# Patient Record
Sex: MALE | Race: WHITE | NOT HISPANIC OR LATINO | Employment: FULL TIME | URBAN - METROPOLITAN AREA
[De-identification: names, ages, dates, MRNs, and addresses within clinical notes are randomized per-mention and may not be internally consistent; named-entity substitution may affect disease eponyms.]

---

## 2020-03-13 ENCOUNTER — HOSPITAL ENCOUNTER (OUTPATIENT)
Dept: CARDIOLOGY | Facility: CLINIC | Age: 55
End: 2020-03-13
Attending: EMERGENCY MEDICINE
Payer: COMMERCIAL

## 2020-03-13 DIAGNOSIS — R07.9 CHEST PAIN: ICD-10-CM

## 2020-03-16 ENCOUNTER — HOSPITAL ENCOUNTER (OUTPATIENT)
Dept: CARDIOLOGY | Facility: CLINIC | Age: 55
End: 2020-03-16
Attending: EMERGENCY MEDICINE
Payer: COMMERCIAL

## 2020-03-16 ENCOUNTER — HOSPITAL ENCOUNTER (OUTPATIENT)
Dept: NUCLEAR MEDICINE | Facility: CLINIC | Age: 55
Setting detail: NUCLEAR MEDICINE
End: 2020-03-16
Attending: EMERGENCY MEDICINE
Payer: COMMERCIAL

## 2020-03-16 DIAGNOSIS — R07.9 CHEST PAIN: ICD-10-CM

## 2020-03-16 LAB
CV STRESS MAX HR HE: 96
RATE PRESSURE PRODUCT: NORMAL
STRESS ECHO BASELINE DIASTOLIC HE: 89
STRESS ECHO BASELINE HR: 67
STRESS ECHO BASELINE SYSTOLIC BP: 139
STRESS ECHO CALCULATED PERCENT HR: 57 %
STRESS ECHO LAST STRESS DIASTOLIC BP: 71
STRESS ECHO LAST STRESS SYSTOLIC BP: 138
STRESS ECHO TARGET HR: 167

## 2020-03-16 PROCEDURE — 93017 CV STRESS TEST TRACING ONLY: CPT

## 2020-03-16 PROCEDURE — 93018 CV STRESS TEST I&R ONLY: CPT | Performed by: INTERNAL MEDICINE

## 2020-03-16 PROCEDURE — 78452 HT MUSCLE IMAGE SPECT MULT: CPT | Mod: 26 | Performed by: INTERNAL MEDICINE

## 2020-03-16 PROCEDURE — 25000128 H RX IP 250 OP 636: Performed by: INTERNAL MEDICINE

## 2020-03-16 PROCEDURE — A9502 TC99M TETROFOSMIN: HCPCS

## 2020-03-16 PROCEDURE — 78452 HT MUSCLE IMAGE SPECT MULT: CPT

## 2020-03-16 PROCEDURE — 93016 CV STRESS TEST SUPVJ ONLY: CPT | Performed by: INTERNAL MEDICINE

## 2020-03-16 PROCEDURE — 34300033 ZZH RX 343

## 2020-03-16 RX ORDER — AMINOPHYLLINE 25 MG/ML
50-100 INJECTION, SOLUTION INTRAVENOUS
Status: DISCONTINUED | OUTPATIENT
Start: 2020-03-16 | End: 2020-03-17 | Stop reason: HOSPADM

## 2020-03-16 RX ORDER — REGADENOSON 0.08 MG/ML
0.4 INJECTION, SOLUTION INTRAVENOUS ONCE
Status: COMPLETED | OUTPATIENT
Start: 2020-03-16 | End: 2020-03-16

## 2020-03-16 RX ORDER — ALBUTEROL SULFATE 90 UG/1
2 AEROSOL, METERED RESPIRATORY (INHALATION) EVERY 5 MIN PRN
Status: DISCONTINUED | OUTPATIENT
Start: 2020-03-16 | End: 2020-03-17 | Stop reason: HOSPADM

## 2020-03-16 RX ORDER — ACYCLOVIR 200 MG/1
0-1 CAPSULE ORAL
Status: DISCONTINUED | OUTPATIENT
Start: 2020-03-16 | End: 2020-03-17 | Stop reason: HOSPADM

## 2020-03-16 RX ADMIN — TETROFOSMIN 10.9 MCI.: 1.38 INJECTION, POWDER, LYOPHILIZED, FOR SOLUTION INTRAVENOUS at 08:23

## 2020-03-16 RX ADMIN — TETROFOSMIN 36 MCI.: 1.38 INJECTION, POWDER, LYOPHILIZED, FOR SOLUTION INTRAVENOUS at 09:25

## 2020-03-16 RX ADMIN — REGADENOSON 0.4 MG: 0.08 INJECTION, SOLUTION INTRAVENOUS at 09:25

## 2020-03-16 NOTE — PROGRESS NOTES
Pt here for Lexiscan nuclear stress test.  Medication and side effects reviewed with patient. Lung sounds clear to auscultation bilaterally. Denied caffeine use. Patient tolerated Lexiscan dose with complaints of SOB and dizziness; both symptoms slowly resolved. VSS. Monitored post injection and then taken to the gold waiting room and instructed to wait there for nuclear medicine tech for follow up imaging.

## 2020-03-22 ENCOUNTER — HEALTH MAINTENANCE LETTER (OUTPATIENT)
Age: 55
End: 2020-03-22

## 2021-01-15 ENCOUNTER — HEALTH MAINTENANCE LETTER (OUTPATIENT)
Age: 56
End: 2021-01-15

## 2021-03-12 ENCOUNTER — IMMUNIZATION (OUTPATIENT)
Dept: NURSING | Facility: CLINIC | Age: 56
End: 2021-03-12
Payer: COMMERCIAL

## 2021-03-12 PROCEDURE — 0001A PR COVID VAC PFIZER DIL RECON 30 MCG/0.3 ML IM: CPT

## 2021-03-12 PROCEDURE — 91300 PR COVID VAC PFIZER DIL RECON 30 MCG/0.3 ML IM: CPT

## 2021-04-02 ENCOUNTER — IMMUNIZATION (OUTPATIENT)
Dept: NURSING | Facility: CLINIC | Age: 56
End: 2021-04-02
Attending: INTERNAL MEDICINE
Payer: COMMERCIAL

## 2021-04-02 PROCEDURE — 91300 PR COVID VAC PFIZER DIL RECON 30 MCG/0.3 ML IM: CPT

## 2021-04-02 PROCEDURE — 0002A PR COVID VAC PFIZER DIL RECON 30 MCG/0.3 ML IM: CPT

## 2021-05-15 ENCOUNTER — HEALTH MAINTENANCE LETTER (OUTPATIENT)
Age: 56
End: 2021-05-15

## 2021-09-04 ENCOUNTER — HEALTH MAINTENANCE LETTER (OUTPATIENT)
Age: 56
End: 2021-09-04

## 2022-06-11 ENCOUNTER — HEALTH MAINTENANCE LETTER (OUTPATIENT)
Age: 57
End: 2022-06-11

## 2022-10-16 ENCOUNTER — HEALTH MAINTENANCE LETTER (OUTPATIENT)
Age: 57
End: 2022-10-16

## 2023-06-05 ENCOUNTER — APPOINTMENT (OUTPATIENT)
Dept: LAB | Facility: CLINIC | Age: 58
End: 2023-06-05
Payer: COMMERCIAL

## 2023-06-05 ENCOUNTER — VIRTUAL VISIT (OUTPATIENT)
Dept: FAMILY MEDICINE | Facility: CLINIC | Age: 58
End: 2023-06-05
Payer: COMMERCIAL

## 2023-06-05 DIAGNOSIS — R09.81 NASAL CONGESTION: ICD-10-CM

## 2023-06-05 DIAGNOSIS — J02.9 ACUTE PHARYNGITIS, UNSPECIFIED ETIOLOGY: Primary | ICD-10-CM

## 2023-06-05 LAB
DEPRECATED S PYO AG THROAT QL EIA: NEGATIVE
GROUP A STREP BY PCR: NOT DETECTED

## 2023-06-05 PROCEDURE — 87651 STREP A DNA AMP PROBE: CPT | Mod: VID

## 2023-06-05 PROCEDURE — 99203 OFFICE O/P NEW LOW 30 MIN: CPT | Mod: VID

## 2023-06-05 RX ORDER — BUSPIRONE HYDROCHLORIDE 5 MG/1
TABLET ORAL
COMMUNITY
Start: 2023-06-05

## 2023-06-05 RX ORDER — GABAPENTIN 300 MG/1
CAPSULE ORAL
COMMUNITY
Start: 2023-03-11

## 2023-06-05 RX ORDER — NORTRIPTYLINE HYDROCHLORIDE 75 MG/1
CAPSULE ORAL
COMMUNITY
Start: 2023-05-09

## 2023-06-05 RX ORDER — NAPROXEN 500 MG/1
1 TABLET ORAL
COMMUNITY
Start: 2023-03-11

## 2023-06-05 ASSESSMENT — ENCOUNTER SYMPTOMS: SORE THROAT: 1

## 2023-06-05 NOTE — ASSESSMENT & PLAN NOTE
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  Will plan to follow-up on results.  In the context of a negative rapid strep and strep culture, symptoms do seem most consistent with an upper respiratory viral infection, likely with some postnasal drip contributing to his sore throat.  Would recommend supportive cares, including the use of over-the-counter's as they are helpful, humidification, increase fluids and rest.  Regarding his congestion, his nasal discharge is currently clear and symptoms are relatively mild.  Additionally, they have been present for less than a week.  We discussed monitoring these at for worsening or persistence over 7 to 10 days.  We will consider treatment for secondary bacterial sinusitis at that time.  Patient expressed understanding of and is comfortable with this plan.  All questions were answered.

## 2023-06-05 NOTE — PROGRESS NOTES
Harshil is a 57 year old who is being evaluated via a billable video visit.      How would you like to obtain your AVS? MyChart  If the video visit is dropped, the invitation should be resent by: Text to cell phone: [phone]  Will anyone else be joining your video visit? No    Assessment & Plan   Problem List Items Addressed This Visit        Respiratory    Acute pharyngitis, unspecified etiology - Primary     We will rule out strep with wife's occupation as a teacher.  Will plan to follow-up on results.  In the context of a negative rapid strep and strep culture, symptoms do seem most consistent with an upper respiratory viral infection, likely with some postnasal drip contributing to his sore throat.  Would recommend supportive cares, including the use of over-the-counter's as they are helpful, humidification, increase fluids and rest.  Regarding his congestion, his nasal discharge is currently clear and symptoms are relatively mild.  Additionally, they have been present for less than a week.  We discussed monitoring these at for worsening or persistence over 7 to 10 days.  We will consider treatment for secondary bacterial sinusitis at that time.  Patient expressed understanding of and is comfortable with this plan.  All questions were answered.         Relevant Orders    Streptococcus A Rapid Screen w/Reflex to PCR - Clinic Collect   Other Visit Diagnoses     Nasal congestion             Janey Skinner, FRANCISCO CNP  [facility]   Harshil is a 57 year old, presenting for the following health issues:  Pharyngitis (4 days) and Sinus Problem (congestion)        6/5/2023    10:37 AM   Additional Questions   Roomed by ac   Accompanied by self     -Patient reports that he does usually get a sinus infection a couple of times a year.   -Started with a scratchy throat about 4 days ago. Symptoms have slightly worsened  -Sinus pressure  -Mild rhinorrhea. Color is clear  -Throat is sore to  swallow over the last 3 days. Is red and inflamed but does not see white spots  -No fevers, eye pain, ear pain, wheezing, chest pain. Mild cough, more irritated. No productive. Has been fatigued.  -Advil does help. He has also used chloroseptic spray.   -Wife had similar symptoms that are improving. No known exposures to strep throat, but wife does work at a school.      Pharyngitis     Sinus Problem   Associated symptoms include sore throat.   History of Present Illness       Reason for visit:  Sinus infection/strep throat  Symptom onset:  3-7 days ago  Symptoms include:  Very sore throat.  Hurts to swallow.  stuffed up.  feeling tired  Symptom intensity:  Severe  Symptom progression:  Worsening  Had these symptoms before:  Yes  What makes it better:  Advil    He eats 2-3 servings of fruits and vegetables daily.He consumes 1 sweetened beverage(s) daily.He exercises with enough effort to increase his heart rate 20 to 29 minutes per day.  He exercises with enough effort to increase his heart rate 6 days per week.   He is taking medications regularly.     Review of Systems   HENT: Positive for sore throat.           Objective    Vitals - Patient Reported  Weight (Patient Reported): 89.8 kg (198 lb)  Temperature (Patient Reported): 98  F (36.7  C)        Physical Exam   GENERAL: Healthy, alert and no distress  EYES: Eyes grossly normal to inspection.  No discharge or erythema, or obvious scleral/conjunctival abnormalities.  RESP: No audible wheeze, cough, or visible cyanosis.  No visible retractions or increased work of breathing.    SKIN: Visible skin clear. No significant rash, abnormal pigmentation or lesions.  NEURO: Cranial nerves grossly intact.  Mentation and speech appropriate for age.  PSYCH: Mentation appears normal, affect normal/bright, judgement and insight intact, normal speech and appearance well-groomed.          Video-Visit Details    Type of service:  Video Visit     Originating Location (pt.  Location): Home    Distant Location (provider location):  On-site  Platform used for Video Visit: Lana

## 2023-06-05 NOTE — PATIENT INSTRUCTIONS
I will follow up with you on the strep test results  For congestion, I would recommend increasing fluids, using a humidifier, trying steam showers. Over the counter medications that can be helpful include Flonase and/or decongestants.   If symptoms worsen in any way or persist over 7-10 days, I would recommend reassessment for potential antibiotics   
fall

## 2023-06-17 ENCOUNTER — HEALTH MAINTENANCE LETTER (OUTPATIENT)
Age: 58
End: 2023-06-17

## 2024-08-10 ENCOUNTER — HEALTH MAINTENANCE LETTER (OUTPATIENT)
Age: 59
End: 2024-08-10

## 2024-11-18 ENCOUNTER — APPOINTMENT (OUTPATIENT)
Dept: GENERAL RADIOLOGY | Facility: CLINIC | Age: 59
DRG: 287 | End: 2024-11-18
Attending: EMERGENCY MEDICINE
Payer: COMMERCIAL

## 2024-11-18 ENCOUNTER — APPOINTMENT (OUTPATIENT)
Dept: ULTRASOUND IMAGING | Facility: CLINIC | Age: 59
DRG: 287 | End: 2024-11-18
Attending: EMERGENCY MEDICINE
Payer: COMMERCIAL

## 2024-11-18 ENCOUNTER — HOSPITAL ENCOUNTER (INPATIENT)
Facility: CLINIC | Age: 59
LOS: 3 days | Discharge: HOME | DRG: 287 | End: 2024-11-21
Attending: EMERGENCY MEDICINE | Admitting: STUDENT IN AN ORGANIZED HEALTH CARE EDUCATION/TRAINING PROGRAM
Payer: COMMERCIAL

## 2024-11-18 DIAGNOSIS — I50.9 ACUTE CONGESTIVE HEART FAILURE, UNSPECIFIED HEART FAILURE TYPE (H): ICD-10-CM

## 2024-11-18 DIAGNOSIS — I44.7 LBBB (LEFT BUNDLE BRANCH BLOCK): ICD-10-CM

## 2024-11-18 DIAGNOSIS — I42.8 NON-ISCHEMIC CARDIOMYOPATHY (H): Primary | ICD-10-CM

## 2024-11-18 LAB
ANION GAP SERPL CALCULATED.3IONS-SCNC: 13 MMOL/L (ref 7–15)
ATRIAL RATE - MUSE: 96 BPM
BASE EXCESS BLDV CALC-SCNC: 1.2 MMOL/L (ref -3–3)
BASOPHILS # BLD AUTO: 0.1 10E3/UL (ref 0–0.2)
BASOPHILS NFR BLD AUTO: 1 %
BUN SERPL-MCNC: 17 MG/DL (ref 6–20)
CALCIUM SERPL-MCNC: 9.3 MG/DL (ref 8.8–10.4)
CHLORIDE SERPL-SCNC: 101 MMOL/L (ref 98–107)
CREAT SERPL-MCNC: 1.05 MG/DL (ref 0.67–1.17)
D DIMER PPP FEU-MCNC: 0.45 UG/ML FEU (ref 0–0.5)
DIASTOLIC BLOOD PRESSURE - MUSE: NORMAL MMHG
EGFRCR SERPLBLD CKD-EPI 2021: 82 ML/MIN/1.73M2
EOSINOPHIL # BLD AUTO: 0 10E3/UL (ref 0–0.7)
EOSINOPHIL NFR BLD AUTO: 1 %
ERYTHROCYTE [DISTWIDTH] IN BLOOD BY AUTOMATED COUNT: 12.1 % (ref 10–15)
FLUAV RNA SPEC QL NAA+PROBE: NEGATIVE
FLUBV RNA RESP QL NAA+PROBE: NEGATIVE
GLUCOSE SERPL-MCNC: 91 MG/DL (ref 70–99)
HCO3 BLDV-SCNC: 25 MMOL/L (ref 21–28)
HCO3 SERPL-SCNC: 24 MMOL/L (ref 22–29)
HCT VFR BLD AUTO: 44.9 % (ref 40–53)
HGB BLD-MCNC: 15.4 G/DL (ref 13.3–17.7)
HOLD SPECIMEN: NORMAL
HOLD SPECIMEN: NORMAL
IMM GRANULOCYTES # BLD: 0 10E3/UL
IMM GRANULOCYTES NFR BLD: 0 %
INR PPP: 0.99 (ref 0.85–1.15)
INTERPRETATION ECG - MUSE: NORMAL
LYMPHOCYTES # BLD AUTO: 1.6 10E3/UL (ref 0.8–5.3)
LYMPHOCYTES NFR BLD AUTO: 18 %
MCH RBC QN AUTO: 30.2 PG (ref 26.5–33)
MCHC RBC AUTO-ENTMCNC: 34.3 G/DL (ref 31.5–36.5)
MCV RBC AUTO: 88 FL (ref 78–100)
MONOCYTES # BLD AUTO: 0.6 10E3/UL (ref 0–1.3)
MONOCYTES NFR BLD AUTO: 7 %
NEUTROPHILS # BLD AUTO: 6.3 10E3/UL (ref 1.6–8.3)
NEUTROPHILS NFR BLD AUTO: 73 %
NRBC # BLD AUTO: 0 10E3/UL
NRBC BLD AUTO-RTO: 0 /100
NT-PROBNP SERPL-MCNC: 4808 PG/ML (ref 0–900)
O2/TOTAL GAS SETTING VFR VENT: 0 %
OXYHGB MFR BLDV: 92 % (ref 70–75)
P AXIS - MUSE: 54 DEGREES
PCO2 BLDV: 37 MM HG (ref 40–50)
PH BLDV: 7.44 [PH] (ref 7.32–7.43)
PLATELET # BLD AUTO: 236 10E3/UL (ref 150–450)
PO2 BLDV: 63 MM HG (ref 25–47)
POTASSIUM SERPL-SCNC: 4.6 MMOL/L (ref 3.4–5.3)
PR INTERVAL - MUSE: 162 MS
QRS DURATION - MUSE: 180 MS
QT - MUSE: 426 MS
QTC - MUSE: 538 MS
R AXIS - MUSE: -20 DEGREES
RBC # BLD AUTO: 5.1 10E6/UL (ref 4.4–5.9)
RSV RNA SPEC NAA+PROBE: NEGATIVE
SAO2 % BLDV: 93.6 % (ref 70–75)
SARS-COV-2 RNA RESP QL NAA+PROBE: NEGATIVE
SODIUM SERPL-SCNC: 138 MMOL/L (ref 135–145)
SYSTOLIC BLOOD PRESSURE - MUSE: NORMAL MMHG
T AXIS - MUSE: 135 DEGREES
TROPONIN T SERPL HS-MCNC: 21 NG/L
TROPONIN T SERPL HS-MCNC: 21 NG/L
TSH SERPL DL<=0.005 MIU/L-ACNC: 4.12 UIU/ML (ref 0.3–4.2)
VENTRICULAR RATE- MUSE: 96 BPM
WBC # BLD AUTO: 8.6 10E3/UL (ref 4–11)

## 2024-11-18 PROCEDURE — 93970 EXTREMITY STUDY: CPT

## 2024-11-18 PROCEDURE — 99222 1ST HOSP IP/OBS MODERATE 55: CPT | Performed by: PHYSICIAN ASSISTANT

## 2024-11-18 PROCEDURE — 85610 PROTHROMBIN TIME: CPT | Performed by: EMERGENCY MEDICINE

## 2024-11-18 PROCEDURE — 71046 X-RAY EXAM CHEST 2 VIEWS: CPT

## 2024-11-18 PROCEDURE — 250N000011 HC RX IP 250 OP 636: Performed by: EMERGENCY MEDICINE

## 2024-11-18 PROCEDURE — 82805 BLOOD GASES W/O2 SATURATION: CPT | Performed by: EMERGENCY MEDICINE

## 2024-11-18 PROCEDURE — 84484 ASSAY OF TROPONIN QUANT: CPT | Performed by: EMERGENCY MEDICINE

## 2024-11-18 PROCEDURE — 99285 EMERGENCY DEPT VISIT HI MDM: CPT | Mod: 25

## 2024-11-18 PROCEDURE — 250N000013 HC RX MED GY IP 250 OP 250 PS 637: Performed by: PHYSICIAN ASSISTANT

## 2024-11-18 PROCEDURE — 87040 BLOOD CULTURE FOR BACTERIA: CPT | Performed by: EMERGENCY MEDICINE

## 2024-11-18 PROCEDURE — 36415 COLL VENOUS BLD VENIPUNCTURE: CPT | Performed by: EMERGENCY MEDICINE

## 2024-11-18 PROCEDURE — 96375 TX/PRO/DX INJ NEW DRUG ADDON: CPT

## 2024-11-18 PROCEDURE — 85004 AUTOMATED DIFF WBC COUNT: CPT | Performed by: EMERGENCY MEDICINE

## 2024-11-18 PROCEDURE — 85041 AUTOMATED RBC COUNT: CPT | Performed by: EMERGENCY MEDICINE

## 2024-11-18 PROCEDURE — 120N000001 HC R&B MED SURG/OB

## 2024-11-18 PROCEDURE — 85379 FIBRIN DEGRADATION QUANT: CPT | Performed by: EMERGENCY MEDICINE

## 2024-11-18 PROCEDURE — 87637 SARSCOV2&INF A&B&RSV AMP PRB: CPT | Performed by: EMERGENCY MEDICINE

## 2024-11-18 PROCEDURE — 94640 AIRWAY INHALATION TREATMENT: CPT

## 2024-11-18 PROCEDURE — 84295 ASSAY OF SERUM SODIUM: CPT | Performed by: EMERGENCY MEDICINE

## 2024-11-18 PROCEDURE — 84443 ASSAY THYROID STIM HORMONE: CPT | Performed by: EMERGENCY MEDICINE

## 2024-11-18 PROCEDURE — 80048 BASIC METABOLIC PNL TOTAL CA: CPT | Performed by: EMERGENCY MEDICINE

## 2024-11-18 PROCEDURE — 93005 ELECTROCARDIOGRAM TRACING: CPT

## 2024-11-18 PROCEDURE — 96374 THER/PROPH/DIAG INJ IV PUSH: CPT

## 2024-11-18 PROCEDURE — 250N000009 HC RX 250: Performed by: EMERGENCY MEDICINE

## 2024-11-18 PROCEDURE — 83036 HEMOGLOBIN GLYCOSYLATED A1C: CPT | Performed by: INTERNAL MEDICINE

## 2024-11-18 PROCEDURE — 85018 HEMOGLOBIN: CPT | Performed by: EMERGENCY MEDICINE

## 2024-11-18 PROCEDURE — 83880 ASSAY OF NATRIURETIC PEPTIDE: CPT | Performed by: EMERGENCY MEDICINE

## 2024-11-18 RX ORDER — ONDANSETRON 4 MG/1
4 TABLET, ORALLY DISINTEGRATING ORAL EVERY 6 HOURS PRN
Status: DISCONTINUED | OUTPATIENT
Start: 2024-11-18 | End: 2024-11-21 | Stop reason: HOSPADM

## 2024-11-18 RX ORDER — LEVETIRACETAM 500 MG/1
500 TABLET ORAL EVERY MORNING
Status: DISCONTINUED | OUTPATIENT
Start: 2024-11-19 | End: 2024-11-21 | Stop reason: HOSPADM

## 2024-11-18 RX ORDER — AMOXICILLIN 250 MG
2 CAPSULE ORAL 2 TIMES DAILY PRN
Status: DISCONTINUED | OUTPATIENT
Start: 2024-11-18 | End: 2024-11-21 | Stop reason: HOSPADM

## 2024-11-18 RX ORDER — BUSPIRONE HYDROCHLORIDE 10 MG/1
10 TABLET ORAL AT BEDTIME
Status: DISCONTINUED | OUTPATIENT
Start: 2024-11-18 | End: 2024-11-21 | Stop reason: HOSPADM

## 2024-11-18 RX ORDER — BUSPIRONE HYDROCHLORIDE 5 MG/1
10 TABLET ORAL AT BEDTIME
COMMUNITY

## 2024-11-18 RX ORDER — FUROSEMIDE 10 MG/ML
60 INJECTION INTRAMUSCULAR; INTRAVENOUS ONCE
Status: COMPLETED | OUTPATIENT
Start: 2024-11-18 | End: 2024-11-18

## 2024-11-18 RX ORDER — LIDOCAINE 40 MG/G
CREAM TOPICAL
Status: DISCONTINUED | OUTPATIENT
Start: 2024-11-18 | End: 2024-11-21 | Stop reason: HOSPADM

## 2024-11-18 RX ORDER — CALCIUM CARBONATE 500 MG/1
1000 TABLET, CHEWABLE ORAL 4 TIMES DAILY PRN
Status: DISCONTINUED | OUTPATIENT
Start: 2024-11-18 | End: 2024-11-21 | Stop reason: HOSPADM

## 2024-11-18 RX ORDER — AMOXICILLIN 250 MG
1 CAPSULE ORAL 2 TIMES DAILY PRN
Status: DISCONTINUED | OUTPATIENT
Start: 2024-11-18 | End: 2024-11-21 | Stop reason: HOSPADM

## 2024-11-18 RX ORDER — BUSPIRONE HYDROCHLORIDE 5 MG/1
5 TABLET ORAL EVERY MORNING
Status: DISCONTINUED | OUTPATIENT
Start: 2024-11-19 | End: 2024-11-21 | Stop reason: HOSPADM

## 2024-11-18 RX ORDER — METHYLPREDNISOLONE SODIUM SUCCINATE 125 MG/2ML
125 INJECTION INTRAMUSCULAR; INTRAVENOUS ONCE
Status: COMPLETED | OUTPATIENT
Start: 2024-11-18 | End: 2024-11-18

## 2024-11-18 RX ORDER — ONDANSETRON 2 MG/ML
4 INJECTION INTRAMUSCULAR; INTRAVENOUS EVERY 6 HOURS PRN
Status: DISCONTINUED | OUTPATIENT
Start: 2024-11-18 | End: 2024-11-21 | Stop reason: HOSPADM

## 2024-11-18 RX ORDER — LEVETIRACETAM 500 MG/1
1000 TABLET ORAL AT BEDTIME
COMMUNITY

## 2024-11-18 RX ORDER — IPRATROPIUM BROMIDE AND ALBUTEROL SULFATE 2.5; .5 MG/3ML; MG/3ML
3 SOLUTION RESPIRATORY (INHALATION) ONCE
Status: COMPLETED | OUTPATIENT
Start: 2024-11-18 | End: 2024-11-18

## 2024-11-18 RX ORDER — LEVETIRACETAM 500 MG/1
1000 TABLET ORAL AT BEDTIME
Status: DISCONTINUED | OUTPATIENT
Start: 2024-11-18 | End: 2024-11-21 | Stop reason: HOSPADM

## 2024-11-18 RX ADMIN — LEVETIRACETAM 1000 MG: 500 TABLET, FILM COATED ORAL at 21:52

## 2024-11-18 RX ADMIN — METHYLPREDNISOLONE SODIUM SUCCINATE 125 MG: 125 INJECTION, POWDER, FOR SOLUTION INTRAMUSCULAR; INTRAVENOUS at 16:12

## 2024-11-18 RX ADMIN — BUSPIRONE HYDROCHLORIDE 10 MG: 10 TABLET ORAL at 21:55

## 2024-11-18 RX ADMIN — IPRATROPIUM BROMIDE AND ALBUTEROL SULFATE 3 ML: .5; 3 SOLUTION RESPIRATORY (INHALATION) at 16:13

## 2024-11-18 RX ADMIN — NORTRIPTYLINE HYDROCHLORIDE 75 MG: 25 CAPSULE ORAL at 22:56

## 2024-11-18 RX ADMIN — FUROSEMIDE 60 MG: 10 INJECTION, SOLUTION INTRAMUSCULAR; INTRAVENOUS at 17:32

## 2024-11-18 ASSESSMENT — ACTIVITIES OF DAILY LIVING (ADL)
ADLS_ACUITY_SCORE: 0

## 2024-11-18 NOTE — ED TRIAGE NOTES
Pt arrives with complaint of shortness of breath, chills, dyspnea on exertion. Symptoms began about a week and a half ago, progressively worsening. Cough that is worse at night. Sent from [facility]. New LBBB on EKG.

## 2024-11-18 NOTE — ED PROVIDER NOTES
"  Emergency Department Note      History of Present Illness     Chief Complaint   Shortness of Breath      HPI   Clement Robert is a 59 year old male with history of epilepsy and anxiety who presents to the ED for evaluation of shortness of breath. Patient presents with 2 weeks of worsening shortness of breath, dyspnea on exertion, and palpitations. Last night, Harshil states he was only able to get 1.5 hours of sleep as his heart rate was higher than normal and he could not calm down/relax. He notes heartburn that has been ongoing for months that acutely worsened in the past 2 days with a globus sensation in his throat and \"burning\" in his upper chest. This usually resolves in an hour with OTC medications and TUMS. The feeling is triggered with eating. Endorses minor dry cough and cold sweats last night. History of left bundle branch block during COVID-19. He had one episode of A-Fib with subsequent cardioversion following head trauma. Denies nausea, diaphoresis, shortness of breath with the GERD flare up, abdominal pain, or recent long travel. Patient reports variable heart rate readings on his watch and discomfort when lying flat. He is concerned as he \"cannot catch his breath.\"        Independent Historian   None    Review of External Notes   I reviewed [facility] from earlier today. Dyspnea and tachycardia starting one week ago.    Past Medical History     Medical History and Problem List   Anxiety  Epilepsy  MDD  Colon polyp  Migraine  Atrial fibrillation     Medications   Buspirone  Gabapentin  Levetriacetam  Naproxen   Nortriptyline    Surgical History   Elbow surgery  [place] teeth extraction     Physical Exam     Patient Vitals for the past 24 hrs:   BP Temp Temp src Pulse Resp SpO2 Height Weight   11/18/24 1934 123/85 98  F (36.7  C) Oral 96 22 98 % 1.778 m (5' 10\") 89.3 kg (196 lb 14.4 oz)   11/18/24 1900 (!) 132/114 -- -- 101 -- 94 % -- --   11/18/24 1745 (!) 125/92 -- -- 96 20 97 % " "-- --   11/18/24 1623 121/86 -- -- 89 -- 98 % -- --   11/18/24 1429 (!) 118/93 98.3  F (36.8  C) -- 95 24 99 % -- --   11/18/24 1426 -- -- -- -- -- -- 1.778 m (5' 10\") 91.9 kg (202 lb 9.6 oz)     Physical Exam  General: The patient is alert, in no respiratory distress.    HENT: Mucous membranes moist.    Cardiovascular: Regular rate and rhythm. Good pulses in all four extremities. Normal capillary refill and skin turgor.     Respiratory: Lungs are clear. No nasal flaring. No retractions. No wheezing, no crackles.    Gastrointestinal: Abdomen soft. No guarding, no rebound. No palpable hernias.     Musculoskeletal: No gross deformity.     Skin: No rashes or petechiae.     Neurologic: The patient is alert and oriented x3. GCS 15. No testable cranial nerve deficit. Follows commands with clear and appropriate speech. Gives appropriate answers. Good strength in all extremities. No gross neurologic deficit. Gross sensation intact. Pupils are round and reactive. No meningismus.     Lymphatic: No cervical adenopathy. No lower extremity swelling.    Psychiatric: The patient is non-tearful.     Diagnostics     Lab Results   Labs Ordered and Resulted from Time of ED Arrival to Time of ED Departure   BLOOD GAS VENOUS - Abnormal       Result Value    pH Venous 7.44 (*)     pCO2 Venous 37 (*)     pO2 Venous 63 (*)     Bicarbonate Venous 25      Base Excess/Deficit Venous 1.2      FIO2 0      Oxyhemoglobin Venous 92 (*)     O2 Sat, Venous 93.6 (*)    NT PROBNP INPATIENT - Abnormal    N terminal Pro BNP Inpatient 4,808 (*)    BASIC METABOLIC PANEL - Normal    Sodium 138      Potassium 4.6      Chloride 101      Carbon Dioxide (CO2) 24      Anion Gap 13      Urea Nitrogen 17.0      Creatinine 1.05      GFR Estimate 82      Calcium 9.3      Glucose 91     TROPONIN T, HIGH SENSITIVITY - Normal    Troponin T, High Sensitivity 21     INFLUENZA A/B, RSV AND SARS-COV2 PCR - Normal    Influenza A PCR Negative      Influenza B PCR Negative   "    RSV PCR Negative      SARS CoV2 PCR Negative     D DIMER QUANTITATIVE - Normal    D-Dimer Quantitative 0.45     INR - Normal    INR 0.99     TSH WITH FREE T4 REFLEX - Normal    TSH 4.12     TROPONIN T, HIGH SENSITIVITY - Normal    Troponin T, High Sensitivity 21     CBC WITH PLATELETS AND DIFFERENTIAL    WBC Count 8.6      RBC Count 5.10      Hemoglobin 15.4      Hematocrit 44.9      MCV 88      MCH 30.2      MCHC 34.3      RDW 12.1      Platelet Count 236      % Neutrophils 73      % Lymphocytes 18      % Monocytes 7      % Eosinophils 1      % Basophils 1      % Immature Granulocytes 0      NRBCs per 100 WBC 0      Absolute Neutrophils 6.3      Absolute Lymphocytes 1.6      Absolute Monocytes 0.6      Absolute Eosinophils 0.0      Absolute Basophils 0.1      Absolute Immature Granulocytes 0.0      Absolute NRBCs 0.0     BLOOD CULTURE       Imaging   US Lower Extremity Venous Duplex Bilateral   Final Result   IMPRESSION:    1.  RIGHT LEG: Negative for deep vein thrombosis.   2.  LEFT LEG: Negative for deep vein thrombosis.      EDELMIRA CASON MD            SYSTEM ID:  [id number]      XR Chest 2 Views   Final Result   IMPRESSION: Cardiac enlargement and bilateral pulmonary venous   congestion are new since the previous exam. Lungs clear. Small right   pleural effusion. No pneumothorax. Aortic calcification.      DALI FOY MD            SYSTEM ID:  [id number]      Echocardiogram Complete    (Results Pending)       EKG [facility]   ECG taken at 1341, ECG read at 1341  Sinus rhythm with occasional premature ventricular complexes and fusion complexes   Possible Left atrial enlargement  Left bundle branch block   Abnormal ECG  Rate 106 bpm. CT interval 154 ms. QRS duration 190 ms. QT/QTc 434/539 ms. P-R-T axes 47 -13 139.     EKG   ECG results from 11/18/24   EKG 12-lead, tracing only     Value    Systolic Blood Pressure     Diastolic Blood Pressure     Ventricular Rate 96    Atrial Rate 96    CT Interval 162    QRS  Duration 180        QTc 538    P Axis 54    R AXIS -20    T Axis 135    Interpretation ECG      Sinus rhythm  Possible Left atrial enlargement  Left bundle branch block  Abnormal ECG  Read by Dr. Sanchez at 1421.           Independent Interpretation   I viewed the patient's chest x-ray and do see signs of cardiomegaly    ED Course      Medications Administered   Medications   lidocaine 1 % 0.1-1 mL (has no administration in time range)   lidocaine (LMX4) cream (has no administration in time range)   sodium chloride (PF) 0.9% PF flush 3 mL (has no administration in time range)   sodium chloride (PF) 0.9% PF flush 3 mL (has no administration in time range)   senna-docusate (SENOKOT-S/PERICOLACE) 8.6-50 MG per tablet 1 tablet (has no administration in time range)     Or   senna-docusate (SENOKOT-S/PERICOLACE) 8.6-50 MG per tablet 2 tablet (has no administration in time range)   calcium carbonate (TUMS) chewable tablet 1,000 mg (has no administration in time range)   ondansetron (ZOFRAN ODT) ODT tab 4 mg (has no administration in time range)     Or   ondansetron (ZOFRAN) injection 4 mg (has no administration in time range)   busPIRone (BUSPAR) tablet 5 mg (has no administration in time range)   busPIRone (BUSPAR) tablet 10 mg (has no administration in time range)   levETIRAcetam (KEPPRA) tablet 1,000 mg (has no administration in time range)   levETIRAcetam (KEPPRA) tablet 500 mg (has no administration in time range)   nortriptyline (PAMELOR) capsule 75 mg (has no administration in time range)   ipratropium - albuterol 0.5 mg/2.5 mg/3 mL (DUONEB) neb solution 3 mL (3 mLs Nebulization $Given 11/18/24 1613)   methylPREDNISolone Na Suc (solu-MEDROL) injection 125 mg (125 mg Intravenous $Given 11/18/24 1612)   furosemide (LASIX) injection 60 mg (60 mg Intravenous $Given 11/18/24 1732)       Procedures   Procedures     Discussion of Management   1810 discussed with Dr Mcfarlane of cardiology. Agrees with Echo.   Discussed  the case with the admitting hospitalist    ED Course   ED Course as of 11/18/24 2051   Mon Nov 18, 2024   1500 I obtained history and examined the patient as noted above.        Additional Documentation  None    Medical Decision Making / Diagnosis         MDM   Clement Robert is a 59 year old male was sent in from urgent care due to an abnormal EKG which showed left bundle branch block however by my review as well as review of the chart and taking the patient's history he has had a left bundle branch block before.  He was tachypneic here had dyspnea exertion he does not appear to be having lower significant lower extremity edema.  I therefore did take a broad differential considered PE DVT infection sepsis fevers hypoxia ACS bronchospasm CHF anemia amongst many other conditions.  Pneumonia was considered as well.  The patient's BNP was elevated his chest x-ray showed signs of cardiomegaly I therefore ordered an echo discussed the case with cardiology he has old left bundle branch block we do not feel he requires an emergent cath diuresis was started and the patient was admitted to the hospital.    Critical care time is 40 minutes and excluding procedures.    Disposition   The patient was admitted to the hospital.     Diagnosis     ICD-10-CM    1. Acute congestive heart failure, unspecified heart failure type (H)  I50.9       2. LBBB (left bundle branch block)  I44.7            Discharge Medications   Current Discharge Medication List            Scribe Disclosure:  I, Jazmine Rasheed, am serving as a scribe at 2:59 PM on 11/18/2024 to document services personally performed by Faheem Sanchez MD based on my observations and the provider's statements to me.        Faheem Sanchez MD  11/18/24 2054

## 2024-11-19 ENCOUNTER — APPOINTMENT (OUTPATIENT)
Dept: CARDIOLOGY | Facility: CLINIC | Age: 59
DRG: 287 | End: 2024-11-19
Attending: PHYSICIAN ASSISTANT
Payer: COMMERCIAL

## 2024-11-19 LAB
ANION GAP SERPL CALCULATED.3IONS-SCNC: 15 MMOL/L (ref 7–15)
BI-PLANE LVEF ECHO: NORMAL
BUN SERPL-MCNC: 18.8 MG/DL (ref 6–20)
CALCIUM SERPL-MCNC: 9.1 MG/DL (ref 8.8–10.4)
CHLORIDE SERPL-SCNC: 103 MMOL/L (ref 98–107)
CHOLEST SERPL-MCNC: 219 MG/DL
CREAT SERPL-MCNC: 0.83 MG/DL (ref 0.67–1.17)
CRP SERPL-MCNC: 11.6 MG/L
EGFRCR SERPLBLD CKD-EPI 2021: >90 ML/MIN/1.73M2
ERYTHROCYTE [SEDIMENTATION RATE] IN BLOOD BY WESTERGREN METHOD: 4 MM/HR (ref 0–20)
EST. AVERAGE GLUCOSE BLD GHB EST-MCNC: 100 MG/DL
GLUCOSE SERPL-MCNC: 127 MG/DL (ref 70–99)
HBA1C MFR BLD: 5.1 %
HCO3 SERPL-SCNC: 21 MMOL/L (ref 22–29)
HDLC SERPL-MCNC: 65 MG/DL
LDLC SERPL CALC-MCNC: 145 MG/DL
NONHDLC SERPL-MCNC: 154 MG/DL
POTASSIUM SERPL-SCNC: 4.2 MMOL/L (ref 3.4–5.3)
SODIUM SERPL-SCNC: 139 MMOL/L (ref 135–145)
TRIGL SERPL-MCNC: 47 MG/DL

## 2024-11-19 PROCEDURE — 99232 SBSQ HOSP IP/OBS MODERATE 35: CPT | Performed by: INTERNAL MEDICINE

## 2024-11-19 PROCEDURE — 82465 ASSAY BLD/SERUM CHOLESTEROL: CPT | Performed by: PHYSICIAN ASSISTANT

## 2024-11-19 PROCEDURE — 93306 TTE W/DOPPLER COMPLETE: CPT | Mod: 26 | Performed by: INTERNAL MEDICINE

## 2024-11-19 PROCEDURE — 85652 RBC SED RATE AUTOMATED: CPT | Performed by: INTERNAL MEDICINE

## 2024-11-19 PROCEDURE — 82374 ASSAY BLOOD CARBON DIOXIDE: CPT | Performed by: PHYSICIAN ASSISTANT

## 2024-11-19 PROCEDURE — 999N000208 ECHOCARDIOGRAM COMPLETE

## 2024-11-19 PROCEDURE — 36415 COLL VENOUS BLD VENIPUNCTURE: CPT | Performed by: PHYSICIAN ASSISTANT

## 2024-11-19 PROCEDURE — 120N000001 HC R&B MED SURG/OB

## 2024-11-19 PROCEDURE — 83718 ASSAY OF LIPOPROTEIN: CPT | Performed by: PHYSICIAN ASSISTANT

## 2024-11-19 PROCEDURE — 80061 LIPID PANEL: CPT | Performed by: PHYSICIAN ASSISTANT

## 2024-11-19 PROCEDURE — 250N000011 HC RX IP 250 OP 636: Performed by: INTERNAL MEDICINE

## 2024-11-19 PROCEDURE — 99255 IP/OBS CONSLTJ NEW/EST HI 80: CPT | Mod: 25 | Performed by: INTERNAL MEDICINE

## 2024-11-19 PROCEDURE — 36415 COLL VENOUS BLD VENIPUNCTURE: CPT | Performed by: INTERNAL MEDICINE

## 2024-11-19 PROCEDURE — 80048 BASIC METABOLIC PNL TOTAL CA: CPT | Performed by: PHYSICIAN ASSISTANT

## 2024-11-19 PROCEDURE — 250N000013 HC RX MED GY IP 250 OP 250 PS 637: Performed by: INTERNAL MEDICINE

## 2024-11-19 PROCEDURE — 255N000002 HC RX 255 OP 636: Performed by: PHYSICIAN ASSISTANT

## 2024-11-19 PROCEDURE — 250N000013 HC RX MED GY IP 250 OP 250 PS 637: Performed by: PHYSICIAN ASSISTANT

## 2024-11-19 PROCEDURE — C8929 TTE W OR WO FOL WCON,DOPPLER: HCPCS

## 2024-11-19 PROCEDURE — 86140 C-REACTIVE PROTEIN: CPT | Performed by: INTERNAL MEDICINE

## 2024-11-19 RX ORDER — LORAZEPAM 0.5 MG/1
.5-1 TABLET ORAL EVERY 4 HOURS PRN
Status: DISCONTINUED | OUTPATIENT
Start: 2024-11-19 | End: 2024-11-19

## 2024-11-19 RX ORDER — CARVEDILOL 3.12 MG/1
3.12 TABLET ORAL 2 TIMES DAILY WITH MEALS
Status: DISCONTINUED | OUTPATIENT
Start: 2024-11-19 | End: 2024-11-19

## 2024-11-19 RX ORDER — ROSUVASTATIN CALCIUM 20 MG/1
20 TABLET, COATED ORAL AT BEDTIME
Status: DISCONTINUED | OUTPATIENT
Start: 2024-11-19 | End: 2024-11-21 | Stop reason: HOSPADM

## 2024-11-19 RX ORDER — LORAZEPAM 0.5 MG/1
.5-1 TABLET ORAL EVERY 4 HOURS PRN
Status: DISCONTINUED | OUTPATIENT
Start: 2024-11-19 | End: 2024-11-21 | Stop reason: HOSPADM

## 2024-11-19 RX ORDER — ASPIRIN 81 MG/1
81 TABLET ORAL DAILY
Status: DISCONTINUED | OUTPATIENT
Start: 2024-11-19 | End: 2024-11-20

## 2024-11-19 RX ORDER — METOPROLOL SUCCINATE 25 MG/1
25 TABLET, EXTENDED RELEASE ORAL DAILY
Status: DISCONTINUED | OUTPATIENT
Start: 2024-11-19 | End: 2024-11-21 | Stop reason: HOSPADM

## 2024-11-19 RX ORDER — FUROSEMIDE 10 MG/ML
40 INJECTION INTRAMUSCULAR; INTRAVENOUS EVERY 12 HOURS
Status: DISCONTINUED | OUTPATIENT
Start: 2024-11-19 | End: 2024-11-20

## 2024-11-19 RX ADMIN — METOPROLOL SUCCINATE 25 MG: 25 TABLET, EXTENDED RELEASE ORAL at 14:32

## 2024-11-19 RX ADMIN — LEVETIRACETAM 1000 MG: 500 TABLET, FILM COATED ORAL at 21:56

## 2024-11-19 RX ADMIN — FUROSEMIDE 40 MG: 10 INJECTION, SOLUTION INTRAMUSCULAR; INTRAVENOUS at 10:32

## 2024-11-19 RX ADMIN — BUSPIRONE HYDROCHLORIDE 5 MG: 5 TABLET ORAL at 10:31

## 2024-11-19 RX ADMIN — ROSUVASTATIN CALCIUM 20 MG: 20 TABLET, FILM COATED ORAL at 21:56

## 2024-11-19 RX ADMIN — BUSPIRONE HYDROCHLORIDE 10 MG: 10 TABLET ORAL at 21:56

## 2024-11-19 RX ADMIN — SACUBITRIL AND VALSARTAN 1 TABLET: 24; 26 TABLET, FILM COATED ORAL at 21:56

## 2024-11-19 RX ADMIN — FUROSEMIDE 40 MG: 10 INJECTION, SOLUTION INTRAMUSCULAR; INTRAVENOUS at 21:56

## 2024-11-19 RX ADMIN — HUMAN ALBUMIN MICROSPHERES AND PERFLUTREN 3 ML: 10; .22 INJECTION, SOLUTION INTRAVENOUS at 10:03

## 2024-11-19 RX ADMIN — ASPIRIN 81 MG: 81 TABLET, COATED ORAL at 13:42

## 2024-11-19 RX ADMIN — LEVETIRACETAM 500 MG: 500 TABLET, FILM COATED ORAL at 10:31

## 2024-11-19 RX ADMIN — NORTRIPTYLINE HYDROCHLORIDE 75 MG: 25 CAPSULE ORAL at 21:56

## 2024-11-19 NOTE — PHARMACY-ADMISSION MEDICATION HISTORY
Pharmacy Intern Admission Medication History    Admission medication history is complete. The information provided in this note is only as accurate as the sources available at the time of the update.    Information Source(s): Patient and CareEverywhere/SureScripts via in-person    Pertinent Information: none    Changes made to PTA medication list:  Added: None  Deleted: Gabapentin, Sertraline, Tamiflu   Changed:   Directions added for both buspar and keppra   Naproxen BID-> BID PRN     Allergies reviewed with patient and updates made in EHR: yes    Medication History Completed By: Jody Blank RPH 11/18/2024 6:49 PM    PTA Med List   Medication Sig Last Dose/Taking    busPIRone (BUSPAR) 5 MG tablet Take 10 mg by mouth at bedtime. 11/17/2024    busPIRone (BUSPAR) 5 MG tablet Take 5 mg by mouth every morning. 11/18/2024 Morning    levETIRAcetam (KEPPRA) 500 MG tablet Take 1,000 mg by mouth at bedtime. 11/17/2024    levETIRAcetam (KEPPRA) 500 MG tablet Take 500 mg by mouth every morning. 11/18/2024 Morning    naproxen (NAPROSYN) 500 MG tablet Take 1 tablet by mouth 2 times daily as needed for headaches. Past Week    nortriptyline (PAMELOR) 75 MG capsule TAKE 1 CAPSULE BY MOUTH AT BEDTIME* 11/17/2024 Bedtime

## 2024-11-19 NOTE — H&P
[facility]    Hospitalist History and Physical    Name: Clement Robert    MRN: [id number]  YOB: 1965    Age: 59 year old  Date of Admission:  11/18/2024  Date of Service (when I saw the patient): 11/18/24    Assessment & Plan   Clement Robert is a 59 year old male with PMH significant for A-fib s/p cardioversion not on anticoagulation, anxiety, migraine headaches, and seizure disorder who presents to the ED per recommendation from  on 11/18/2024 for evaluation of shortness of breath.    ED workup reveals: BP mildly elevated otherwise VSS, BMP unremarkable, CBC WNL, troponin x2 of  21, D-dimer WNL, INR WNL, proBNP of 4808, TSH WNL, COVID/flu/RSV negative, VBG shows pH of 7.44, pCO2 of 37, pO2 of 63, and HCO3 of 25, blood culture obtained and pending, EKG shows rate of 96 bpm in sinus rhythm with possible left atrial enlargement, LBBB, chest x-ray shows cardiac enlargement and bilateral pulmonary venous congestion are new since previous exam, lungs clear, small right pleural effusion, no pneumothorax, and bilateral lower extremity ultrasound negative for DVT.     #Exertional dyspnea  #New onset CHF  A couple weeks worth of difficulty breathing that has become progressively worse over the last 3-4 days with CORREA while walking up a flight of stairs over the weekend PTA and increased fatigue with mulching leaves on 11/17, both of which are unusual for the patient. Symptoms seem to improve with rest. Notes orthopnea the last few nights with insomnia the evening prior to coming into the ED. He notes recent mild lightheadedness. No associated chest pain, palpitations. No prior tobacco abuse, CAD, or heart failure. Family history of CAD with father passing from MI at age of 44. Known to have LBBB on EKG when having outpatient stress test performed in 2020 with Lexiscan being negative for inducible myocardial ischemia or infarction. Not hypoxic. Troponin x2 WNL. EKG shows rate of 96  "bpm in SR with possible left atrial enlargement, LBBB, but no acute ST changes. CXR shows cardiac enlargement and bilateral pulmonary venous congestion. ProBNP elevated at 4808. Does not appear overtly hypervolemic on exam.   -monitor on telemetry  -obtain echocardiogram  -received IV Lasix 60 mg in ED, hold off on further diuresis and monitor response  -strict I&Os  -daily weights  -check fasting lipid panel  -cardiology consult pending echocardiogram findings    #H/o A-fib s/p cardioversion  Hit by hockey marco in around 2011 where he was found to be in A-fib when seen in the ED and cardioverted. No recurrent episodes, not on rate controlling medication, and not anticoagulated. Currently in SR.    #Migraine headaches  -continue PTA Nortriptyline     #Seizure disorder  No recent seizures  -continue PTA Keppra    #ROYCE  -continue PTA Buspar     Clinically Significant Risk Factors Present on Admission                             # Overweight: Estimated body mass index is 29.07 kg/m  as calculated from the following:    Height as of this encounter: 1.778 m (5' 10\").    Weight as of this encounter: 91.9 kg (202 lb 9.6 oz).              COVID: tested negative on 11/18/2024  DVT Prophylaxis: Pneumatic Compression Devices  Code Status: Full Code, discussed with patient   Disposition: Expected stay >2 midnights, will admit to inpatient    Primary Care Physician   [facility]    Chief Complaint   Shortness of breath     History obtained from discussion with ED provider, Dr. Sanchez, chart review, and interview with patient.     History of Present Illness   Clement Robert is a 59 year old male who presents with shortness of breath.  Patient reports over the last few weeks he has had difficulty breathing intermittently.  He reports that last night he was up all night due to feeling that his heart was faster than usual and noticed it to be 80-90 on his Fitbit.  Denies associated palpitations.  He states yesterday " he had dyspnea on exertion going up a flight of stairs.  Over the weekend he was increasingly fatigued after just mulching leaves.  He reports a cough at night that has been nonproductive with his wife recently being sick and thought he may have caught something from her.  He reports a recent COVID-vaccine a few weeks ago and uncertain if that contributed to his symptoms.  He notes mild lightheadedness of recent but denies associated chest pain or dizziness.  He has had increased acid reflux with eating for which has been taking Nexium at bedtime without significant change.  He endorses orthopnea over the last couple of days but denies lower extremity swelling, weight gain, or paroxysmal nocturnal dyspnea.  He states he typically walks his dogs about 20 minutes a day but has not walked them since last Thursday based on schedule as well as just being more fatigued than normal. He does report a history of a known left bundle branch block that he found out about in 2020 when he went to have a stress test performed, prior to this he was unaware of this.  He states that he has a family history of heart disease including his father passing away from an MI at the age of 44.    Past Medical History    Past Medical History:   Diagnosis Date    Anxiety     Seizures (H)      Past Surgical History   Surgical history reviewed with patient and noncontributory.     Prior to Admission Medications   Prior to Admission Medications   Prescriptions Last Dose Informant Patient Reported? Taking?   TAMIFLU 75 MG capsule   Yes No   Patient not taking: Reported on 6/5/2023   busPIRone (BUSPAR) 5 MG tablet   Yes No   gabapentin (NEURONTIN) 300 MG capsule   Yes No   Sig: TAKE 1 CAPSULE BY MOUTH ONCE DAILY AT BEDTIME FOR 7 DAYS, THEN INCREASE TO 2 CAPSULES DAILY AT BEDTIME   levETIRAcetam (KEPPRA) 500 MG tablet   Yes No   naproxen (NAPROSYN) 500 MG tablet   Yes No   Sig: Take 1 tablet by mouth 2 times daily   nortriptyline (PAMELOR) 75 MG  capsule   Yes No   Sig: TAKE 1 CAPSULE BY MOUTH AT BEDTIME*   sertraline (ZOLOFT) 50 MG tablet   Yes No   Sig: Take 50 mg by mouth daily   Patient not taking: Reported on 6/5/2023      Facility-Administered Medications: None     Allergies   No Known Allergies    Social History   Social History     Tobacco Use    Smoking status: Never    Smokeless tobacco: Never   Substance Use Topics    Alcohol use: Yes     Alcohol/week: 0.0 standard drinks of alcohol     Social History     Social History Narrative    Not on file     Family History   Family history reviewed with patient and significant for father with previous MI at age of 44.     Review of Systems   A Comprehensive greater than 10 system review of systems was carried out.  Pertinent positives and negatives are noted above.  Otherwise negative for contributory information.    Physical Exam   Temp: 98.3  F (36.8  C)   BP: (!) 125/92 Pulse: 96   Resp: 20 SpO2: 97 % O2 Device: None (Room air)    Vital Signs with Ranges  Temp:  [98.3  F (36.8  C)] 98.3  F (36.8  C)  Pulse:  [89-96] 96  Resp:  [20-24] 20  BP: (118-125)/(86-93) 125/92  SpO2:  [97 %-99 %] 97 %  202 lbs 9.64 oz    GEN:  Alert, oriented x 3, appears comfortable sitting up on gurney, no overt distress  HEENT:  Normocephalic/atraumatic, no scleral icterus, no nasal discharge, mouth moist.  CV:  Regular rate and rhythm, no murmur or JVD.  S1 + S2 noted, no S3 or S4.  LUNGS:  Clear to auscultation bilaterally except for crackles in bases. Symmetric chest rise on inhalation noted.  ABD:  Active bowel sounds, soft, non-tender/non-distended.  No rebound/guarding/rigidity.  EXT: mild bilateral nonpitting LE edema.  No cyanosis.  No acute joint synovitis noted.  SKIN:  Dry to touch, no exanthems noted in the visualized areas.  NEURO:  Symmetric muscle strength, sensation to touch grossly intact.  Coordination symmetric on general exam.  No new focal deficits appreciated.    Data   Data reviewed today:  I personally  reviewed EKG showing rate of 96 bpm in sinus rhythm with possible left atrial enlargement, LBBB.    Results for orders placed or performed during the hospital encounter of 11/18/24   XR Chest 2 Views     Status: None    Narrative    CHEST TWO VIEWS  11/18/2024 3:44 PM     HISTORY:  Shortness of breath.    COMPARISON: 3/4/2016.      Impression    IMPRESSION: Cardiac enlargement and bilateral pulmonary venous  congestion are new since the previous exam. Lungs clear. Small right  pleural effusion. No pneumothorax. Aortic calcification.    DALI FOY MD         SYSTEM ID:  [id number]    Lower Extremity Venous Duplex Bilateral     Status: None    Narrative    [facility]  DATE: 11/18/2024    EXAM: BILATERAL LOWER EXTREMITY VENOUS ULTRASOUND    INDICATION: Lower extremity swelling, Shortness of breath, concern for  PE DVT     TECHNIQUE: Duplex imaging was performed utilizing gray-scale,  compression, augmentation as appropriate, color-flow, Doppler waveform  analysis, and spectral Doppler imaging.    COMPARISON: No pertinent comparison study is available for review.    FINDINGS:  RIGHT LEG: The common femoral, profunda femoral, femoral, popliteal,  and segmentally visualized calf veins are patent and compressible with  appropriate vascular waveforms. No deep venous thrombus is identified.    LEFT LEG: The common femoral, profunda femoral, femoral, popliteal,  and segmentally visualized calf veins are patent and compressible with  appropriate vascular waveforms. No deep venous thrombus is identified.      Impression    IMPRESSION:   1.  RIGHT LEG: Negative for deep vein thrombosis.  2.  LEFT LEG: Negative for deep vein thrombosis.    EDELMIRA CASON MD         SYSTEM ID:  [id number]   [place] Draw     Status: None    Narrative    The following orders were created for panel order [place] Draw.  Procedure                               Abnormality         Status                     ---------                                -----------         ------                     Extra Blue Top Tube[[id number]]                              Final result               Extra Red Top Tube[[id number]]                               Final result                 Please view results for these tests on the individual orders.   Basic metabolic panel     Status: Normal   Result Value Ref Range    Sodium 138 135 - 145 mmol/L    Potassium 4.6 3.4 - 5.3 mmol/L    Chloride 101 98 - 107 mmol/L    Carbon Dioxide (CO2) 24 22 - 29 mmol/L    Anion Gap 13 7 - 15 mmol/L    Urea Nitrogen 17.0 6.0 - 20.0 mg/dL    Creatinine 1.05 0.67 - 1.17 mg/dL    GFR Estimate 82 >60 mL/min/1.73m2    Calcium 9.3 8.8 - 10.4 mg/dL    Glucose 91 70 - 99 mg/dL   Troponin T, High Sensitivity     Status: Normal   Result Value Ref Range    Troponin T, High Sensitivity 21 <=22 ng/L   Extra Blue Top Tube     Status: None   Result Value Ref Range    Hold Specimen JIC    Extra Red Top Tube     Status: None   Result Value Ref Range    Hold Specimen JIC    CBC with platelets and differential     Status: None   Result Value Ref Range    WBC Count 8.6 4.0 - 11.0 10e3/uL    RBC Count 5.10 4.40 - 5.90 10e6/uL    Hemoglobin 15.4 13.3 - 17.7 g/dL    Hematocrit 44.9 40.0 - 53.0 %    MCV 88 78 - 100 fL    MCH 30.2 26.5 - 33.0 pg    MCHC 34.3 31.5 - 36.5 g/dL    RDW 12.1 10.0 - 15.0 %    Platelet Count 236 150 - 450 10e3/uL    % Neutrophils 73 %    % Lymphocytes 18 %    % Monocytes 7 %    % Eosinophils 1 %    % Basophils 1 %    % Immature Granulocytes 0 %    NRBCs per 100 WBC 0 <1 /100    Absolute Neutrophils 6.3 1.6 - 8.3 10e3/uL    Absolute Lymphocytes 1.6 0.8 - 5.3 10e3/uL    Absolute Monocytes 0.6 0.0 - 1.3 10e3/uL    Absolute Eosinophils 0.0 0.0 - 0.7 10e3/uL    Absolute Basophils 0.1 0.0 - 0.2 10e3/uL    Absolute Immature Granulocytes 0.0 <=0.4 10e3/uL    Absolute NRBCs 0.0 10e3/uL   Influenza A/B, RSV and SARS-CoV2 PCR (COVID-19) Nasopharyngeal     Status: Normal    Specimen: Nasopharyngeal; Swab    Result Value Ref Range    Influenza A PCR Negative Negative    Influenza B PCR Negative Negative    RSV PCR Negative Negative    SARS CoV2 PCR Negative Negative    Narrative    Testing was performed using the Xpert Xpress CoV2/Flu/RSV Assay on the Cepheid GeneXpert Instrument. This test should be ordered for the detection of SARS-CoV2, influenza, and RSV viruses in individuals with signs and symptoms of respiratory tract infection. This test is for in vitro diagnostic use under the [place] FDA for laboratories certified under CLIA to perform high or moderate complexity testing. This test has been [place] FDA cleared. A negative result does not rule out the presence of PCR inhibitors in the specimen or target RNA in concentration below the limit of detection for the assay. If only one viral target is positive but coinfection with multiple targets is suspected, the sample should be re-tested with another FDA cleared, approved, or authorized test, if coninfection would change clinical management. This test was validated by the [facility]. These laboratories are certified under the Clinical Laboratory Improvement Amendments of 1988 (CLIA-88) as qualified to perfom high complexity laboratory testing.   D dimer quantitative     Status: Normal   Result Value Ref Range    D-Dimer Quantitative 0.45 0.00 - 0.50 ug/mL FEU    Narrative    This D-dimer assay is intended for use in conjunction with a clinical pretest probability assessment model to exclude pulmonary embolism (PE) and deep venous thrombosis (DVT) in outpatients suspected of PE or DVT. The cut-off value is 0.50 ug/mL FEU.    For patients 50 years of age or older, the application of age-adjusted cut-off values for D-Dimer may increase the specificity without significant effect on sensitivity. The literature suggested calculation age adjusted cut-off in ug/L = age in years x 10 ug/L. The results in this laboratory are reported as ug/mL rather than ug/L.  The calculation for age adjusted cut off in ug/mL= age in years x 0.01 ug/mL. For example, the cut off for a 76 year old male is 76 x 0.01 ug/mL = 0.76 ug/mL (760 ug/L).    M Otto et al. Age adjusted D-dimer cut-off levels to rule out pulmonary embolism: The ADJUST-PE Study. MANGO 2014;311:7268-8083.; HJ Radha et al. Diagnostic accuracy of conventional or age adjusted D-dimer cutoff values in older patients with suspected venous thromboembolism. Systemic review and meta-analysis. BMJ 2013:346:f2492.   INR     Status: Normal   Result Value Ref Range    INR 0.99 0.85 - 1.15   Blood gas venous     Status: Abnormal   Result Value Ref Range    pH Venous 7.44 (H) 7.32 - 7.43    pCO2 Venous 37 (L) 40 - 50 mm Hg    pO2 Venous 63 (H) 25 - 47 mm Hg    Bicarbonate Venous 25 21 - 28 mmol/L    Base Excess/Deficit Venous 1.2 -3.0 - 3.0 mmol/L    FIO2 0     Oxyhemoglobin Venous 92 (H) 70 - 75 %    O2 Sat, Venous 93.6 (H) 70.0 - 75.0 %    Narrative    In healthy individuals, oxyhemoglobin (O2Hb) and oxygen saturation (SO2) are approximately equal. In the presence of dyshemoglobins, oxyhemoglobin can be considerably lower than oxygen saturation.   BNP     Status: Abnormal   Result Value Ref Range    N terminal Pro BNP Inpatient 4,808 (H) 0 - 900 pg/mL   TSH with free T4 reflex     Status: Normal   Result Value Ref Range    TSH 4.12 0.30 - 4.20 uIU/mL   Troponin T, High Sensitivity     Status: Normal   Result Value Ref Range    Troponin T, High Sensitivity 21 <=22 ng/L   EKG 12-lead, tracing only     Status: None   Result Value Ref Range    Systolic Blood Pressure  mmHg    Diastolic Blood Pressure  mmHg    Ventricular Rate 96 BPM    Atrial Rate 96 BPM    SC Interval 162 ms    QRS Duration 180 ms     ms    QTc 538 ms    P Axis 54 degrees    R AXIS -20 degrees    T Axis 135 degrees    Interpretation ECG       Sinus rhythm  Possible Left atrial enlargement  Left bundle branch block  Abnormal ECG  When compared with ECG of  18-Nov-2011 21:10,  MANUAL COMPARISON REQUIRED PREVIOUS ECG IS INCOMPATIBLE  Unconfirmed report - interpretation of this ECG is computer generated - see medical record for final interpretation  Confirmed by - EMERGENCY ROOM, PHYSICIAN ([id number]),  Burt Oh ([id number]) on 11/18/2024 2:51:10 PM     CBC with Platelets & Differential     Status: None    Narrative    The following orders were created for panel order CBC with Platelets & Differential.  Procedure                               Abnormality         Status                     ---------                               -----------         ------                     CBC with platelets and d...[[id number]]                      Final result                 Please view results for these tests on the individual orders.     Sarahy Montano PA-C  [facility]  Securely message with the [employer] Web Console (learn more here)  Text page via go2 media Paging/Directory  I discussed the patient with Dr. Tom and he agrees with the above plan.

## 2024-11-19 NOTE — ED NOTES
"[facility]  ED Nurse Handoff Report    ED Chief complaint: Shortness of Breath  . ED Diagnosis:   Final diagnoses:   Acute congestive heart failure, unspecified heart failure type (H)   LBBB (left bundle branch block)       Allergies: No Known Allergies    Code Status: Full Code    Activity level - Baseline/Home:  independent.  Activity Level - Current:   independent.   Lift room needed: No.   Bariatric: No   Needed: No   Isolation: No.   Infection: Not Applicable.     Respiratory status: Room air    Vital Signs (within 30 minutes):   Vitals:    11/18/24 1426 11/18/24 1429 11/18/24 1623 11/18/24 1745   BP:  (!) 118/93 121/86 (!) 125/92   BP Location:    Left arm   Patient Position:    Semi-Damon's   Cuff Size:    Adult Regular   Pulse:  95 89 96   Resp:  24  20   Temp:  98.3  F (36.8  C)     SpO2:  99% 98% 97%   Weight: 91.9 kg (202 lb 9.6 oz)      Height: 1.778 m (5' 10\")          Cardiac Rhythm:  ,      Pain level:    Patient confused: No.   Patient Falls Risk: nonskid shoes/slippers when out of bed and patient and family education.   Elimination Status: Has voided     Patient Report - Initial Complaint: Shortness of Breath.   Focused Assessment: Clement Robert is a 59 year old male with history of epilepsy and anxiety who presents to the ED for evaluation of shortness of breath. Patient presents with 2 weeks of worsening shortness of breath, dyspnea on exertion, and palpitations. Last night, Harshil states he was only able james get 1.5 hours of sleep as his heart rate was higher than normal and he could not calm down/relax. He notes heartburn that has been ongoing for months that acutely worsened in the past 2 days with a globus sensation in his throat and \"burning\" in his upper chest. This usually resolves in an hour with OTC medications and TUMS. The feeling is triggered with eating. Endorses minor dry cough and cold sweats last night. History of left bundle branch block during " "COVID-19. He had one episode of A-Fib with subsequent cardioversion following head trauma. Denies nausea, diaphoresis, shortness of breath with the GERD flare up, abdominal pain, or recent long travel. Patient reports variable heart rate readings on his watch and discomfort when lying flat. He is concerned as he \"cannot catch his breath.\"       Abnormal Results:   Labs Ordered and Resulted from Time of ED Arrival to Time of ED Departure   BLOOD GAS VENOUS - Abnormal       Result Value    pH Venous 7.44 (*)     pCO2 Venous 37 (*)     pO2 Venous 63 (*)     Bicarbonate Venous 25      Base Excess/Deficit Venous 1.2      FIO2 0      Oxyhemoglobin Venous 92 (*)     O2 Sat, Venous 93.6 (*)    NT PROBNP INPATIENT - Abnormal    N terminal Pro BNP Inpatient 4,808 (*)    BASIC METABOLIC PANEL - Normal    Sodium 138      Potassium 4.6      Chloride 101      Carbon Dioxide (CO2) 24      Anion Gap 13      Urea Nitrogen 17.0      Creatinine 1.05      GFR Estimate 82      Calcium 9.3      Glucose 91     TROPONIN T, HIGH SENSITIVITY - Normal    Troponin T, High Sensitivity 21     INFLUENZA A/B, RSV AND SARS-COV2 PCR - Normal    Influenza A PCR Negative      Influenza B PCR Negative      RSV PCR Negative      SARS CoV2 PCR Negative     D DIMER QUANTITATIVE - Normal    D-Dimer Quantitative 0.45     INR - Normal    INR 0.99     TSH WITH FREE T4 REFLEX - Normal    TSH 4.12     TROPONIN T, HIGH SENSITIVITY - Normal    Troponin T, High Sensitivity 21     CBC WITH PLATELETS AND DIFFERENTIAL    WBC Count 8.6      RBC Count 5.10      Hemoglobin 15.4      Hematocrit 44.9      MCV 88      MCH 30.2      MCHC 34.3      RDW 12.1      Platelet Count 236      % Neutrophils 73      % Lymphocytes 18      % Monocytes 7      % Eosinophils 1      % Basophils 1      % Immature Granulocytes 0      NRBCs per 100 WBC 0      Absolute Neutrophils 6.3      Absolute Lymphocytes 1.6      Absolute Monocytes 0.6      Absolute Eosinophils 0.0      Absolute " Basophils 0.1      Absolute Immature Granulocytes 0.0      Absolute NRBCs 0.0     BLOOD CULTURE        US Lower Extremity Venous Duplex Bilateral   Final Result   IMPRESSION:    1.  RIGHT LEG: Negative for deep vein thrombosis.   2.  LEFT LEG: Negative for deep vein thrombosis.      EDELMIRA CASON MD            SYSTEM ID:  [id number]      XR Chest 2 Views   Final Result   IMPRESSION: Cardiac enlargement and bilateral pulmonary venous   congestion are new since the previous exam. Lungs clear. Small right   pleural effusion. No pneumothorax. Aortic calcification.      DALI FOY MD            SYSTEM ID:  [id number]      Echocardiogram Complete    (Results Pending)       Treatments provided: see above.  Family Comments: wife may visit later  OBS brochure/video discussed/provided to patient:  No  ED Medications:   Medications   ipratropium - albuterol 0.5 mg/2.5 mg/3 mL (DUONEB) neb solution 3 mL (3 mLs Nebulization $Given 11/18/24 1613)   methylPREDNISolone Na Suc (solu-MEDROL) injection 125 mg (125 mg Intravenous $Given 11/18/24 1612)   furosemide (LASIX) injection 60 mg (60 mg Intravenous $Given 11/18/24 1732)       Drips infusing:  No  For the majority of the shift this patient was Green.   Interventions performed were see above.    Sepsis treatment initiated: No    Cares/treatment/interventions/medications to be completed following ED care: Pt A&O x4. VSS on RA. Patient denies any pain. Ambulation and Voiding: independent.     ED Nurse Name: Ganesh Mazariegos RN  6:57 PM

## 2024-11-19 NOTE — PLAN OF CARE
"Goal Outcome Evaluation:      Plan of Care Reviewed With: patient     Overall pt assessment : Improving     Outcome Evaluation:   Alert and oriented x 4 , RA , Afebrile SatO2 in the 90's . Denied chest pain , SOB palpitation , headache , blurring of vision , lightheadedness or Gi symptoms .  There was episodes of 8 beats of Multifocal PVC 's on Telemetry reading . Pt was ambulated to bathroom during the episode. Pt denied chest pain , palpitation, lightheadedness or GI symptoms. VS after the tech report : on RA :  /82 , P 81 RR 16 SaO2 93 Temp 98.     Problem: Adult Inpatient Plan of Care  Goal: Plan of Care Review  Description: The Plan of Care Review/Shift note should be completed every shift.  The Outcome Evaluation is a brief statement about your assessment that the patient is improving, declining, or no change.  This information will be displayed automatically on your shift  note.  Outcome: Progressing  Flowsheets (Taken 11/19/2024 1373)  Outcome Evaluation: vs  Plan of Care Reviewed With: patient  Overall Patient Progress: improving  Goal: Patient-Specific Goal (Individualized)  Description: You can add care plan individualizations to a care plan. Examples of Individualization might be:  \"Parent requests to be called daily at 9am for status\", \"I have a hard time hearing out of my right ear\", or \"Do not touch me to wake me up as it startles  me\".  Outcome: Progressing  Goal: Absence of Hospital-Acquired Illness or Injury  Outcome: Progressing  Intervention: Identify and Manage Fall Risk  Recent Flowsheet Documentation  Taken 11/19/2024 0514 by Angely Bains, RN  Safety Promotion/Fall Prevention: safety round/check completed  Taken 11/18/2024 2146 by Angely Bains, RN  Safety Promotion/Fall Prevention: safety round/check completed  Taken 11/18/2024 2028 by Angely Bains, RN  Safety Promotion/Fall Prevention: safety round/check completed  Taken 11/18/2024 1900 by Angely Bains, RN  Safety " Promotion/Fall Prevention:   safety round/check completed   clutter free environment maintained   lighting adjusted   nonskid shoes/slippers when out of bed   patient and family education   room near nurse's station   room organization consistent  Intervention: Prevent Skin Injury  Recent Flowsheet Documentation  Taken 11/18/2024 1900 by Angely Bains, RN  Body Position:   position changed independently   side-lying 30 degrees  Goal: Optimal Comfort and Wellbeing  Outcome: Progressing  Goal: Readiness for Transition of Care  Outcome: Progressing     Problem: Dysrhythmia  Goal: Normalized Cardiac Rhythm  Outcome: Progressing     Problem: Gas Exchange Impaired  Goal: Optimal Gas Exchange  Outcome: Progressing  Intervention: Optimize Oxygenation and Ventilation  Recent Flowsheet Documentation  Taken 11/18/2024 1900 by Angely Bains, RN  Head of Bed (HOB) Positioning: HOB at 20-30 degrees

## 2024-11-19 NOTE — PROGRESS NOTES
[facility]  Hospitalist Progress Note  Octavio Lepe M.D., M.B.A.   11/19/2024    Reason for Stay/active problem list    New Acute systolic CHF   Cardiomyopathy          Assessment and Plan:        Summary of Stay:     Clement Robert is a 59 year old male with PMH significant for A-fib s/p cardioversion not on anticoagulation, anxiety, migraine headaches, and seizure disorder who presents to the ED per recommendation from  on 11/18/2024 for evaluation of shortness of breath.     ED workup reveals: BP mildly elevated otherwise VSS, BMP unremarkable, CBC WNL, troponin x2 of  21, D-dimer WNL, INR WNL, proBNP of 4808, TSH WNL, COVID/flu/RSV negative, VBG shows pH of 7.44, pCO2 of 37, pO2 of 63, and HCO3 of 25, blood culture obtained and pending, EKG shows rate of 96 bpm in sinus rhythm with possible left atrial enlargement, LBBB, chest x-ray shows cardiac enlargement and bilateral pulmonary venous congestion are new since previous exam, lungs clear, small right pleural effusion, no pneumothorax, and bilateral lower extremity ultrasound negative for DVT.     Problem List with Assessment and Plan:    #New onset CHF  -- presents with CORREA  -- was treated with lasix and admitted to cardiac tele   -- echo done this adm showed severe LV dysfunction reported as below       Interpretation Summary  Left ventricular systolic function is severely reduced.Biplane LVEF is  22%.There is severe global hypokinesia of the left ventricle.The left  ventricle is severely dilated.  The right ventricular systolic function is normal.  There is mild (1+) mitral regurgitation.  IVC diameter <2.1 cm collapsing >50% with sniff suggests a normal RA pressure  of 3 mmHg.     Stress echo dated 02/4/2009 noted normal LVEF at rest    --cardiology consulted and input pending , suspect CAD , await recommendation . Patient is anxious about the diagnosis , will add prn ativan     #H/o A-fib s/p cardioversion  --Hit by hockey puck  "in around 2011 where he was found to be in A-fib when seen in the ED and cardioverted. No recurrent episodes, not on rate controlling medication, and not anticoagulated. Currently in SR.     #Migraine headaches  -continue PTA Nortriptyline      #Seizure disorder  No recent seizures  -continue PTA Keppra     #ROYCE  -continue PTA Buspar          VTE Prophylaxis: Ambulate every shift  Code Status: Full Code  Diet: Low Saturated Fat Na <2400 mg  NPO for Medical/Clinical Reasons Except for: Meds    Frederick Catheter: Not present          Plan for today:  Cardiology in put pending   Ativan  prn       Anticipated Disposition :  home        Medically Ready for Discharge: Anticipated in 2-4 Days  : pending progress and cardiac work up           Interval History (Subjective):        Patient is seen and examined by me today and medical record reviewed.Overnight events noted and care discussed with nursing staff.  doing well; no cp, sob, n/v/d, or abd pain.  Had several questions , all addressed                           Physical Exam:        Last Vital Signs:  /78 (BP Location: Right arm)   Pulse 89   Temp 98.1  F (36.7  C) (Oral)   Resp 18   Ht 1.778 m (5' 10\")   Wt 86.6 kg (190 lb 14.4 oz)   SpO2 96%   BMI 27.39 kg/m      I/O last 3 completed shifts:  In: -   Out: 1180 [Urine:1180]    Wt Readings from Last 5 Encounters:   11/19/24 86.6 kg (190 lb 14.4 oz)   03/04/16 89.8 kg (198 lb)   11/18/11 86.2 kg (190 lb)        Constitutional: Awake, alert, cooperative, no apparent distress     Respiratory: Clear to auscultation bilaterally, no crackles or wheezing   Cardiovascular: Regular rate and rhythm, normal S1 and S2, and no murmur noted   Abdomen: Normal bowel sounds, soft, non-distended, non-tender   Skin: No new rashes, no cyanosis, dry to touch   Neuro: Alert with  no new focal weakness   Extremities: No edema   Other(s):        All other systems: Negative          Medications:        All current medications were " "reviewed with changes reflected in problem list.         Data:      All new lab and imaging data was reviewed.      Data reviewed today: I reviewed all new labs and imaging results over the last 24 hours. I personally reviewed       Recent Labs   Lab 11/18/24  1441   WBC 8.6   HGB 15.4   HCT 44.9   MCV 88        No results for input(s): \"CULT\" in the last 168 hours.  Recent Labs   Lab 11/19/24  0642 11/18/24  1441    138   POTASSIUM 4.2 4.6   CHLORIDE 103 101   CO2 21* 24   ANIONGAP 15 13   * 91   BUN 18.8 17.0   CR 0.83 1.05   GFRESTIMATED >90 82   JAMES 9.1 9.3       Recent Labs   Lab 11/19/24  0642 11/18/24  1441   * 91       Recent Labs   Lab 11/18/24  1441   INR 0.99         No results for input(s): \"TROPONIN\", \"TROPI\", \"TROPR\" in the last 168 hours.    Invalid input(s): \"TROP\", \"TROPONINIES\"    Recent Results (from the past 48 hours)   XR Chest 2 Views    Narrative    CHEST TWO VIEWS  11/18/2024 3:44 PM     HISTORY:  Shortness of breath.    COMPARISON: 3/4/2016.      Impression    IMPRESSION: Cardiac enlargement and bilateral pulmonary venous  congestion are new since the previous exam. Lungs clear. Small right  pleural effusion. No pneumothorax. Aortic calcification.    DALI FOY MD         SYSTEM ID:  [id number]   US Lower Extremity Venous Duplex Bilateral    Narrative    [facility]  DATE: 11/18/2024    EXAM: BILATERAL LOWER EXTREMITY VENOUS ULTRASOUND    INDICATION: Lower extremity swelling, Shortness of breath, concern for  PE DVT     TECHNIQUE: Duplex imaging was performed utilizing gray-scale,  compression, augmentation as appropriate, color-flow, Doppler waveform  analysis, and spectral Doppler imaging.    COMPARISON: No pertinent comparison study is available for review.    FINDINGS:  RIGHT LEG: The common femoral, profunda femoral, femoral, popliteal,  and segmentally visualized calf veins are patent and compressible with  appropriate vascular waveforms. No deep " venous thrombus is identified.    LEFT LEG: The common femoral, profunda femoral, femoral, popliteal,  and segmentally visualized calf veins are patent and compressible with  appropriate vascular waveforms. No deep venous thrombus is identified.      Impression    IMPRESSION:   1.  RIGHT LEG: Negative for deep vein thrombosis.  2.  LEFT LEG: Negative for deep vein thrombosis.    EDELMIRA CASON MD         SYSTEM ID:  [id number]   Echocardiogram Complete   Result Value    Biplane LVEF 22%    [facility]    [id number]  [id number]  [id number]  [id number]^TRACI^GIACOMO^MAVIS     [facility]  Echocardiography Laboratory  [address] [place] [address]     Name: LUNA JACOBSON  MRN: [id number]  : 1965  Study Date: 2024 09:28 AM  Age: 59 yrs  Gender: Male  Patient Location: [facility]  Reason For Study: Heart Failure  Ordering Physician: GIACOMO AVILES  Performed By: Paola Penny     BSA: 2.1 m2  Height: 70 in  Weight: 196 lb  BP: 123/85 mmHg  ______________________________________________________________________________  Procedure  Complete Portable Echo Adult. Optison (NDC #7822-5772) given intravenously.  ______________________________________________________________________________  Interpretation Summary     Left ventricular systolic function is severely reduced.Biplane LVEF is  22%.There is severe global hypokinesia of the left ventricle.The left  ventricle is severely dilated.  The right ventricular systolic function is normal.  There is mild (1+) mitral regurgitation.  IVC diameter <2.1 cm collapsing >50% with sniff suggests a normal RA pressure  of 3 mmHg.     Stress echo dated 2009 noted normal LVEF at rest  ______________________________________________________________________________  Left Ventricle  The left ventricle is severely dilated. There is normal left ventricular wall  thickness. Left ventricular systolic function is severely reduced. Biplane  LVEF is 22%. There is  severe global hypokinesia of the left ventricle.     Right Ventricle  The right ventricle is normal size. The right ventricular systolic function is  normal.     Atria  The left atrium is mild to moderately dilated. Right atrial size is normal.  There is no color Doppler evidence of an atrial shunt.     Mitral Valve  There is mild (1+) mitral regurgitation.     Tricuspid Valve  There is trace tricuspid regurgitation. The right ventricular systolic  pressure is approximated at 27.7 mmHg plus the right atrial pressure.     Aortic Valve  The aortic valve is trileaflet. No aortic regurgitation is present. No aortic  stenosis is present.     Pulmonic Valve  There is no pulmonic valvular stenosis.     Vessels  Borderline dilated aortic root measuring 3.9 cm. Normal size ascending aorta.  IVC diameter <2.1 cm collapsing >50% with sniff suggests a normal RA pressure  of 3 mmHg.     Pericardium  There is no pericardial effusion.     ______________________________________________________________________________  MMode/2D Measurements & Calculations  IVSd: 0.85 cm  LVIDd: 7.7 cm  LVIDs: 6.6 cm  LVPWd: 0.76 cm     FS: 14.2 %  LV mass(C)d: 289.0 grams  LV mass(C)dI: 139.6 grams/m2  Ao root diam: 3.9 cm  LVOT diam: 2.5 cm  LVOT area: 4.9 cm2  Ao root diam index Ht(cm/m): 2.2  Ao root diam index BSA (cm/m2): 1.9  EF Biplane: 21.5 %  LA Volume (BP): 86.5 ml  LA Volume Index (BP): 41.8 ml/m2  RWT: 0.20     TAPSE: 2.0 cm     Doppler Measurements & Calculations  LV V1 max P.7 mmHg  LV V1 max: 81.8 cm/sec  LV V1 VTI: 15.4 cm  SV(LVOT): 74.7 ml  SI(LVOT): 36.1 ml/m2  PA acc time: 0.11 sec  TR max santa: 262.9 cm/sec  TR max P.7 mmHg     ______________________________________________________________________________  Report approved by: Colin Rehman 2024 10:13 AM             COVID Status:  COVID-19 PCR Results          2024    15:18   COVID-19 PCR Results   SARS CoV2 PCR Negative      COVID-19 Antibody Results,  Testing for Immunity           No data to display                 Disclaimer: This note consists of symbols derived from keyboarding, dictation and/or voice recognition software. As a result, there may be errors in the script that have gone undetected. Please consider this when interpreting information found in this chart.

## 2024-11-19 NOTE — CONSULTS
"[facility]    Cardiology Consultation     Clement Robert MRN#: [id number]   YOB: 1965 Age: 59 year old     Date of Admission:  11/18/2024    Consult Indication:  CHF    Assessment & Plan     # Acute HFrEF LVEF 20-25%, new dx, chronicity unknown, etiology unknown.  He denies symptoms concerning for recent ischemic event.  Father passed away from \"heart attack\" in his 40s, details unclear.  # Remote history of paroxysmal atrial fibrillation   # Chronic LBBB, would need to consider CRT-D if appropriate     -Reviewed prior cardiac history in detail  - Discussed options for further evaluation and management including non-invasive stress testing, or cardiac catheterization/coronary angiography +/- PCI.  Reviewed the risks and benefits of each option at length.   - Recommend cardiac catheterization/coronary angiography +/- PCI.   - Discussed the risks of cardiac catheterization/angiography +/- PCI that include but are not limited to the risk of stroke, heart attack, death, cardiac injury, emergent intervention such as stenting or bypass, contrast induced allergic reaction, renal dysfunction (including risks of temporary or permanent dialysis), and complications related to sedation and respiratory/pulmonary compromise, vascular complications (including bleeding and blood transfusion). Patient denies any major active bleeding issues and is willing to take and comply with dual antiplatelet therapy and understands the associated bleeding risks. Patient understands the overall risks of the procedure and wishes to proceed.  - N.p.o. at midnight  - Agree with continued volume optimization with goal net -2 to 3 L over 24 hours, though seems close to euvolemic clinically, feels at baseline after diuresis  - Will start low-dose Entresto tonight  - Will start low-dose metoprolol succinate  - Anticipate starting Jardiance tomorrow  - Will plan on low-dose aldosterone antagonist in the outpatient " setting  - Added on inflammation markers, though troponin normal and so myocarditis is less likely   - Discussed the issues of anticoagulation for prevention of stroke given history of atrial fibrillation, [id number] at least 1, discussed risks/benefits, after discussion patient would like to start anticoagulation.  No hx of abnormal bleeding.  Will plan on apixaban after cardiac catheterization  - If coronary angiogram is unrevealing, will plan an outpatient cardiac MRI  - Cardiac telemetry.  Monitor electrolytes daily, maintain potassium greater than 4, magnesium greater than 2  - LifeVest on discharge   - Cardiology will follow      Please do not hesitate to page with any questions or concerns.     Chaparro Richardson MD, [facility]  Cardiology  November 19, 2024    Voice recognition software utilized.   High complexity     History of Present Illness     Patient is a 59-year-old male with past medical history significant for seizure disorder, chronic left bundle branch block, paroxysmal atrial fibrillation, who presents with progressive dyspnea on exertion    Patient has a remote history of atrial fibrillation, had been seen by my colleagues Dr. Saba and Kerry Barcenas in 2011.  At that time he had recently been diagnosed with atrial fibrillation, underwent cardioversion.  Coronary CTA 11/2011 calcium score 0, some mild soft plaque in the left circumflex artery.  Stress echocardiogram 2009 negative.  [id number] was 0, and so was on aspirin monotherapy at that time.  Started on simvastatin as well.  Since then Lexiscan stress test 3/16/2020 was negative for inducible myocardial ischemia or infarction.  He had a left bundle branch block at that time.    With that background in mind, patient presented to the ED 11/18/2024 with dyspnea over the preceding 2 weeks.  He had also noticed some intermittent tachycardia.  No chest pain, no abnormal lower extremity swelling.  He does note symptoms of  "orthopnea/PND a couple of days ago.  In the ED initial blood pressure was 118/93 mmHg.  ECG demonstrated borderline sinus tachycardia at approximately 100 bpm, left bundle branch block, QTc 538 ms.  Initial labs in the ED notable for NT proBNP 4800, high-sensitivity troponin normal, TSH normal, electrolytes normal, CBC normal.  .  He was admitted to the Internal Medicine service.  He was started on diuretics.  TTE 11/19/2024 demonstrated LVEF 22% with severe global hypokinesis, severe LV dilatation LVIDD 7.7 cm, normal RV function, mild MR.    He does note that his wife had URI symptoms a few weeks ago, and he has had some coughing a couple of weeks ago, also received the COVID/flu vaccine combination a couple of weeks ago.    Family history notable for father dying from \"heart attack in his 40s\", details unclear.      Past Medical History   Past Medical History:   Diagnosis Date    Anxiety     Seizures (H)        Past Surgical History   No past surgical history on file.    Prior to Admission Medications   Prior to Admission Medications   Prescriptions Last Dose Informant Patient Reported? Taking?   busPIRone (BUSPAR) 5 MG tablet 11/18/2024 Morning  Yes Yes   Sig: Take 5 mg by mouth every morning.   busPIRone (BUSPAR) 5 MG tablet 11/17/2024  Yes Yes   Sig: Take 10 mg by mouth at bedtime.   levETIRAcetam (KEPPRA) 500 MG tablet 11/18/2024 Morning  Yes Yes   Sig: Take 500 mg by mouth every morning.   levETIRAcetam (KEPPRA) 500 MG tablet 11/17/2024  Yes Yes   Sig: Take 1,000 mg by mouth at bedtime.   naproxen (NAPROSYN) 500 MG tablet Past Week  Yes Yes   Sig: Take 1 tablet by mouth 2 times daily as needed for headaches.   nortriptyline (PAMELOR) 75 MG capsule 11/17/2024 Bedtime  Yes Yes   Sig: TAKE 1 CAPSULE BY MOUTH AT BEDTIME*      Facility-Administered Medications: None     Current Facility-Administered Medications   Medication Dose Route Frequency Provider Last Rate Last Admin    busPIRone (BUSPAR) tablet 10 " mg  10 mg Oral At Bedtime Sarahy Montano PA-C   10 mg at 11/18/24 2155    busPIRone (BUSPAR) tablet 5 mg  5 mg Oral QAM Sarahy Montano PA-C   5 mg at 11/19/24 1031    calcium carbonate (TUMS) chewable tablet 1,000 mg  1,000 mg Oral 4x Daily PRN Sarahy Montano PA-C        furosemide (LASIX) injection 40 mg  40 mg Intravenous Q12H Octavio Lepe MD   40 mg at 11/19/24 1032    levETIRAcetam (KEPPRA) tablet 1,000 mg  1,000 mg Oral At Bedtime Sarahy Montano PA-C   1,000 mg at 11/18/24 2152    levETIRAcetam (KEPPRA) tablet 500 mg  500 mg Oral QAM Sarahy Montano PA-C   500 mg at 11/19/24 1031    lidocaine (LMX4) cream   Topical Q1H PRN Sarahy Montano PA-C        lidocaine 1 % 0.1-1 mL  0.1-1 mL Other Q1H PRN Sarahy Montano PA-C        nortriptyline (PAMELOR) capsule 75 mg  75 mg Oral At Bedtime Sarahy Montano PA-C   75 mg at 11/18/24 2256    ondansetron (ZOFRAN ODT) ODT tab 4 mg  4 mg Oral Q6H PRN Sarahy Montano PA-C        Or    ondansetron (ZOFRAN) injection 4 mg  4 mg Intravenous Q6H PRN Sarahy Montano PA-C        senna-docusate (SENOKOT-S/PERICOLACE) 8.6-50 MG per tablet 1 tablet  1 tablet Oral BID PRN Sarahy Montano PA-C        Or    senna-docusate (SENOKOT-S/PERICOLACE) 8.6-50 MG per tablet 2 tablet  2 tablet Oral BID PRN Sarahy Montano PA-C        sodium chloride (PF) 0.9% PF flush 3 mL  3 mL Intracatheter Q8H Sarahy Montano PA-C   3 mL at 11/18/24 2207    sodium chloride (PF) 0.9% PF flush 3 mL  3 mL Intracatheter q1 min prn Sarahy Montano PA-C         Current Facility-Administered Medications   Medication Dose Route Frequency Provider Last Rate Last Admin    busPIRone (BUSPAR) tablet 10 mg  10 mg Oral At Bedtime Sarahy Montano PA-C   10 mg at 11/18/24 2155    busPIRone (BUSPAR) tablet 5 mg  5 mg Oral QAM Sarahy Montano PA-C   5 mg at 11/19/24 1031    calcium carbonate (TUMS) chewable tablet 1,000 mg  1,000 mg Oral  4x Daily PRN Sarahy Montano PA-C        furosemide (LASIX) injection 40 mg  40 mg Intravenous Q12H Octavio Lepe MD   40 mg at 11/19/24 1032    levETIRAcetam (KEPPRA) tablet 1,000 mg  1,000 mg Oral At Bedtime Sarahy Montano PA-C   1,000 mg at 11/18/24 2152    levETIRAcetam (KEPPRA) tablet 500 mg  500 mg Oral QAM Sarahy Montano PA-C   500 mg at 11/19/24 1031    lidocaine (LMX4) cream   Topical Q1H PRN Sarahy Montano PA-C        lidocaine 1 % 0.1-1 mL  0.1-1 mL Other Q1H PRN Sarahy Montano PA-C        nortriptyline (PAMELOR) capsule 75 mg  75 mg Oral At Bedtime Sarahy Montano PA-C   75 mg at 11/18/24 2256    ondansetron (ZOFRAN ODT) ODT tab 4 mg  4 mg Oral Q6H PRN Sarahy Montano PA-C        Or    ondansetron (ZOFRAN) injection 4 mg  4 mg Intravenous Q6H PRN Sarahy Montano PA-C        senna-docusate (SENOKOT-S/PERICOLACE) 8.6-50 MG per tablet 1 tablet  1 tablet Oral BID PRN Sarahy Montano PA-C        Or    senna-docusate (SENOKOT-S/PERICOLACE) 8.6-50 MG per tablet 2 tablet  2 tablet Oral BID PRN Sarahy Montano PA-C        sodium chloride (PF) 0.9% PF flush 3 mL  3 mL Intracatheter Q8H Sarahy Montano PA-C   3 mL at 11/18/24 2207    sodium chloride (PF) 0.9% PF flush 3 mL  3 mL Intracatheter q1 min prn Sarahy Montano PA-C         Allergies   No Known Allergies    Social History    reports that he has never smoked. He has never used smokeless tobacco. He reports current alcohol use. He reports that he does not use drugs.    Family History   I have reviewed this patient's family history and updated it with pertinent information if needed.  Family History   Problem Relation Age of Onset    Family History Negative Mother     Family History Negative Father           Review of Systems   A comprehensive review of system was performed and is negative other than that noted in the HPI or here.     Physical Exam   Vital Signs with Ranges  Temp:  [97.6  F  "(36.4  C)-98.3  F (36.8  C)] 98.1  F (36.7  C)  Pulse:  [] 89  Resp:  [16-24] 18  BP: (110-132)/() 113/78  SpO2:  [93 %-99 %] 96 %  Wt Readings from Last 4 Encounters:   24 86.6 kg (190 lb 14.4 oz)   16 89.8 kg (198 lb)   11 86.2 kg (190 lb)     I/O last 3 completed shifts:  In: -   Out: 1180 [Urine:1180]    Vital signs were personally reviewed:  Temperatures:  Current - Temp: 98.1  F (36.7  C); Max - Temp  Av  F (36.7  C)  Min: 97.6  F (36.4  C)  Max: 98.3  F (36.8  C)  Respiration range: Resp  Av  Min: 16  Max: 24  Pulse range: Pulse  Av.5  Min: 77  Max: 101  Blood pressure range: Systolic (24hrs), Av , Min:110 , Max:132   ; Diastolic (24hrs), Av, Min:72, Max:114    Pulse oximetry range: SpO2  Av %  Min: 93 %  Max: 99 %    Intake/Output Summary (Last 24 hours) at 2024 1121  Last data filed at 2024 1107  Gross per 24 hour   Intake 6 ml   Output 1180 ml   Net -1174 ml     190 lbs 14.4 oz  Body mass index is 27.39 kg/m .   Body surface area is 2.07 meters squared.    Physical Exam:   General/Constitutional: appears stated age, in no apparent distress, appears to be well nourished  Respiratory: clear to auscultation bilaterally, no wheezes, no rales, no increased work of breathing  Cardiovascular: JVP normal, regular rate, regular rhythm, no murmur appreciated, no lower extremity edema    Laboratory tests personally reviewed:   CMP  Recent Labs   Lab 24  0642 24  1441    138   POTASSIUM 4.2 4.6   CHLORIDE 103 101   CO2 21* 24   ANIONGAP 15 13   * 91   BUN 18.8 17.0   CR 0.83 1.05   GFRESTIMATED >90 82   JAMES 9.1 9.3     CBC  Recent Labs   Lab 24  1441   WBC 8.6   RBC 5.10   HGB 15.4   HCT 44.9   MCV 88   MCH 30.2   MCHC 34.3   RDW 12.1        INR  Recent Labs   Lab 24  1441   INR 0.99     No results found for: \"TROPI\", \"TROPONIN\", \"TROPR\", \"TROPN\"  Recent Labs   Lab Test 24  0642   CHOL 219*   HDL 65 " "  *   TRIG 47     No results found for: \"A1C\"  TSH   Date Value Ref Range Status   11/18/2024 4.12 0.30 - 4.20 uIU/mL Final       Clinically Significant Risk Factors Present on Admission                            # Overweight: Estimated body mass index is 27.39 kg/m  as calculated from the following:    Height as of this encounter: 1.778 m (5' 10\").    Weight as of this encounter: 86.6 kg (190 lb 14.4 oz).             Cardiovascular : Cardiomyopathy  "

## 2024-11-19 NOTE — CONSULTS
Patient has [employer] through an employer.    Xarelto/Eliquis:  $30/mo. Upon receipt of RX Discharge Pharmacy can provide copay savings card from [employer] or [employer]iqu[employer]com, respectively, to reduce this to $10/mo.    Jardiance:  $10/mo.     Farxiga:  $10/mo.     Entresto:  $30/mo. Patient is eligible to download a copay savings card from [employer] to reduce this to $10/mo.      Clarissa Ma  Pharmacy Technician/Liaison, Discharge Pharmacy   [phone] (voice or text)  [email or web address]  Pharmacy test claims are estimates and may not reflect final costs.  The alternative medications suggested by the Pharmacy Liaison are provided solely for cost-saving purposes, and their clinical suitability should be evaluated and confirmed by qualified medical professionals.

## 2024-11-19 NOTE — PLAN OF CARE
"Goal Outcome Evaluation:      Plan of Care Reviewed With: patient    Overall Patient Progress: improvingOverall Patient Progress: improving    Outcome Evaluation: Denies SOB, CP, Dizziness. VSS. Pt initially quite anxious - pt emotionally supported, questioned answered, educated about cards testing / meds / care plan - with improvement to anxiety. PRN ativan is ordered, but not needed. IV SL. Plan is angio tomorrow, NPO at midnight, tele on: LBBB prolonged QTC.      Problem: Adult Inpatient Plan of Care  Goal: Plan of Care Review  Description: The Plan of Care Review/Shift note should be completed every shift.  The Outcome Evaluation is a brief statement about your assessment that the patient is improving, declining, or no change.  This information will be displayed automatically on your shift  note.  11/19/2024 1502 by Manas Chawla, RN  Outcome: Progressing  Flowsheets (Taken 11/19/2024 1501)  Outcome Evaluation: Denies SOB, CP, Dizziness. VSS. Pt initially quite anxious - pt emotionally supported, questioned answered, educated about cards testing / meds / care plan - with improvement to anxiety. PRN ativan is ordered, but not needed. IV SL. Plan is angio tomorrow, NPO at midnight, tele on: LBBB prolonged QTC.  Plan of Care Reviewed With: patient  Overall Patient Progress: improving  11/19/2024 1006 by Manas Chawla, RN  Outcome: Progressing  Flowsheets (Taken 11/19/2024 0947)  Outcome Evaluation: Denies SOB, CP. AOx4, on RA. Echo being completed. Cards consult placed - pending visit. Pt up I, tolerating diet. Denies numbness tingling. Tele: BBB  Plan of Care Reviewed With: patient  Overall Patient Progress: improving  Goal: Patient-Specific Goal (Individualized)  Description: You can add care plan individualizations to a care plan. Examples of Individualization might be:  \"Parent requests to be called daily at 9am for status\", \"I have a hard time hearing out of my right ear\", or \"Do not touch me to wake me up " "as it startles  me\".  11/19/2024 1502 by Manas Chawla RN  Outcome: Progressing  11/19/2024 1006 by Manas Chawla RN  Outcome: Progressing  Goal: Absence of Hospital-Acquired Illness or Injury  11/19/2024 1502 by Manas Chawla RN  Outcome: Progressing  11/19/2024 1006 by Manas Chawla RN  Outcome: Progressing  Intervention: Identify and Manage Fall Risk  Recent Flowsheet Documentation  Taken 11/19/2024 1101 by Manas Chawla RN  Safety Promotion/Fall Prevention: safety round/check completed  Intervention: Prevent Skin Injury  Recent Flowsheet Documentation  Taken 11/19/2024 1101 by Manas Chawla RN  Body Position:   position changed independently   side-lying 30 degrees  Goal: Optimal Comfort and Wellbeing  11/19/2024 1502 by Manas Chawla RN  Outcome: Progressing  11/19/2024 1006 by Manas Chawla RN  Outcome: Progressing  Goal: Readiness for Transition of Care  11/19/2024 1502 by Manas Chawla RN  Outcome: Progressing  11/19/2024 1006 by Manas Chawla RN  Outcome: Progressing  Intervention: Mutually Develop Transition Plan  Recent Flowsheet Documentation  Taken 11/19/2024 1000 by Manas Chawla RN  Equipment Currently Used at Home: none     Problem: Dysrhythmia  Goal: Normalized Cardiac Rhythm  11/19/2024 1502 by Manas Chawla RN  Outcome: Progressing  11/19/2024 1006 by Manas Chawla RN  Outcome: Progressing     Problem: Gas Exchange Impaired  Goal: Optimal Gas Exchange  11/19/2024 1502 by Manas Chawla RN  Outcome: Progressing  11/19/2024 1006 by Manas Chawla RN  Outcome: Progressing  Intervention: Optimize Oxygenation and Ventilation  Recent Flowsheet Documentation  Taken 11/19/2024 1101 by Manas Chawla RN  Head of Bed (HOB) Positioning: HOB at 20-30 degrees     "

## 2024-11-20 ENCOUNTER — PATIENT OUTREACH (OUTPATIENT)
Dept: CARDIOLOGY | Facility: CLINIC | Age: 59
End: 2024-11-20
Payer: COMMERCIAL

## 2024-11-20 DIAGNOSIS — I48.91 A-FIB (H): ICD-10-CM

## 2024-11-20 DIAGNOSIS — I50.22 CHRONIC SYSTOLIC CONGESTIVE HEART FAILURE (H): Primary | ICD-10-CM

## 2024-11-20 DIAGNOSIS — R06.02 SOB (SHORTNESS OF BREATH): ICD-10-CM

## 2024-11-20 LAB
ANION GAP SERPL CALCULATED.3IONS-SCNC: 13 MMOL/L (ref 7–15)
BUN SERPL-MCNC: 25.8 MG/DL (ref 6–20)
CALCIUM SERPL-MCNC: 9.2 MG/DL (ref 8.8–10.4)
CHLORIDE SERPL-SCNC: 101 MMOL/L (ref 98–107)
CREAT SERPL-MCNC: 0.95 MG/DL (ref 0.67–1.17)
EGFRCR SERPLBLD CKD-EPI 2021: >90 ML/MIN/1.73M2
ERYTHROCYTE [DISTWIDTH] IN BLOOD BY AUTOMATED COUNT: 12.3 % (ref 10–15)
GLUCOSE SERPL-MCNC: 96 MG/DL (ref 70–99)
HCO3 SERPL-SCNC: 25 MMOL/L (ref 22–29)
HCT VFR BLD AUTO: 52.1 % (ref 40–53)
HGB BLD-MCNC: 17.4 G/DL (ref 13.3–17.7)
MCH RBC QN AUTO: 29.8 PG (ref 26.5–33)
MCHC RBC AUTO-ENTMCNC: 33.4 G/DL (ref 31.5–36.5)
MCV RBC AUTO: 89 FL (ref 78–100)
PLATELET # BLD AUTO: 262 10E3/UL (ref 150–450)
POTASSIUM SERPL-SCNC: 4.1 MMOL/L (ref 3.4–5.3)
RBC # BLD AUTO: 5.83 10E6/UL (ref 4.4–5.9)
SODIUM SERPL-SCNC: 139 MMOL/L (ref 135–145)
WBC # BLD AUTO: 10.9 10E3/UL (ref 4–11)

## 2024-11-20 PROCEDURE — 120N000001 HC R&B MED SURG/OB

## 2024-11-20 PROCEDURE — 93454 CORONARY ARTERY ANGIO S&I: CPT | Performed by: INTERNAL MEDICINE

## 2024-11-20 PROCEDURE — B2111ZZ FLUOROSCOPY OF MULTIPLE CORONARY ARTERIES USING LOW OSMOLAR CONTRAST: ICD-10-PCS | Performed by: INTERNAL MEDICINE

## 2024-11-20 PROCEDURE — 250N000009 HC RX 250: Performed by: INTERNAL MEDICINE

## 2024-11-20 PROCEDURE — 258N000003 HC RX IP 258 OP 636: Performed by: INTERNAL MEDICINE

## 2024-11-20 PROCEDURE — 99152 MOD SED SAME PHYS/QHP 5/>YRS: CPT | Performed by: INTERNAL MEDICINE

## 2024-11-20 PROCEDURE — C1769 GUIDE WIRE: HCPCS | Performed by: INTERNAL MEDICINE

## 2024-11-20 PROCEDURE — 250N000011 HC RX IP 250 OP 636: Performed by: INTERNAL MEDICINE

## 2024-11-20 PROCEDURE — 250N000013 HC RX MED GY IP 250 OP 250 PS 637: Performed by: INTERNAL MEDICINE

## 2024-11-20 PROCEDURE — 85027 COMPLETE CBC AUTOMATED: CPT | Performed by: INTERNAL MEDICINE

## 2024-11-20 PROCEDURE — 99233 SBSQ HOSP IP/OBS HIGH 50: CPT | Mod: 25 | Performed by: PHYSICIAN ASSISTANT

## 2024-11-20 PROCEDURE — 93454 CORONARY ARTERY ANGIO S&I: CPT | Mod: 26 | Performed by: INTERNAL MEDICINE

## 2024-11-20 PROCEDURE — 272N000001 HC OR GENERAL SUPPLY STERILE: Performed by: INTERNAL MEDICINE

## 2024-11-20 PROCEDURE — C1894 INTRO/SHEATH, NON-LASER: HCPCS | Performed by: INTERNAL MEDICINE

## 2024-11-20 PROCEDURE — 250N000013 HC RX MED GY IP 250 OP 250 PS 637: Performed by: PHYSICIAN ASSISTANT

## 2024-11-20 PROCEDURE — 84295 ASSAY OF SERUM SODIUM: CPT | Performed by: INTERNAL MEDICINE

## 2024-11-20 PROCEDURE — 36415 COLL VENOUS BLD VENIPUNCTURE: CPT | Performed by: INTERNAL MEDICINE

## 2024-11-20 PROCEDURE — 82310 ASSAY OF CALCIUM: CPT | Performed by: INTERNAL MEDICINE

## 2024-11-20 PROCEDURE — 80048 BASIC METABOLIC PNL TOTAL CA: CPT | Performed by: INTERNAL MEDICINE

## 2024-11-20 PROCEDURE — 99232 SBSQ HOSP IP/OBS MODERATE 35: CPT | Performed by: INTERNAL MEDICINE

## 2024-11-20 PROCEDURE — C1887 CATHETER, GUIDING: HCPCS | Performed by: INTERNAL MEDICINE

## 2024-11-20 RX ORDER — NALOXONE HYDROCHLORIDE 0.4 MG/ML
0.2 INJECTION, SOLUTION INTRAMUSCULAR; INTRAVENOUS; SUBCUTANEOUS
Status: ACTIVE | OUTPATIENT
Start: 2024-11-20 | End: 2024-11-20

## 2024-11-20 RX ORDER — OXYCODONE HYDROCHLORIDE 10 MG/1
10 TABLET ORAL EVERY 4 HOURS PRN
Status: DISCONTINUED | OUTPATIENT
Start: 2024-11-20 | End: 2024-11-21 | Stop reason: HOSPADM

## 2024-11-20 RX ORDER — ACETAMINOPHEN 325 MG/1
650 TABLET ORAL EVERY 4 HOURS PRN
Status: DISCONTINUED | OUTPATIENT
Start: 2024-11-20 | End: 2024-11-21 | Stop reason: HOSPADM

## 2024-11-20 RX ORDER — VERAPAMIL HYDROCHLORIDE 2.5 MG/ML
INJECTION, SOLUTION INTRAVENOUS
Status: DISCONTINUED | OUTPATIENT
Start: 2024-11-20 | End: 2024-11-20 | Stop reason: HOSPADM

## 2024-11-20 RX ORDER — ASPIRIN 325 MG
325 TABLET ORAL ONCE
Status: COMPLETED | OUTPATIENT
Start: 2024-11-20 | End: 2024-11-20

## 2024-11-20 RX ORDER — LORAZEPAM 0.5 MG/1
0.5 TABLET ORAL
Status: DISCONTINUED | OUTPATIENT
Start: 2024-11-20 | End: 2024-11-20 | Stop reason: HOSPADM

## 2024-11-20 RX ORDER — POTASSIUM CHLORIDE 1500 MG/1
20 TABLET, EXTENDED RELEASE ORAL
Status: DISCONTINUED | OUTPATIENT
Start: 2024-11-20 | End: 2024-11-20 | Stop reason: HOSPADM

## 2024-11-20 RX ORDER — LORAZEPAM 2 MG/ML
0.5 INJECTION INTRAMUSCULAR
Status: DISCONTINUED | OUTPATIENT
Start: 2024-11-20 | End: 2024-11-20 | Stop reason: HOSPADM

## 2024-11-20 RX ORDER — FENTANYL CITRATE 50 UG/ML
INJECTION, SOLUTION INTRAMUSCULAR; INTRAVENOUS
Status: DISCONTINUED | OUTPATIENT
Start: 2024-11-20 | End: 2024-11-20 | Stop reason: HOSPADM

## 2024-11-20 RX ORDER — ACETAMINOPHEN 325 MG/1
650 TABLET ORAL EVERY 4 HOURS PRN
Status: DISCONTINUED | OUTPATIENT
Start: 2024-11-20 | End: 2024-11-20

## 2024-11-20 RX ORDER — IOPAMIDOL 755 MG/ML
INJECTION, SOLUTION INTRAVASCULAR
Status: DISCONTINUED | OUTPATIENT
Start: 2024-11-20 | End: 2024-11-20 | Stop reason: HOSPADM

## 2024-11-20 RX ORDER — ASPIRIN 81 MG/1
243 TABLET, CHEWABLE ORAL ONCE
Status: COMPLETED | OUTPATIENT
Start: 2024-11-20 | End: 2024-11-20

## 2024-11-20 RX ORDER — FLUMAZENIL 0.1 MG/ML
0.2 INJECTION, SOLUTION INTRAVENOUS
Status: ACTIVE | OUTPATIENT
Start: 2024-11-20 | End: 2024-11-20

## 2024-11-20 RX ORDER — ACETAMINOPHEN 650 MG/1
650 SUPPOSITORY RECTAL EVERY 4 HOURS PRN
Status: DISCONTINUED | OUTPATIENT
Start: 2024-11-20 | End: 2024-11-21 | Stop reason: HOSPADM

## 2024-11-20 RX ORDER — LIDOCAINE 40 MG/G
CREAM TOPICAL
Status: DISCONTINUED | OUTPATIENT
Start: 2024-11-20 | End: 2024-11-20

## 2024-11-20 RX ORDER — HEPARIN SODIUM 1000 [USP'U]/ML
INJECTION, SOLUTION INTRAVENOUS; SUBCUTANEOUS
Status: DISCONTINUED | OUTPATIENT
Start: 2024-11-20 | End: 2024-11-20 | Stop reason: HOSPADM

## 2024-11-20 RX ORDER — FENTANYL CITRATE 50 UG/ML
25 INJECTION, SOLUTION INTRAMUSCULAR; INTRAVENOUS
Status: DISCONTINUED | OUTPATIENT
Start: 2024-11-20 | End: 2024-11-21 | Stop reason: HOSPADM

## 2024-11-20 RX ORDER — NALOXONE HYDROCHLORIDE 0.4 MG/ML
0.4 INJECTION, SOLUTION INTRAMUSCULAR; INTRAVENOUS; SUBCUTANEOUS
Status: ACTIVE | OUTPATIENT
Start: 2024-11-20 | End: 2024-11-20

## 2024-11-20 RX ORDER — OXYCODONE HYDROCHLORIDE 5 MG/1
5 TABLET ORAL EVERY 4 HOURS PRN
Status: DISCONTINUED | OUTPATIENT
Start: 2024-11-20 | End: 2024-11-21 | Stop reason: HOSPADM

## 2024-11-20 RX ORDER — NITROGLYCERIN 5 MG/ML
VIAL (ML) INTRAVENOUS
Status: DISCONTINUED | OUTPATIENT
Start: 2024-11-20 | End: 2024-11-20 | Stop reason: HOSPADM

## 2024-11-20 RX ORDER — FUROSEMIDE 20 MG/1
20 TABLET ORAL DAILY
Status: DISCONTINUED | OUTPATIENT
Start: 2024-11-21 | End: 2024-11-20

## 2024-11-20 RX ORDER — ATROPINE SULFATE 0.1 MG/ML
0.5 INJECTION INTRAVENOUS
Status: ACTIVE | OUTPATIENT
Start: 2024-11-20 | End: 2024-11-20

## 2024-11-20 RX ORDER — FUROSEMIDE 20 MG/1
10 TABLET ORAL DAILY
Status: DISCONTINUED | OUTPATIENT
Start: 2024-11-21 | End: 2024-11-21 | Stop reason: HOSPADM

## 2024-11-20 RX ADMIN — BUSPIRONE HYDROCHLORIDE 5 MG: 5 TABLET ORAL at 09:18

## 2024-11-20 RX ADMIN — ROSUVASTATIN CALCIUM 20 MG: 20 TABLET, FILM COATED ORAL at 22:25

## 2024-11-20 RX ADMIN — BUSPIRONE HYDROCHLORIDE 10 MG: 10 TABLET ORAL at 22:24

## 2024-11-20 RX ADMIN — APIXABAN 5 MG: 5 TABLET, FILM COATED ORAL at 22:24

## 2024-11-20 RX ADMIN — METOPROLOL SUCCINATE 25 MG: 25 TABLET, EXTENDED RELEASE ORAL at 10:54

## 2024-11-20 RX ADMIN — SODIUM CHLORIDE 1000 ML: 9 INJECTION, SOLUTION INTRAVENOUS at 15:56

## 2024-11-20 RX ADMIN — LEVETIRACETAM 1000 MG: 500 TABLET, FILM COATED ORAL at 22:24

## 2024-11-20 RX ADMIN — ASPIRIN 325 MG ORAL TABLET 325 MG: 325 PILL ORAL at 09:17

## 2024-11-20 RX ADMIN — LEVETIRACETAM 500 MG: 500 TABLET, FILM COATED ORAL at 09:18

## 2024-11-20 RX ADMIN — ACETAMINOPHEN 650 MG: 325 TABLET, FILM COATED ORAL at 09:33

## 2024-11-20 RX ADMIN — NORTRIPTYLINE HYDROCHLORIDE 75 MG: 25 CAPSULE ORAL at 22:30

## 2024-11-20 NOTE — PROGRESS NOTES
[facility]  Cardiology Progress Note    Outpatient cardiologist: Dr. Saba    Date of Service (when I saw the patient): 11/20/2024      Summary: Clement Robert is a 59 year old male with history of seizure disorder, chronic left bundle branch block, paroxysmal atrial fibrillation, who was admitted on 11/18/2024 with progressive dyspnea on exertion.     Patient has a remote history of atrial fibrillation, had been seen by colleagues Dr. Saba and Kerry Barcenas in 2011.  At that time he had recently been diagnosed with atrial fibrillation, underwent cardioversion.  Coronary CTA 11/2011 calcium score 0, some mild soft plaque in the left circumflex artery.  Stress echocardiogram 2009 negative, notably LVEF 55-60% and normal LV size.  [id number] was 0, and so was on aspirin monotherapy at that time.  Started on simvastatin as well.      Since then Lexiscan stress test 3/16/2020 was negative for inducible myocardial ischemia or infarction.  He had a left bundle branch block at that time.     With that background in mind, patient presented to the ED 11/18/2024 with dyspnea over the preceding 2 weeks.  He had also noticed some intermittent tachycardia.  No chest pain, no abnormal lower extremity swelling.  He does note symptoms of orthopnea/PND a couple of days ago.  In the ED initial blood pressure was 118/93 mmHg.  ECG demonstrated borderline sinus tachycardia at approximately 100 bpm, left bundle branch block, QTc 538 ms.  Initial labs in the ED notable for NT proBNP 4800, high-sensitivity troponin normal, TSH normal, electrolytes normal, CBC normal.  .  He was admitted to the Internal Medicine service.  He was started on diuretics.  TTE 11/19/2024 demonstrated LVEF 22% with severe global hypokinesis, severe LV dilatation LVIDD 7.7 cm, normal RV function, mild MR.     He does note that his wife had URI symptoms a few weeks ago, and he has had some coughing a couple of weeks ago,  "also received the COVID/flu vaccine combination a couple of weeks ago.     Family history notable for father dying from \"heart attack in his 40s\", details unclear.    Interval History   Tele: Sinus with BBB, PVCs up to couplets, and one run of 4 beat NSVT this AM around 11:20    He is feeling ok now.   No chest pain.   Breathing is ok.          Assessment & Plan   # Acute HFrEF LVEF 20-25%, new dx, chronicity unknown.     # Dilated cardiomyopathy  -  TTE 11/19/2024 demonstrated LVEF 22% with severe global hypokinesis, severe LV dilatation LVIDD 7.7 cm, normal RV function, mild MR.  - Etiology: unknown.  May be ischemic (no recent sxs of ischemic event), related to LBBB. Unlikely myocarditis given normal trop. May be genetic given  Father passed away from \"heart attack\" in his 40s, details unclear. No significant alcohol use. No illicit drug use. TSH WNL.   - Cardiac cath, possible intervention today   * R and B discussed yesterday by Dr. Richardson    * He is NPO  - If no sig CAD, then plan outpatient CMRI  - GDMT:   * BB: Toprol XL 25   * Entresto 24-26 mg BID started   * SGLT2i: will likely start this later today or tomorrow   * MRA. Will plan on low-dose aldosterone antagonist in the outpatient setting  - Volume:   * Appears euvolemic   - With chronic LBBB, will need to consider BiV device  - With his ectopy, family history of sudden death, and his EF 20%, will proceed with LifeVest. Order page completed and  will fax.   - Will place CORE consult        # Remote history of paroxysmal atrial fibrillation   - [id number] at least 1, discussed risks/benefits, after discussion patient would like to start anticoagulation.  No hx of abnormal bleeding.  Will plan on apixaban after cardiac catheterization  - Toprol XL 25 mg      # Chronic LBBB, would need to consider CRT-D if appropriate             Edith Aguillon PA-C      Patient Active Problem List   Diagnosis    Seizures (H)    Anxiety    Epilepsy without status " epilepticus, not intractable, unspecified    Acute pharyngitis, unspecified etiology    LBBB (left bundle branch block)    Acute congestive heart failure, unspecified heart failure type (H)       Physical Exam   Temp: 98.6  F (37  C) Temp src: Oral BP: 98/73 Pulse: 75   Resp: 17 SpO2: 97 % O2 Device: None (Room air)    Vitals:    11/18/24 1934 11/19/24 0544 11/20/24 0703   Weight: 89.3 kg (196 lb 14.4 oz) 86.6 kg (190 lb 14.4 oz) 85.8 kg (189 lb 1.6 oz)     Vital Signs with Ranges  Temp:  [97.7  F (36.5  C)-98.6  F (37  C)] 98.6  F (37  C)  Pulse:  [70-95] 75  Resp:  [16-18] 17  BP: ()/(64-88) 98/73  SpO2:  [93 %-97 %] 97 %  I/O last 3 completed shifts:  In: 679 [P.O.:670; I.V.:9]  Out: 2900 [Urine:2900]    Constitutional: NAD.   Respiratory: CTAB.   Cardiovascular: RRR, s1s2, no sig murmur  GI: soft, BS+  Skin: warm, no rashes  Musculoskeletal: Moving all extremities  Neurologic: Alert, oriented x 3  Neuropsychiatric: Normal affect       Data   Recent Labs   Lab 11/20/24  0850 11/20/24  0642 11/19/24  0642 11/18/24  1441   WBC 10.9  --   --  8.6   HGB 17.4  --   --  15.4   MCV 89  --   --  88     --   --  236   INR  --   --   --  0.99   NA  --  139 139 138   POTASSIUM  --  4.1 4.2 4.6   CHLORIDE  --  101 103 101   CO2  --  25 21* 24   BUN  --  25.8* 18.8 17.0   CR  --  0.95 0.83 1.05   ANIONGAP  --  13 15 13   JAMES  --  9.2 9.1 9.3   GLC  --  96 127* 91       No results found for this or any previous visit (from the past 24 hours).    Medications   Current Facility-Administered Medications   Medication Dose Route Frequency Provider Last Rate Last Admin     Current Facility-Administered Medications   Medication Dose Route Frequency Provider Last Rate Last Admin    aspirin EC tablet 81 mg  81 mg Oral Daily Chaparro Richardson MD   81 mg at 11/19/24 1342    busPIRone (BUSPAR) tablet 10 mg  10 mg Oral At Bedtime Sarahy Montano PA-C   10 mg at 11/19/24 2156    busPIRone (BUSPAR) tablet 5 mg  5 mg Oral [facility]  Sarahy Montano PA-C   5 mg at 11/20/24 0918    [START ON 11/21/2024] furosemide (LASIX) tablet 20 mg  20 mg Oral Daily Chaparro Richardson MD        levETIRAcetam (KEPPRA) tablet 1,000 mg  1,000 mg Oral At Bedtime Sarahy Montano PA-C   1,000 mg at 11/19/24 2156    levETIRAcetam (KEPPRA) tablet 500 mg  500 mg Oral QAM Sarahy Montano PA-C   500 mg at 11/20/24 0918    losartan (COZAAR) half-tab 12.5 mg  12.5 mg Oral Daily Chaparro Richardson MD        metoprolol succinate ER (TOPROL XL) 24 hr tablet 25 mg  25 mg Oral Daily Chaparro Richardson MD   25 mg at 11/20/24 1054    nortriptyline (PAMELOR) capsule 75 mg  75 mg Oral At Bedtime Sarahy Montano PA-C   75 mg at 11/19/24 2156    rosuvastatin (CRESTOR) tablet 20 mg  20 mg Oral At Bedtime Chaparro Richardson MD   20 mg at 11/19/24 2156    sodium chloride (PF) 0.9% PF flush 3 mL  3 mL Intracatheter Q8H Chaparro Richardson MD   3 mL at 11/20/24 0935    sodium chloride (PF) 0.9% PF flush 3 mL  3 mL Intracatheter Q8H Sarahy Montano PA-C   3 mL at 11/20/24 0640

## 2024-11-20 NOTE — TELEPHONE ENCOUNTER
[facility]: Heart Failure Care Coordination   Heart Failure Education    Situation/Background:      RN CC provided heart failure education to Pt during admission.    Assessment:      Living situation: home with family    Barriers to Heart Failure follow-up:  None noted    Medication management: independent      Intervention/Plan:      CM/HF education topics reviewed:  Low sodium: 2000 mg or less daily, meal choices and label reading   Fluid Restriction: 2 L/Day   Daily weight monitoring and logging   Medication review and importance of compliance   Home blood pressure monitoring and logging   Overview of C.O.R.E. clinic   Overview of heart failure appointments and testing   Importance of exercise   Importance of Cardiac Rehab   Symptoms of HF to be reported to Core Team      Education materials provided:  Low sodium food and drink handout  Low sodium food product examples  Already prepared low salt meal delivery services handout  HF stoplight tool  Guide to HF booklet  Cardiac medication handout  C.O.R.E. information brochure     HF resources reviewed:  Heart Failure support group      Patient to call/[employer] message with updates.  Patient to follow up as scheduled.  RN CC reviewed and reinforced fluid/dietary sodium restrictions; patient stated understanding.  Instructed patient to call RN line with new or worsening heart failure symptoms and/or rapid weight gain.  Confirmed patient has clinic and scheduling phone numbers.     Patient expressed understanding of above education/instructions and denied further questions at this time.    Future Appointments   Date Time Provider Department Center   11/21/2024  1:30 PM Cinda Jimenez OTR [facility] [place] [facility]   11/25/2024  8:15 AM RU LAB [facility] [place] [facility]   11/25/2024  9:20 AM [facility]   12/3/2024  3:30 PM Mariela Miramontes APRN CNP [facility]           JEREMIAH Rios, RN 5:16 PM 11/20/24

## 2024-11-20 NOTE — DISCHARGE INSTRUCTIONS
Please call [facility] (Heart Clinic) with any concerns:  Monday-Friday 8:00 AM to 4:30 PM   General Line: [phone] or  [place] Nurse Line: [phone]  After hours:  [phone]    -- Please start weighing self daily and write in wt log chart to bring into your cardiology/CORE clinic follow up appts.   -- Please bring in current medication bottles to cardiology/CORE appts for review.   -- Call RN with weight gain greater than 2 lbs overnight, and/or 5 lbs in a week, and/or worsening shortness of breath/edema/chest pain/palpitations       Your labs are NOT FASTING on 11/25/2024 at 8:15 am.  OK to eat.   Please bring weight record to appointment.

## 2024-11-20 NOTE — Clinical Note
CORE enrollment. 11/25 Education done at bedside Meet with pt to review HF s/s, weights and diet.  Give CORE intro letter   Tracker set to 11/25

## 2024-11-20 NOTE — PROGRESS NOTES
[facility]- C.O.R.E. Clinic    Received CORE Clinic Consult from Dr Richardson on 11/19/24 and ELIEZER Butler in 11/20/24.    Reviewed Harshil's chart and note they are admitted on 11/18/2024 for new HFrEF and LBBB. Echocardiogram on 11/19/2024 shows LVEF 22%.  This is their first admission(s) in the past year for concerns of heart failure.    Given above information, Harshil meets criteria for CORE Clinic as patients EF is =/<40%.     Met with Harshil at bedside to discuss CORE Clinic consult request.  Patient/family confirm they plan to enroll in CORE Clinic.    Patient's primary insurance:  [employer]    Pt reports the following HF symptoms prior to admission:  Increased shortness of breath and palpitations     Living situation:  own home with wife    Med set up:  Self. Plans on getting a pill box    Scale available at home:  Yes    Barriers to HF follow-up:  None    Medication review: Patient is not on both Entresto and a SGLT2 Inhibitor (Jardiance, Farxiga, Invokana), Pharmacy Liaison Consult placed for coverage review.   *Coverage check completed 11/19/2024    CM/HF education topics we reviewed:  Low sodium  Fluid Restriction: 2L/Day  Daily weights  Symptoms of HF to be reported to CORE Team.  Medications  Cardiac Rehab    Education materials provided:    Low sodium food and drink handout  Low sodium food product examples  Already prepared low salt meal delivery services  HF stoplight tool  Guide to HF booklet      I have arranged a lab appointment and CORE visit with ELIEZER Keller on 11/25/2024, see below.       Instructed Harshil to call [facility] CORE Team at [phone] with questions/concerns prior to this visit.  Specifically instructed them to call RN with weight gain greater than 2 lbs overnight, and/or 5 lbs in a week, and/or worsening SOB/edema/chest pain/palpitations.     Future Appointments   Date Time Provider Department Center   11/25/2024  8:15 AM RU LAB [facility] [place] [facility]   11/25/2024  9:20 AM De  ANNA Larkin [facility] PSA CLIN       Harshil voices understanding and denies further questions or concerns at this time.      Please call with any further questions.    JEREMIAH Rios RN  [place]  C.O.R.E. Clinic Care Coordinator  [phone]

## 2024-11-20 NOTE — PROGRESS NOTES
Coronary angiogram performed without complication.  Pt tolerated procedure well.  TR band in place on right radial access site.  Report given to receiving nurse; TR band and access site assessed together at bedside.

## 2024-11-20 NOTE — PROGRESS NOTES
Patient was seen and examined by me this morning.  He is doing well.  No shortness of breath or chest pain.  Patient was seen by cardiology team and plan noted for coronary angiogram today.  His blood pressure has been soft and getting beta-blocker per cardiology.  Will await coronary angiogram report for further care plan.  Care plan discussed with bedside nurse and cardiology team.

## 2024-11-20 NOTE — PLAN OF CARE
"Care from 19:00-23:00  Inpatient Progress Note - MS3  For vital signs and complete assessments, please see documentation flowsheets.     ORIENTATION: Aox4.  VSS.  PAIN: Denies pain, CP, SOB, n/v.  O2: RA  TELE: SR w/ BBB  GI/: Continent, independent.  ACTIVITY: Independent, steady.  DIET: Low sat. fat; plan for NPO at midnight.  LINES/DRAINS: R PIV SL  PROTOCOLS: K recheck in for AM.  PLAN: Cardiology following; plan for angiogram tomorrow 11/20.    Goal Outcome Evaluation:      Plan of Care Reviewed With: patient    Overall Patient Progress: no changeOverall Patient Progress: no change    Outcome Evaluation: Denies pain, CP, SOB, heart palpitations. Remains RA, SR w/ BBB; Anxious at times, reassurance provided. NPO at midnight for angiogram tomorrow 11/20.    Problem: Adult Inpatient Plan of Care  Goal: Plan of Care Review  Description: The Plan of Care Review/Shift note should be completed every shift.  The Outcome Evaluation is a brief statement about your assessment that the patient is improving, declining, or no change.  This information will be displayed automatically on your shift  note.  11/19/2024 2314 by Jackelin Chawla RN  Outcome: Progressing  Flowsheets (Taken 11/19/2024 2314)  Outcome Evaluation:   Denies pain, CP, SOB, heart palpitations. Remains RA, SR w/ BBB   Anxious at times, reassurance provided. NPO at midnight for angiogram tomorrow 11/20.  Plan of Care Reviewed With: patient  Overall Patient Progress: no change  11/19/2024 2311 by Jackelin Chawla RN  Outcome: Progressing  Flowsheets (Taken 11/19/2024 2311)  Outcome Evaluation:   Denies pain, CP, SOB, heart palpitations. Remains RA, SR w/ BBB   Anxious at times, reassurance provided. NPO at midnight for angiogram tomorrow 11/20.  Overall Patient Progress: no change  Goal: Patient-Specific Goal (Individualized)  Description: You can add care plan individualizations to a care plan. Examples of Individualization might be:  \"Parent requests " "to be called daily at 9am for status\", \"I have a hard time hearing out of my right ear\", or \"Do not touch me to wake me up as it startles  me\".  11/19/2024 2314 by Jackelin Chawla RN  Outcome: Progressing  11/19/2024 2311 by Jackelin Chawla RN  Outcome: Progressing  Goal: Absence of Hospital-Acquired Illness or Injury  11/19/2024 2314 by Jackelin Chawla RN  Outcome: Progressing  11/19/2024 2311 by Jackelin Chawla RN  Outcome: Progressing  Intervention: Identify and Manage Fall Risk  Recent Flowsheet Documentation  Taken 11/19/2024 2150 by Jackelin Chawla RN  Safety Promotion/Fall Prevention:   safety round/check completed   supervised activity   room organization consistent   room near nurse's station   room door open   patient and family education   nonskid shoes/slippers when out of bed   mobility aid in reach   lighting adjusted   increase visualization of patient   increased rounding and observation   clutter free environment maintained   assistive device/personal items within reach   activity supervised  Intervention: Prevent Skin Injury  Recent Flowsheet Documentation  Taken 11/19/2024 2150 by Jackelin Chawla RN  Body Position: position changed independently  Skin Protection: adhesive use limited  Intervention: Prevent and Manage VTE (Venous Thromboembolism) Risk  Recent Flowsheet Documentation  Taken 11/19/2024 2150 by Jackelin Chawla RN  VTE Prevention/Management: (Ambulatory) SCDs off (sequential compression devices)  Intervention: Prevent Infection  Recent Flowsheet Documentation  Taken 11/19/2024 2150 by Jackelin Chawla RN  Infection Prevention:   single patient room provided   rest/sleep promoted   hand hygiene promoted  Goal: Optimal Comfort and Wellbeing  11/19/2024 2314 by Jackelin Chawla RN  Outcome: Progressing  11/19/2024 2311 by Jackelin Chawla RN  Outcome: Progressing  Goal: Readiness for Transition of Care  11/19/2024 2314 by Jackelin Chawla RN  Outcome: " Progressing  11/19/2024 2311 by Jackelin Chawla, RN  Outcome: Progressing

## 2024-11-20 NOTE — PROCEDURES
"[facility]    Procedure: Coronary angiogram    Date/Time: 11/20/2024 1:35 PM    Performed by: Dominick Das MD  Authorized by: Dominick Das MD      UNIVERSAL PROTOCOL   Site Marked: Yes  Prior Images Obtained and Reviewed:  Yes  Required items: Required blood products, implants, devices and special equipment available    Patient identity confirmed:  Verbally with patient  Patient was reevaluated immediately before administering moderate or deep sedation or anesthesia  Confirmation Checklist:  Patient's identity using two indicators  Time out: Immediately prior to the procedure a time out was called    Universal Protocol: the Joint Commission Universal Protocol was followed    Preparation: Patient was prepped and draped in usual sterile fashion       ANESTHESIA    Local Anesthetic:  Lidocaine 1% without epinephrine      SEDATION  Patient Sedated: Yes    Sedation:  Fentanyl and midazolam  Vital signs: Vital signs monitored during sedation      PROCEDURE  Describe Procedure: Procedure  1) CAG  Approach RTR  Indication acute systolic heart failure history of LBBB atrial fibrillation  Complications none  Findings  Mild generalized atheromatous change with no focal stenosis RCA dominant    ASSESS  \"NONISCHEMIC\" Cardiomyopathy    Recommendations  1) GDMT for HRFREF  2) statin therapy    Pippa  Patient Tolerance:  Patient tolerated the procedure well with no immediate complications  Length of time physician/provider present for 1:1 monitoring during sedation: 20      "
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
## 2024-11-20 NOTE — PLAN OF CARE
"Goal Outcome Evaluation:      Plan of Care Reviewed With: patient    Overall Patient Progress: no change    Outcome Evaluation: Denies SOB and heart palpitations. Anxious at times. Did complain of some discomfort in lower esophagus that it feels like food gets trapped. Plan for angiogram tomorrow.    Patient calm and cooperative, intermittently anxious. Questions answered and patient is easily calmed down. VS stable, maintaining O2 sats >90% on RA. Denies SOB. Tele SR. Denies chest pain. Lung sounds are clear. Plan for angiogram tomorrow. Did receive shower. Independent in room. Patient would like to speak with provider regarding a feeling in his lower esophagus that is what sounds like GERD.       Problem: Adult Inpatient Plan of Care  Goal: Plan of Care Review  Description: The Plan of Care Review/Shift note should be completed every shift.  The Outcome Evaluation is a brief statement about your assessment that the patient is improving, declining, or no change.  This information will be displayed automatically on your shift  note.  Outcome: Progressing  Flowsheets (Taken 11/19/2024 2002)  Outcome Evaluation: Denies SOB and heart palpitations. Anxious at times. Did complain of some discomfort in lower esophagus that it feels like food gets trapped. Plan for angiogram tomorrow.  Plan of Care Reviewed With: patient  Overall Patient Progress: no change  Goal: Patient-Specific Goal (Individualized)  Description: You can add care plan individualizations to a care plan. Examples of Individualization might be:  \"Parent requests to be called daily at 9am for status\", \"I have a hard time hearing out of my right ear\", or \"Do not touch me to wake me up as it startles  me\".  Outcome: Progressing  Goal: Absence of Hospital-Acquired Illness or Injury  Outcome: Progressing  Intervention: Identify and Manage Fall Risk  Recent Flowsheet Documentation  Taken 11/19/2024 1738 by Abby Munson, RN  Safety Promotion/Fall " Prevention: safety round/check completed  Taken 11/19/2024 1545 by Abby Munson RN  Safety Promotion/Fall Prevention: safety round/check completed  Intervention: Prevent Skin Injury  Recent Flowsheet Documentation  Taken 11/19/2024 1738 by Abby Munson RN  Body Position: position changed independently  Skin Protection:   adhesive use limited   incontinence pads utilized   pulse oximeter probe site changed   transparent dressing maintained  Taken 11/19/2024 1545 by Abby Munson RN  Body Position: position changed independently  Intervention: Prevent and Manage VTE (Venous Thromboembolism) Risk  Recent Flowsheet Documentation  Taken 11/19/2024 1738 by Abby Munson RN  VTE Prevention/Management: (Independent and steady) SCDs off (sequential compression devices)  Intervention: Prevent Infection  Recent Flowsheet Documentation  Taken 11/19/2024 1738 by Abby Munson RN  Infection Prevention:   equipment surfaces disinfected   hand hygiene promoted   rest/sleep promoted   single patient room provided  Taken 11/19/2024 1545 by Abby Munson RN  Infection Prevention:   hand hygiene promoted   personal protective equipment utilized   single patient room provided   visitors restricted/screened  Goal: Optimal Comfort and Wellbeing  Outcome: Progressing  Goal: Readiness for Transition of Care  Outcome: Progressing     Problem: Dysrhythmia  Goal: Normalized Cardiac Rhythm  Outcome: Progressing  Intervention: Monitor and Manage Cardiac Rhythm Effect  Recent Flowsheet Documentation  Taken 11/19/2024 1738 by Abby Munson RN  VTE Prevention/Management: (Independent and steady) SCDs off (sequential compression devices)     Problem: Gas Exchange Impaired  Goal: Optimal Gas Exchange  Outcome: Progressing

## 2024-11-20 NOTE — PRE-PROCEDURE
GENERAL PRE-PROCEDURE:   Procedure:  Coronary angiogram  Date/Time:  11/20/2024 1:05 PM    Verbal consent obtained?: Yes    Written consent obtained?: Yes    Risks and benefits: Risks, benefits and alternatives were discussed    DC Plan: Appropriate discharge home plan in place for patients who are going home after procedure   Consent given by:  Patient  Patient states understanding of procedure being performed: Yes    Patient's understanding of procedure matches consent: Yes    Procedure consent matches procedure scheduled: Yes    Expected level of sedation:  Moderate  Appropriately NPO:  Yes  ASA Class:  2  Lungs:  Lungs clear with good breath sounds bilaterally  Heart:  Normal heart sounds and rate  History & Physical reviewed:  History and physical reviewed and no updates needed  Statement of review:  I have reviewed the lab findings, diagnostic data, medications, and the plan for sedation      The patient has newly diagnosed acute systolic heart failure and CAG requested/. . I have reviewed the risk of death, MI, stroke, bleed peripheral vascular complications and hematoma. He wishes to proceed.

## 2024-11-20 NOTE — PLAN OF CARE
Goal Outcome Evaluation:      Plan of Care Reviewed With: patient          Outcome Evaluation: ORIENTATION: Aox4.  VSS.  PAIN: Denies pain, CP, SOB, n/v.  O2: RA  TELE: SR w/ BBB  GI/: Continent, independent.  ACTIVITY: Independent, steady.  DIET: NPO for angiogram.  LINES/DRAINS: R PIV SL  PROTOCOLS: K, Mag rechecks this  AM.  PLAN: Cardiology following; plan for angiogram today.

## 2024-11-21 ENCOUNTER — APPOINTMENT (OUTPATIENT)
Dept: OCCUPATIONAL THERAPY | Facility: CLINIC | Age: 59
DRG: 287 | End: 2024-11-21
Attending: INTERNAL MEDICINE
Payer: COMMERCIAL

## 2024-11-21 ENCOUNTER — MEDICAL CORRESPONDENCE (OUTPATIENT)
Dept: HEALTH INFORMATION MANAGEMENT | Facility: CLINIC | Age: 59
End: 2024-11-21

## 2024-11-21 VITALS
RESPIRATION RATE: 14 BRPM | WEIGHT: 191.1 LBS | DIASTOLIC BLOOD PRESSURE: 76 MMHG | HEIGHT: 70 IN | OXYGEN SATURATION: 95 % | TEMPERATURE: 98 F | HEART RATE: 87 BPM | BODY MASS INDEX: 27.36 KG/M2 | SYSTOLIC BLOOD PRESSURE: 106 MMHG

## 2024-11-21 LAB
ANION GAP SERPL CALCULATED.3IONS-SCNC: 12 MMOL/L (ref 7–15)
BACTERIA BLD CULT: NORMAL
BUN SERPL-MCNC: 21.1 MG/DL (ref 6–20)
CALCIUM SERPL-MCNC: 8.8 MG/DL (ref 8.8–10.4)
CHLORIDE SERPL-SCNC: 103 MMOL/L (ref 98–107)
CREAT SERPL-MCNC: 1.05 MG/DL (ref 0.67–1.17)
EGFRCR SERPLBLD CKD-EPI 2021: 82 ML/MIN/1.73M2
GLUCOSE SERPL-MCNC: 94 MG/DL (ref 70–99)
HCO3 SERPL-SCNC: 25 MMOL/L (ref 22–29)
MAGNESIUM SERPL-MCNC: 2.3 MG/DL (ref 1.7–2.3)
PHOSPHATE SERPL-MCNC: 3.5 MG/DL (ref 2.5–4.5)
POTASSIUM SERPL-SCNC: 4.3 MMOL/L (ref 3.4–5.3)
POTASSIUM SERPL-SCNC: 4.3 MMOL/L (ref 3.4–5.3)
SODIUM SERPL-SCNC: 140 MMOL/L (ref 135–145)

## 2024-11-21 PROCEDURE — 250N000013 HC RX MED GY IP 250 OP 250 PS 637: Performed by: INTERNAL MEDICINE

## 2024-11-21 PROCEDURE — 36415 COLL VENOUS BLD VENIPUNCTURE: CPT | Performed by: INTERNAL MEDICINE

## 2024-11-21 PROCEDURE — 84100 ASSAY OF PHOSPHORUS: CPT | Performed by: INTERNAL MEDICINE

## 2024-11-21 PROCEDURE — 97110 THERAPEUTIC EXERCISES: CPT | Mod: GO

## 2024-11-21 PROCEDURE — 99239 HOSP IP/OBS DSCHRG MGMT >30: CPT | Performed by: INTERNAL MEDICINE

## 2024-11-21 PROCEDURE — 84132 ASSAY OF SERUM POTASSIUM: CPT | Performed by: INTERNAL MEDICINE

## 2024-11-21 PROCEDURE — 82565 ASSAY OF CREATININE: CPT | Performed by: INTERNAL MEDICINE

## 2024-11-21 PROCEDURE — 97535 SELF CARE MNGMENT TRAINING: CPT | Mod: GO

## 2024-11-21 PROCEDURE — 80048 BASIC METABOLIC PNL TOTAL CA: CPT | Performed by: INTERNAL MEDICINE

## 2024-11-21 PROCEDURE — 83735 ASSAY OF MAGNESIUM: CPT | Performed by: INTERNAL MEDICINE

## 2024-11-21 PROCEDURE — 97165 OT EVAL LOW COMPLEX 30 MIN: CPT | Mod: GO

## 2024-11-21 PROCEDURE — 99233 SBSQ HOSP IP/OBS HIGH 50: CPT | Mod: FS | Performed by: PHYSICIAN ASSISTANT

## 2024-11-21 RX ORDER — NALOXONE HYDROCHLORIDE 0.4 MG/ML
0.4 INJECTION, SOLUTION INTRAMUSCULAR; INTRAVENOUS; SUBCUTANEOUS
Status: DISCONTINUED | OUTPATIENT
Start: 2024-11-21 | End: 2024-11-21 | Stop reason: HOSPADM

## 2024-11-21 RX ORDER — LOSARTAN POTASSIUM 25 MG/1
12.5 TABLET ORAL DAILY
Qty: 15 TABLET | Refills: 0 | Status: SHIPPED | OUTPATIENT
Start: 2024-11-22 | End: 2024-12-22

## 2024-11-21 RX ORDER — METOPROLOL SUCCINATE 25 MG/1
25 TABLET, EXTENDED RELEASE ORAL DAILY
Qty: 30 TABLET | Refills: 0 | Status: SHIPPED | OUTPATIENT
Start: 2024-11-21

## 2024-11-21 RX ORDER — ROSUVASTATIN CALCIUM 20 MG/1
20 TABLET, COATED ORAL AT BEDTIME
Qty: 30 TABLET | Refills: 0 | Status: SHIPPED | OUTPATIENT
Start: 2024-11-21

## 2024-11-21 RX ORDER — NALOXONE HYDROCHLORIDE 0.4 MG/ML
0.2 INJECTION, SOLUTION INTRAMUSCULAR; INTRAVENOUS; SUBCUTANEOUS
Status: DISCONTINUED | OUTPATIENT
Start: 2024-11-21 | End: 2024-11-21 | Stop reason: HOSPADM

## 2024-11-21 RX ADMIN — LEVETIRACETAM 500 MG: 500 TABLET, FILM COATED ORAL at 09:02

## 2024-11-21 RX ADMIN — LOSARTAN POTASSIUM 12.5 MG: 25 TABLET, FILM COATED ORAL at 09:02

## 2024-11-21 RX ADMIN — METOPROLOL SUCCINATE 25 MG: 25 TABLET, EXTENDED RELEASE ORAL at 09:02

## 2024-11-21 RX ADMIN — BUSPIRONE HYDROCHLORIDE 5 MG: 5 TABLET ORAL at 09:02

## 2024-11-21 RX ADMIN — EMPAGLIFLOZIN 10 MG: 10 TABLET, FILM COATED ORAL at 09:02

## 2024-11-21 RX ADMIN — APIXABAN 5 MG: 5 TABLET, FILM COATED ORAL at 09:04

## 2024-11-21 ASSESSMENT — ACTIVITIES OF DAILY LIVING (ADL)
ADLS_ACUITY_SCORE: 0
IADL_COMMENTS: INDP
ADLS_ACUITY_SCORE: 0
ADLS_ACUITY_SCORE: 0

## 2024-11-21 NOTE — PROGRESS NOTES
[facility]    Hospitalist Progress Note  Name: Clement Robert    MRN: [id number]  Provider:  Damon Cabello DO  Date of Service: 11/21/2024    Summary of Stay: Clement Robert is a 59 year old male with a history of atrial fibrillation status post cardioversion after being hit by a hockey puck in 2011, seizure disorder, migraines, anxiety admitted on 11/18/2024 with dyspnea on exertion.  In the emergency department, the patient is found have a temperature of 98.3  F, heart rate 95, respiratory rate 24, blood pressure 118/93, SpO2 99% on room air.  Initial lab work showed proBNP 4808, high-sensitivity troponin 21, remaining CBC and BMP within normal limits.  EKG showed sinus rhythm with possible left atrial enlargement and left bundle branch block.  Chest x-ray showed cardiac enlargement with bilateral pulmonary venous congestion and small right pleural effusion.  Bilateral lower extremity venous duplex Doppler were negative for DVT.  The patient was started on IV Lasix.  Echocardiogram showed ejection fraction 22% with severe global hypokinesia of the left ventricle and moderately dilated right atrium and mild mitral valve regurgitation.  Cardiology was consulted to see the patient.  Patient was started on Toprol, losartan, rosuvastatin, and Jardiance.  Coronary angiogram on 11/20/2024 showed mild atheromatous changes and EF of 25%.  The patient was fitted for a LifeVest prior to discharge and seen by cardiac rehab.      TODAY'S PLAN:  Appreciate Cardiology recommendations.  Angiogram yesterday with minimal disease.  Dyspnea is quite improved from arrival.  Start Jardiance this morning.  Blood pressures have been soft but patient seems to be tolerating low-dose losartan.  Defer to cardiology benefit of switching to Entresto prior to discharge.  Plan for introduction of MRI as an outpatient.  Patient to be fitted for LifeVest this morning.  Anticipate discharge home later today once cleared by  cardiology.  Discussed dietary modifications and recommended a Mediterranean diet.  Cardiac rehab as well.  Will fill meds prior to discharge.  Plan for discharge home today.  Anticipate cardiac MRI as an outpatient.    Problem List:   New HFrEF - EF 22%  - Appreciate Cardiology recommendations  - Echo showed EF 22%, severe global hypokinesia of LV, moderately dilated RA, mild MR  - s/p coronary angiogram on 11/20/2024 showing mild atheromatous changes, EF 25%  - Start metoprolol, losartan, rosuvastatin - consider transition to Entresto  - Start jardiance  - Lifevest prior to discharge  - Cardiac Rehab  - Telemetry  - Consider addition of MRA/Spironolactone as outpatient    Hx of Atrial Fibrillation s/p Cardioversion  - Hit by mariel lara in 2011 and found to have afib and cardioverted in the ED.    - Started on eliquis per Cardiology    Chronic Medical Problems:  Seizure Disorder  Migraines  Anxiety    I spent 48 minutes in reviewing this patient's labs, imaging, medications, medical history.  In addition time was spent interviewing the patient, communicating with family, and medical decision making.      DVT Prophylaxis: DOAC  Code Status: Full Code  Diet: Low Saturated Fat Na <2400 mg    Frederick Catheter: Not present    Disposition: Medically Ready for Discharge: Anticipated Today    Goals to discharge include: cleared by cardiology  Family updated today: No     Interval History   Pt seen and examined.  Pt denies any further cp, sob.    -Data reviewed today: I personally reviewed all new labs and imaging results over the last 24 hours.     Physical Exam   Temp: 98  F (36.7  C) Temp src: Oral BP: 106/76 Pulse: 87   Resp: 14 SpO2: (P) 97 % O2 Device: (P) None (Room air) Oxygen Delivery: 3 LPM  Vitals:    11/19/24 0544 11/20/24 0703 11/21/24 0608   Weight: 86.6 kg (190 lb 14.4 oz) 85.8 kg (189 lb 1.6 oz) 86.7 kg (191 lb 1.6 oz)     Vital Signs with Ranges  Temp:  [96.2  F (35.7  C)-98.2  F (36.8  C)] 98  F (36.7   C)  Pulse:  [66-87] 87  Resp:  [11-24] 14  BP: ()/(54-82) 106/76  SpO2:  [92 %-98 %] (P) 97 %  I/O last 3 completed shifts:  In: -   Out: 850 [Urine:850]    GENERAL: No apparent distress. Awake, alert, and fully oriented.  HEENT: Normocephalic, atraumatic. Extraocular movements intact.  CARDIOVASCULAR: Regular rate and rhythm without murmurs or rubs. No S3.  PULMONARY: Clear bilaterally.  GASTROINTESTINAL: Soft, non-tender, non-distended. Bowel sounds normoactive.   EXTREMITIES: No cyanosis or clubbing. No edema.  NEUROLOGICAL: CN 2-12 grossly intact, no focal neurological deficits.  DERMATOLOGICAL: No rash, ulcer, bruising, nor jaundice.    Medications   Current Facility-Administered Medications   Medication Dose Route Frequency Provider Last Rate Last Admin     Current Facility-Administered Medications   Medication Dose Route Frequency Provider Last Rate Last Admin    apixaban ANTICOAGULANT (ELIQUIS) tablet 5 mg  5 mg Oral BID Chaparro Richardson MD   5 mg at 11/20/24 2224    busPIRone (BUSPAR) tablet 10 mg  10 mg Oral At Bedtime Dominick Das MD   10 mg at 11/20/24 2224    busPIRone (BUSPAR) tablet 5 mg  5 mg Oral QAM Dominick Das MD   5 mg at 11/20/24 0918    empagliflozin (JARDIANCE) tablet 10 mg  10 mg Oral Daily Damon Cabello DO        [Held by provider] furosemide (LASIX) half-tab 10 mg  10 mg Oral Daily Chaparro Richardson MD        levETIRAcetam (KEPPRA) tablet 1,000 mg  1,000 mg Oral At Bedtime Dominick Das MD   1,000 mg at 11/20/24 2224    levETIRAcetam (KEPPRA) tablet 500 mg  500 mg Oral QAM Dominick Das MD   500 mg at 11/20/24 0918    losartan (COZAAR) half-tab 12.5 mg  12.5 mg Oral Daily Dominick Das MD        metoprolol succinate ER (TOPROL XL) 24 hr tablet 25 mg  25 mg Oral Daily Octavio Lepe MD   25 mg at 11/20/24 1054    nortriptyline (PAMELOR) capsule 75 mg  75 mg Oral At Bedtime Dominick Das MD   75 mg at 11/20/24 2230    rosuvastatin  "(CRESTOR) tablet 20 mg  20 mg Oral At Bedtime Dominick Das MD   20 mg at 11/20/24 2225    sodium chloride (PF) 0.9% PF flush 3 mL  3 mL Intracatheter Q8H Dominick Das MD   3 mL at 11/20/24 2231     Data     Laboratory:  Recent Labs   Lab 11/20/24  0850 11/18/24  1441   WBC 10.9 8.6   HGB 17.4 15.4   HCT 52.1 44.9   MCV 89 88    236     Recent Labs   Lab 11/21/24  0639 11/20/24  0642 11/19/24  0642    139 139   POTASSIUM 4.3  4.3 4.1 4.2   CHLORIDE 103 101 103   CO2 25 25 21*   ANIONGAP 12 13 15   GLC 94 96 127*   BUN 21.1* 25.8* 18.8   CR 1.05 0.95 0.83   GFRESTIMATED 82 >90 >90   JAMES 8.8 9.2 9.1     No results for input(s): \"CULT\" in the last 168 hours.  No results found for: \"TROPI\"    Imaging:  Recent Results (from the past 24 hours)   Cardiac Catheterization    Narrative    Nonischemic Cardiomyopathy  LV ejection fraction of 25%.  Only mild atheromatous changes noted on   coronary angiography.  Left bundle branch block.  History of paroxysmal   atrial fibrillation        Coronary angiogram  Left main: Normal  LAD: Mild generalized atheromatous change involving the proximal and mid   vessel.  There is very mild aneurysmal change distant with positive   remodeling  Circumflex: Mild atheromatous change with no focal narrowing  Right coronary: Dominant.  Mild atheromatous change with no significant   focal narrowing.           Damon Cabello DO  [facility] Hospitalist  [address] [place] [address]  Securely message with [employer] (more info)  Text page via [employer] Paging/Directory   11/21/2024   "

## 2024-11-21 NOTE — PROGRESS NOTES
[facility]  Cardiology Progress Note    Outpatient cardiologist: Dr. Saba    Date of Service (when I saw the patient): 11/21/2024      Summary: Clement Robert is a 59 year old male with history of seizure disorder, chronic left bundle branch block, paroxysmal atrial fibrillation, who was admitted on 11/18/2024 with progressive dyspnea on exertion.     Patient has a remote history of atrial fibrillation, had been seen by colleagues Dr. Saba and Kerry Barcenas in 2011.  At that time he had recently been diagnosed with atrial fibrillation, underwent cardioversion.  Coronary CTA 11/2011 calcium score 0, some mild soft plaque in the left circumflex artery.  Stress echocardiogram 2009 negative, notably LVEF 55-60% and normal LV size.  [id number] was 0, and so was on aspirin monotherapy at that time.  Started on simvastatin as well.      Since then Lexiscan stress test 3/16/2020 was negative for inducible myocardial ischemia or infarction.  He had a left bundle branch block at that time.     With that background in mind, patient presented to the ED 11/18/2024 with dyspnea over the preceding 2 weeks.  He had also noticed some intermittent tachycardia.  No chest pain, no abnormal lower extremity swelling.  He does note symptoms of orthopnea/PND a couple of days ago.  In the ED initial blood pressure was 118/93 mmHg.  ECG demonstrated borderline sinus tachycardia at approximately 100 bpm, left bundle branch block, QTc 538 ms.  Initial labs in the ED notable for NT proBNP 4800, high-sensitivity troponin normal, TSH normal, electrolytes normal, CBC normal.  .  He was admitted to the Internal Medicine service.  He was started on diuretics.  TTE 11/19/2024 demonstrated LVEF 22% with severe global hypokinesis, severe LV dilatation LVIDD 7.7 cm, normal RV function, mild MR.     He does note that his wife had URI symptoms a few weeks ago, and he has had some coughing a couple of weeks ago,  "also received the COVID/flu vaccine combination a couple of weeks ago.     Family history notable for father dying from \"heart attack in his 40s\", details unclear.    Interval History   Tele: Sinus with BBB, PVCs up to couplets, and one run of 4 beat NSVT 11/20/24  AM around 11:20. Just single PVCs since    Cardiac cath yesterday without obstructive CAD.   He is feeling ok now.   No chest pain.   Breathing is ok.          Assessment & Plan   # Acute HFrEF LVEF 20-25%, new dx, chronicity unknown.     # Nonischemic, dilated cardiomyopathy  -  TTE 11/19/2024 demonstrated LVEF 22% with severe global hypokinesis, severe LV dilatation LVIDD 7.7 cm, normal RV function, mild MR.  - Etiology: nonischemic.  May be related to LBBB or genetic as father passed away suddenly from \"heart attack\" in his 40s, details unclear.   Unlikely myocarditis given normal trop.  No significant alcohol use. No illicit drug use. TSH WNL.   - Cardiac cath 11/20/24: only mild atheromatous changes, no significant obstructive CAD   - Plan outpatient [facility]  - Genetics referral   - GDMT:   * BB: Toprol XL 25 mg   * ACEi/ARB/ARNI: Losartan 12.5 mg. BP running soft.  Can consider Entresto in follow up if BP allows.    * SGLT2i: Jardiance 10 mg started today   * MRA. Will plan on low-dose aldosterone antagonist in the outpatient setting as BP allows  - Volume:   * Appears euvolemic without diuretics.  Will need to continue to evaluate for need  - With his ectopy, family history of sudden death, and his EF 20%, will proceed with LifeVest. Order page completed and  will fax. They are coming at 10:00 today for fitting and education  - Echo in 3 months.  If EF < 35%, will need to consider CRT-D. If EF > 35%, consider CRT-P.   - CORE follow up arranged  - Outpatient cardiac rehab       # Remote history of paroxysmal atrial fibrillation   - [id number] at least 1, discussed risks/benefits, after discussion patient would like to start anticoagulation.  " No hx of abnormal bleeding.  Now on Eliquis   - Toprol XL 25 mg      # Chronic LBBB, would need to consider CRT-D if appropriate       # Mild CAD  - Eliquis for PAF        About an hour spent with patient and wife answering questions.   Ok to discharge today.       Follow up:  CBC, Core labs and CORE CASE within 1 week (ordered)  CMRI in the next week or so (ordered)  Genetics referral (ordered)  Echo in 3 months. If no improvement in LVEF, refer to [facility]        Edith Aguillon PA-C      Patient Active Problem List   Diagnosis    Seizures (H)    Anxiety    Epilepsy without status epilepticus, not intractable, unspecified    Acute pharyngitis, unspecified etiology    LBBB (left bundle branch block)    Acute congestive heart failure, unspecified heart failure type (H)       Physical Exam   Temp: 98  F (36.7  C) Temp src: Oral BP: 106/76 Pulse: 87   Resp: 14 SpO2: (P) 97 % O2 Device: (P) None (Room air) Oxygen Delivery: 3 LPM  Vitals:    11/19/24 0544 11/20/24 0703 11/21/24 0608   Weight: 86.6 kg (190 lb 14.4 oz) 85.8 kg (189 lb 1.6 oz) 86.7 kg (191 lb 1.6 oz)     Vital Signs with Ranges  Temp:  [96.2  F (35.7  C)-98.2  F (36.8  C)] 98  F (36.7  C)  Pulse:  [66-87] 87  Resp:  [11-24] 14  BP: ()/(54-82) 106/76  SpO2:  [92 %-98 %] (P) 97 %  I/O last 3 completed shifts:  In: -   Out: 850 [Urine:850]    Constitutional: NAD.   Respiratory: CTAB.   Cardiovascular: RRR, s1s2, no sig murmur  GI: soft, BS+  Skin: warm, no rashes  Musculoskeletal: Moving all extremities  Neurologic: Alert, oriented x 3  Neuropsychiatric: Normal affect       Data   Recent Labs   Lab 11/21/24  0639 11/20/24  0850 11/20/24  0642 11/19/24  0642 11/18/24  1441   WBC  --  10.9  --   --  8.6   HGB  --  17.4  --   --  15.4   MCV  --  89  --   --  88   PLT  --  262  --   --  236   INR  --   --   --   --  0.99     --  139 139 138   POTASSIUM 4.3  4.3  --  4.1 4.2 4.6   CHLORIDE 103  --  101 103 101   CO2 25  --  25 21* 24   BUN 21.1*  --   25.8* 18.8 17.0   CR 1.05  --  0.95 0.83 1.05   ANIONGAP 12  --  13 15 13   JAMES 8.8  --  9.2 9.1 9.3   GLC 94  --  96 127* 91       Recent Results (from the past 24 hours)   Cardiac Catheterization    Narrative    Nonischemic Cardiomyopathy  LV ejection fraction of 25%.  Only mild atheromatous changes noted on   coronary angiography.  Left bundle branch block.  History of paroxysmal   atrial fibrillation        Coronary angiogram  Left main: Normal  LAD: Mild generalized atheromatous change involving the proximal and mid   vessel.  There is very mild aneurysmal change distant with positive   remodeling  Circumflex: Mild atheromatous change with no focal narrowing  Right coronary: Dominant.  Mild atheromatous change with no significant   focal narrowing.         Medications   Current Facility-Administered Medications   Medication Dose Route Frequency Provider Last Rate Last Admin     Current Facility-Administered Medications   Medication Dose Route Frequency Provider Last Rate Last Admin    apixaban ANTICOAGULANT (ELIQUIS) tablet 5 mg  5 mg Oral BID Chaparro Richardson MD   5 mg at 11/20/24 2224    busPIRone (BUSPAR) tablet 10 mg  10 mg Oral At Bedtime Dominick Das MD   10 mg at 11/20/24 2224    busPIRone (BUSPAR) tablet 5 mg  5 mg Oral QAM Dominick Das MD   5 mg at 11/20/24 0918    empagliflozin (JARDIANCE) tablet 10 mg  10 mg Oral Daily Damon Cabello DO        [Held by provider] furosemide (LASIX) half-tab 10 mg  10 mg Oral Daily Chaparro Richardson MD        levETIRAcetam (KEPPRA) tablet 1,000 mg  1,000 mg Oral At Bedtime Dominick Das MD   1,000 mg at 11/20/24 2224    levETIRAcetam (KEPPRA) tablet 500 mg  500 mg Oral QAM Dominick Das MD   500 mg at 11/20/24 0918    losartan (COZAAR) half-tab 12.5 mg  12.5 mg Oral Daily Dominick Das MD        metoprolol succinate ER (TOPROL XL) 24 hr tablet 25 mg  25 mg Oral Daily Octavio Lepe MD   25 mg at 11/20/24 1054    nortriptyline  (PAMELOR) capsule 75 mg  75 mg Oral At Bedtime Dominick Das MD   75 mg at 11/20/24 2230    rosuvastatin (CRESTOR) tablet 20 mg  20 mg Oral At Bedtime Dominick Das MD   20 mg at 11/20/24 2225    sodium chloride (PF) 0.9% PF flush 3 mL  3 mL Intracatheter Q8H Dominick Das MD   3 mL at 11/20/24 2231

## 2024-11-21 NOTE — PLAN OF CARE
"Patient discharged with personal belongings, LifeVest, dispensed medications and discharge instructions. Instructions and follow up were explained to patient and his wife and he was given an opportunity to ask questions. Patient was able to state understanding and had no further questions. Driven home by wife.    Goal Outcome Evaluation:      Plan of Care Reviewed With: patient    Overall Patient Progress: improving    Outcome Evaluation: Right wrist site has a smalll amount of bruising present, no change in amount. Tele: SR with inverted T waves, BBB and prolonged QTc. Was fitted with a LifeVest in preparation for discharge.      Problem: Adult Inpatient Plan of Care  Goal: Plan of Care Review  Description: The Plan of Care Review/Shift note should be completed every shift.  The Outcome Evaluation is a brief statement about your assessment that the patient is improving, declining, or no change.  This information will be displayed automatically on your shift  note.  Outcome: Adequate for Care Transition  Flowsheets (Taken 11/21/2024 9351)  Outcome Evaluation: Right wrist site has a smalll amount of bruising present, no change in amount. Tele: SR with inverted T waves, BBB and prolonged QTc. Was fitted with a LifeVest in preparation for discharge.  Plan of Care Reviewed With: patient  Overall Patient Progress: improving  Goal: Patient-Specific Goal (Individualized)  Description: You can add care plan individualizations to a care plan. Examples of Individualization might be:  \"Parent requests to be called daily at 9am for status\", \"I have a hard time hearing out of my right ear\", or \"Do not touch me to wake me up as it startles  me\".  Outcome: Adequate for Care Transition  Goal: Absence of Hospital-Acquired Illness or Injury  Outcome: Adequate for Care Transition  Intervention: Identify and Manage Fall Risk  Recent Flowsheet Documentation  Taken 11/21/2024 0905 by Delicia Teran RN  Safety Promotion/Fall " Prevention:   clutter free environment maintained   safety round/check completed  Taken 11/21/2024 0720 by Delicia Teran, RN  Safety Promotion/Fall Prevention: safety round/check completed  Goal: Optimal Comfort and Wellbeing  Outcome: Adequate for Care Transition  Goal: Readiness for Transition of Care  Outcome: Adequate for Care Transition     Problem: Dysrhythmia  Goal: Normalized Cardiac Rhythm  Outcome: Adequate for Care Transition     Problem: Gas Exchange Impaired  Goal: Optimal Gas Exchange  Outcome: Adequate for Care Transition

## 2024-11-21 NOTE — PLAN OF CARE
Patient A&O x4, calm and cooperative. Patient asking many questions in an attempt to understand his care and plan, education provided. On RA. Denies SOB, N/V. PRN acetaminophen administered x1 for headache. Diet advanced to cardiac diet. Up independently in room. Reviewed safety measures. Mild edema in right fingers post-angio but CMS intact. Continue with plan of care. Per MD note, anticipate discharge home tomorrow.    Goal Outcome Evaluation:      Plan of Care Reviewed With: patient    Overall Patient Progress: improvingOverall Patient Progress: improving    Outcome Evaluation: Angiogram completed this shift with right radial site. Site is WDL with bandaid present. Minimal bruising present just superior to site. Remains SR with PVCs on tele.    Heart Failure Care Map  GOALS TO BE MET BEFORE DISCHARGE:    1. Decrease congestion and/or edema with diuretic therapy to achieve near optimal volume status.     Dyspnea improved: Yes, satisfactory for discharge.   Edema improved: Yes, satisfactory for discharge.        Last 24 hour I/O:   Intake/Output Summary (Last 24 hours) at 11/20/2024 1843  Last data filed at 11/20/2024 1805  Gross per 24 hour   Intake 250 ml   Output 2650 ml   Net -2400 ml           Net I/O and Weights since admission:   10/21 2300 - 11/20 2259  In: 679 [P.O.:670; I.V.:9]  Out: 4780 [Urine:4780]  Net: -4101     Vitals:    11/18/24 1426 11/18/24 1934 11/19/24 0544 11/20/24 0703   Weight: 91.9 kg (202 lb 9.6 oz) 89.3 kg (196 lb 14.4 oz) 86.6 kg (190 lb 14.4 oz) 85.8 kg (189 lb 1.6 oz)       2.  O2 sats > 90% on room air, or at prior home O2 therapy level.      Able to wean O2 this shift to keep sats above 90%?: Yes, satisfactory for discharge.   Does patient use Home O2? No          Current oxygenation status:   SpO2: 96 %     O2 Device: None (Room air), Oxygen Delivery: 3 LPM    3.  Tolerates ambulation and mobility near baseline.     Ambulation: Yes, satisfactory for discharge.   Times patient  "ambulated with staff this shift: 0, patient independent, ambulating in room    Please review the Heart Failure Care Map for additional HF goal outcomes.    Delicia Teran RN  11/20/2024      Problem: Adult Inpatient Plan of Care  Goal: Plan of Care Review  Description: The Plan of Care Review/Shift note should be completed every shift.  The Outcome Evaluation is a brief statement about your assessment that the patient is improving, declining, or no change.  This information will be displayed automatically on your shift  note.  Outcome: Progressing  Flowsheets (Taken 11/20/2024 1824)  Outcome Evaluation: Angiogram completed this shift with right radial site. Site is WDL with bandaid present. Minimal bruising present just superior to site. Remains SR with PVCs on tele.  Plan of Care Reviewed With: patient  Overall Patient Progress: improving  Goal: Patient-Specific Goal (Individualized)  Description: You can add care plan individualizations to a care plan. Examples of Individualization might be:  \"Parent requests to be called daily at 9am for status\", \"I have a hard time hearing out of my right ear\", or \"Do not touch me to wake me up as it startles  me\".  Outcome: Progressing  Goal: Absence of Hospital-Acquired Illness or Injury  Outcome: Progressing  Intervention: Identify and Manage Fall Risk  Recent Flowsheet Documentation  Taken 11/20/2024 0918 by Delicia Teran, RN  Safety Promotion/Fall Prevention:   assistive device/personal items within reach   clutter free environment maintained   lighting adjusted   nonskid shoes/slippers when out of bed   patient and family education   safety round/check completed  Intervention: Prevent Skin Injury  Recent Flowsheet Documentation  Taken 11/20/2024 0918 by Delicia Teran, RN  Body Position: position changed independently  Goal: Optimal Comfort and Wellbeing  Outcome: Progressing  Intervention: Monitor Pain and Promote Comfort  Recent Flowsheet Documentation  Taken 11/20/2024 " 0912 by Delicia Teran, RN  Pain Management Interventions: pain management plan reviewed with patient/caregiver  Taken 11/20/2024 0748 by Delicia Teran RN  Pain Management Interventions: MD notified (comment)  Goal: Readiness for Transition of Care  Outcome: Progressing     Problem: Dysrhythmia  Goal: Normalized Cardiac Rhythm  Outcome: Progressing     Problem: Gas Exchange Impaired  Goal: Optimal Gas Exchange  Outcome: Progressing

## 2024-11-21 NOTE — DISCHARGE SUMMARY
Hospitalist Discharge Summary  [facility]    Clement Robert MRN# [id number]   YOB: 1965 Age: 59 year old     Date of Admission:  11/18/2024  Date of Discharge:  11/21/2024  Admitting Physician:  Attila Tom DO  Discharge Physician:  Damon Cabello DO  Discharging Service:  Hospitalist     Primary Provider: [facility], [facility]          Discharge Diagnosis:     New HFrEF - EF 22%  - Appreciate Cardiology recommendations  - Echo showed EF 22%, severe global hypokinesia of LV, moderately dilated RA, mild MR  - s/p coronary angiogram on 11/20/2024 showing mild atheromatous changes, EF 25%  - Start metoprolol, losartan, rosuvastatin - consider transition to Entresto  - Start jardiance  - Lifevest prior to discharge  - Cardiac Rehab  - Telemetry  - Consider addition of MRA/Spironolactone as outpatient     Hx of Atrial Fibrillation s/p Cardioversion  - Hit by hockey puck in 2011 and found to have afib and cardioverted in the ED.    - Started on eliquis per Cardiology     Chronic Medical Problems:  Seizure Disorder  Migraines  Anxiety             Discharge Disposition:     Discharged to home           Hospital Course:     Clement Robert is a 59 year old male with a history of atrial fibrillation status post cardioversion after being hit by a hockey puck in 2011, seizure disorder, migraines, anxiety admitted on 11/18/2024 with dyspnea on exertion.  In the emergency department, the patient is found have a temperature of 98.3  F, heart rate 95, respiratory rate 24, blood pressure 118/93, SpO2 99% on room air.  Initial lab work showed proBNP 4808, high-sensitivity troponin 21, remaining CBC and BMP within normal limits.  EKG showed sinus rhythm with possible left atrial enlargement and left bundle branch block.  Chest x-ray showed cardiac enlargement with bilateral pulmonary venous congestion and small right pleural effusion.  Bilateral lower extremity venous duplex Doppler  were negative for DVT.  The patient was started on IV Lasix.  Echocardiogram showed ejection fraction 22% with severe global hypokinesia of the left ventricle and moderately dilated right atrium and mild mitral valve regurgitation.  Cardiology was consulted to see the patient.  Patient was started on Toprol, losartan, rosuvastatin, and Jardiance.  Coronary angiogram on 11/20/2024 showed mild atheromatous changes and EF of 25%.  The patient was fitted for a LifeVest prior to discharge and seen by cardiac rehab.      The patient was seen, examined, and counseled on this day. The hospitalization and plan of care was reviewed with the patient extensively. All questions were addressed and the patient agreed to follow-up as noted above.           Allergies:     No Known Allergies           Discharge Medications:     Current Discharge Medication List        START taking these medications    Details   apixaban ANTICOAGULANT (ELIQUIS) 5 MG tablet Take 1 tablet (5 mg) by mouth 2 times daily.  Qty: 60 tablet, Refills: 0    Associated Diagnoses: Non-ischemic cardiomyopathy (H)      empagliflozin (JARDIANCE) 10 MG TABS tablet Take 1 tablet (10 mg) by mouth daily.  Qty: 30 tablet, Refills: 0    Associated Diagnoses: Non-ischemic cardiomyopathy (H)      losartan (COZAAR) 25 MG tablet Take 0.5 tablets (12.5 mg) by mouth daily.  Qty: 15 tablet, Refills: 0    Associated Diagnoses: Non-ischemic cardiomyopathy (H)      metoprolol succinate ER (TOPROL XL) 25 MG 24 hr tablet Take 1 tablet (25 mg) by mouth daily.  Qty: 30 tablet, Refills: 0    Associated Diagnoses: Non-ischemic cardiomyopathy (H)      rosuvastatin (CRESTOR) 20 MG tablet Take 1 tablet (20 mg) by mouth at bedtime.  Qty: 30 tablet, Refills: 0    Associated Diagnoses: Non-ischemic cardiomyopathy (H)           CONTINUE these medications which have NOT CHANGED    Details   !! busPIRone (BUSPAR) 5 MG tablet Take 10 mg by mouth at bedtime.      !! busPIRone (BUSPAR) 5 MG tablet  "Take 5 mg by mouth every morning.      !! levETIRAcetam (KEPPRA) 500 MG tablet Take 1,000 mg by mouth at bedtime.      !! levETIRAcetam (KEPPRA) 500 MG tablet Take 500 mg by mouth every morning.  Refills: 6      nortriptyline (PAMELOR) 75 MG capsule TAKE 1 CAPSULE BY MOUTH AT BEDTIME*       !! - Potential duplicate medications found. Please discuss with provider.        STOP taking these medications       naproxen (NAPROSYN) 500 MG tablet Comments:   Reason for Stopping:                      Condition on Discharge:     Discharge condition: Fair   Discharge vitals: Blood pressure 106/76, pulse 87, temperature 98  F (36.7  C), temperature source Oral, resp. rate 14, height 1.778 m (5' 10\"), weight 86.7 kg (191 lb 1.6 oz), SpO2 (P) 97%.   Code status on discharge: Full Code      BASIC PHYSICAL EXAMINATION:  GENERAL: No apparent distress.  CARDIOVASCULAR: Regular rate and rhythm without murmurs.  PULMONARY: Clear to auscultation bilaterally.   GASTROINTESTINAL: Abdomen soft, non-tender.  EXTREMITIES: No edema, pulses intact.  NEUROLOGIC: No focal deficits.            History of Illness:   See detailed admission note for full details.               Procedures excluding imaging which is summarized below:     Please see details in the electronic medical record.           Consultations:     CARDIOLOGY IP CONSULT  CARE MANAGEMENT / SOCIAL WORK IP CONSULT  CORE CLINIC EVALUATION IP CONSULT  PHARMACY LIAISON FOR MEDICATION COVERAGE CONSULT  CORE CLINIC EVALUATION IP CONSULT  PHARMACY IP CONSULT  CARDIAC REHAB IP CONSULT  PHARMACY DISCHARGE EDUCATION BY PHARMACIST  SMOKING CESSATION PROGRAM IP CONSULT          Significant Results:     Results for orders placed or performed during the hospital encounter of 11/18/24   XR Chest 2 Views    Narrative    CHEST TWO VIEWS  11/18/2024 3:44 PM     HISTORY:  Shortness of breath.    COMPARISON: 3/4/2016.      Impression    IMPRESSION: Cardiac enlargement and bilateral pulmonary " venous  congestion are new since the previous exam. Lungs clear. Small right  pleural effusion. No pneumothorax. Aortic calcification.    DALI FOY MD         SYSTEM ID:  [id number]   US Lower Extremity Venous Duplex Bilateral    Narrative    [facility]  DATE: 2024    EXAM: BILATERAL LOWER EXTREMITY VENOUS ULTRASOUND    INDICATION: Lower extremity swelling, Shortness of breath, concern for  PE DVT     TECHNIQUE: Duplex imaging was performed utilizing gray-scale,  compression, augmentation as appropriate, color-flow, Doppler waveform  analysis, and spectral Doppler imaging.    COMPARISON: No pertinent comparison study is available for review.    FINDINGS:  RIGHT LEG: The common femoral, profunda femoral, femoral, popliteal,  and segmentally visualized calf veins are patent and compressible with  appropriate vascular waveforms. No deep venous thrombus is identified.    LEFT LEG: The common femoral, profunda femoral, femoral, popliteal,  and segmentally visualized calf veins are patent and compressible with  appropriate vascular waveforms. No deep venous thrombus is identified.      Impression    IMPRESSION:   1.  RIGHT LEG: Negative for deep vein thrombosis.  2.  LEFT LEG: Negative for deep vein thrombosis.    EDELMIRA CASON MD         SYSTEM ID:  [id number]   Echocardiogram Complete     Value    Biplane LVEF 22%    Narrative    [id number]  [id number]  [id number]  [id number]^TRACI^GIACOMO^MAVIS     [facility]  Echocardiography Laboratory  [address] [place] [address]     Name: LUNA JACOBSON  MRN: [id number]  : 1965  Study Date: 2024 09:28 AM  Age: 59 yrs  Gender: Male  Patient Location: [facility]  Reason For Study: Heart Failure  Ordering Physician: GIACOMO AVILES  Performed By: Paola Penny     BSA: 2.1 m2  Height: 70 in  Weight: 196 lb  BP: 123/85 mmHg  ______________________________________________________________________________  Procedure  Complete Portable  Echo Adult. Optison (NDC #0164-9477) given intravenously.  ______________________________________________________________________________  Interpretation Summary     Left ventricular systolic function is severely reduced.Biplane LVEF is  22%.There is severe global hypokinesia of the left ventricle.The left  ventricle is severely dilated.  The right ventricular systolic function is normal.  There is mild (1+) mitral regurgitation.  IVC diameter <2.1 cm collapsing >50% with sniff suggests a normal RA pressure  of 3 mmHg.     Stress echo dated 02/4/2009 noted normal LVEF at rest  ______________________________________________________________________________  Left Ventricle  The left ventricle is severely dilated. There is normal left ventricular wall  thickness. Left ventricular systolic function is severely reduced. Biplane  LVEF is 22%. There is severe global hypokinesia of the left ventricle.     Right Ventricle  The right ventricle is normal size. The right ventricular systolic function is  normal.     Atria  The left atrium is mild to moderately dilated. Right atrial size is normal.  There is no color Doppler evidence of an atrial shunt.     Mitral Valve  There is mild (1+) mitral regurgitation.     Tricuspid Valve  There is trace tricuspid regurgitation. The right ventricular systolic  pressure is approximated at 27.7 mmHg plus the right atrial pressure.     Aortic Valve  The aortic valve is trileaflet. No aortic regurgitation is present. No aortic  stenosis is present.     Pulmonic Valve  There is no pulmonic valvular stenosis.     Vessels  Borderline dilated aortic root measuring 3.9 cm. Normal size ascending aorta.  IVC diameter <2.1 cm collapsing >50% with sniff suggests a normal RA pressure  of 3 mmHg.     Pericardium  There is no pericardial effusion.     ______________________________________________________________________________  MMode/2D Measurements & Calculations  IVSd: 0.85 cm  LVIDd: 7.7 cm  LVIDs:  6.6 cm  LVPWd: 0.76 cm     FS: 14.2 %  LV mass(C)d: 289.0 grams  LV mass(C)dI: 139.6 grams/m2  Ao root diam: 3.9 cm  LVOT diam: 2.5 cm  LVOT area: 4.9 cm2  Ao root diam index Ht(cm/m): 2.2  Ao root diam index BSA (cm/m2): 1.9  EF Biplane: 21.5 %  LA Volume (BP): 86.5 ml  LA Volume Index (BP): 41.8 ml/m2  RWT: 0.20     TAPSE: 2.0 cm     Doppler Measurements & Calculations  LV V1 max P.7 mmHg  LV V1 max: 81.8 cm/sec  LV V1 VTI: 15.4 cm  SV(LVOT): 74.7 ml  SI(LVOT): 36.1 ml/m2  PA acc time: 0.11 sec  TR max santa: 262.9 cm/sec  TR max P.7 mmHg     ______________________________________________________________________________  Report approved by: Colin Rehman 2024 10:13 AM         Cardiac Catheterization    Narrative    Nonischemic Cardiomyopathy  LV ejection fraction of 25%.  Only mild atheromatous changes noted on   coronary angiography.  Left bundle branch block.  History of paroxysmal   atrial fibrillation        Coronary angiogram  Left main: Normal  LAD: Mild generalized atheromatous change involving the proximal and mid   vessel.  There is very mild aneurysmal change distant with positive   remodeling  Circumflex: Mild atheromatous change with no focal narrowing  Right coronary: Dominant.  Mild atheromatous change with no significant   focal narrowing.         Transthoracic Echocardiogram Results:  No results found for this or any previous visit (from the past 4320 hours).             Pending Results:     Unresulted Labs Ordered in the Past 30 Days of this Admission       Date and Time Order Name Status Description    2024  3:18 PM Blood Culture Peripheral Blood Preliminary                         Discharge Instructions and Follow-Up:     Discharge instructions and follow-up:   Discharge Procedure Orders   MRI Cardiac w/contrast   Standing Status: Future Standing Exp. Date: 25     Order Specific Question Answer Comments   Priority Routine    Clinical Info for the Interpreting  Provider NICM    Does the patient have a cochlear implant, pacemaker or ICD wires? If so, please contact the MRI department where the scan will be performed to verify compatibility before proceeding. NO - order as requested    Does the patient have claustrophobia or have a condition (pain, Parkinson's, RLS, mental status, etc.) that inhibits them to hold still for an extended period of time? No    Procedure to be scheduled at CV Imaging [facility] [10]      CBC with platelets   Standing Status: Future Standing Exp. Date: 11/21/25     Adult Genetics & Metabolism  Referral   Standing Status: Future   Referral Priority: Routine: Next available opening Referral Type: Consultation   Requested Specialty: Genetics, Clinical   Number of Visits Requested: 1     Adult Nutrition  Referral   Standing Status: Future   Referral Priority: Routine: Next available opening Referral Type: Nutrition   Number of Visits Requested: 1     Primary Care Referral   Standing Status: Future   Referral Priority: Priority: 1-2 Weeks Referral Type: Consultation   Number of Visits Requested: 1     Cardiac Rehab  Referral   Standing Status: Future   Referral Priority: Routine Referral Type: Rehab Therapy Cardiac Therapy   Number of Visits Requested: 1     Medication Instructions - Anticoagulants   Order Comments: Do NOT stop your aspirin or platelet inhibitor unless directed by your Cardiologist.  These medications help to prevent platelets in your blood from sticking together and forming a clot.  Examples of these medications are:  Ticagrelor (Brilinta), Clopidigrel (Plavix), Prasugrel (Effient)     When to call - Contact the Heart Clinic   Order Comments: You may experience symptoms that require follow-up before your scheduled appointment. Contact the Heart Clinic if you develop: Fever over 100.4o Fahrenheit, that lasts more than one day; Redness, heat, or pus at the puncture site; Change in color or temperature in  your hand or arm.     When to call - Reasons to Call 911   Order Comments: If your wrist puncture site starts bleeding after discharge, sit down and apply firm pressure with your thumb against the puncture site and fingers against the back of the wrist for 10 minutes. If the bleeding stops, continue to rest, keeping your wrist still for 2 hours. Notify your doctor as soon as possible.  IF BLEEDING DOES NOT STOP OR THERE IS A LARGER AMOUNT OF BLEEDING OR SPURTING CALL 9-1-1 immediately.DO NOT drive yourself to the hospital.     Precautions - Lifting   Order Comments: DO NOT lift more than 5 pounds with affected arm for 48 hours     Precautions - Household Activities   Order Comments: Avoid excessive bending or movement of your wrist for 72 hours.  Do not subject hand/arm to any forceful movements for 24 hours, such as supporting weight when rising from a chair or bed.     Remove the band-aid on the puncture site after 24 hours and leave open to air. If minor oozing, you may apply a band-aid and remove after 12 hours.     Precautions - Active Sports Activities   Order Comments: DO NOT engage in vigorous exercise using your affected arm for 3 days after discharge.  This includes golf, tennis or swimming.     Precautions - Operating yard equipment or vehicles   Order Comments: Do not operate a chainsaw, lawnmower, motorcycle, or all-terrain vehicle for 48 hours after the procedure.     Precautions - Elective Dental Work   Order Comments: NO elective dental work for 6 weeks after receiving a stent.     Comfort and Pain Management - Bruising after Surgery   Order Comments: Expect mild tingling of hand and tenderness at the wrist puncture site for up to 3 days. You may take Tylenol or a pain medicine recommended by your doctor.     Activity - Cardiac Rehab   Order Comments: You are encouraged to enroll in an Outpatient Cardiac Rehab program after discharge from the hospital.  Our Cardiac Rehab staff may visit briefly with  you while you're in the hospital.  If they miss you, someone will contact you after you are home.     Return to Driving   Order Comments: Driving is NOT permitted for 24 hours after surgery     Return to work   Order Comments: You may return to work after 72 hours if you are feeling well and your job does not involve heavy lifting.     Shower / Bathing   Order Comments: You may shower on the day after your procedure.  DO NOT soak of wrist with the puncture site in water for 3 days to prevent infection. DO NOT take a tub bath or wash dishes for 3 days after the procedure     Dressing Removal   Order Comments: Remove the band-aid on the puncture site after 24 hours and leave open to air. If minor oozing, you may apply a band-aid and remove after 12 hours     Reason for your hospital stay   Order Comments: Nonischemic Cardiomyopathy - New Onset Heart Failure with Ejection Fraction of 22%     Follow-up and recommended labs and tests    Order Comments: Follow up with primary care provider within 7 days for hospital follow- up.  The following labs/tests are recommended: BMP, Blood Pressure check.      Recommend you purchase a blood pressure cuff from any local pharmacy if you do not have 1 already.  Recommend you check your blood pressure once daily.  Preferably in the morning, after your morning restroom but before your morning caffeine.  Record these values and take them to your primary care doctor or cardiology appointment for further titration of your blood pressure medications.     Activity   Order Comments: Your activity upon discharge: activity as tolerated     Order Specific Question Answer Comments   Is discharge order? Yes      Diet   Order Comments: Follow this diet upon discharge: Current Diet:Orders Placed This Encounter      Low Saturated Fat Na <2400 mg, Mediterranean Diet     Order Specific Question Answer Comments   Is discharge order? Yes           Total time spent in face to face contact with the  patient and coordinating discharge was:  34 Minutes    Damon Cabello DO  [facility] Hospitalist  [address] [place] [place] [address]  11/21/2024

## 2024-11-21 NOTE — PLAN OF CARE
"Pt A&O x4. Denies pain, SOB. K, mag, phos re-check AM. Pending discharge today per cardiology.     Problem: Adult Inpatient Plan of Care  Goal: Plan of Care Review  Description: The Plan of Care Review/Shift note should be completed every shift.  The Outcome Evaluation is a brief statement about your assessment that the patient is improving, declining, or no change.  This information will be displayed automatically on your shift  note.  Outcome: Progressing  Flowsheets (Taken 11/21/2024 0635)  Outcome Evaluation: Right wrist CMS intact and bruising present. Tele: SR w/ inverted Ts, BBB, PVCs.  Plan of Care Reviewed With: patient  Overall Patient Progress: no change  Goal: Patient-Specific Goal (Individualized)  Description: You can add care plan individualizations to a care plan. Examples of Individualization might be:  \"Parent requests to be called daily at 9am for status\", \"I have a hard time hearing out of my right ear\", or \"Do not touch me to wake me up as it startles  me\".  Outcome: Progressing  Goal: Absence of Hospital-Acquired Illness or Injury  Outcome: Progressing  Intervention: Identify and Manage Fall Risk  Recent Flowsheet Documentation  Taken 11/20/2024 2232 by Carleen Thakkar RN  Safety Promotion/Fall Prevention:   safety round/check completed   clutter free environment maintained   nonskid shoes/slippers when out of bed  Intervention: Prevent Skin Injury  Recent Flowsheet Documentation  Taken 11/20/2024 2232 by Carleen Thakkar RN  Body Position: position changed independently  Intervention: Prevent and Manage VTE (Venous Thromboembolism) Risk  Recent Flowsheet Documentation  Taken 11/20/2024 2232 by Carleen Thakkar RN  VTE Prevention/Management: SCDs off (sequential compression devices)  Goal: Optimal Comfort and Wellbeing  Outcome: Progressing  Goal: Readiness for Transition of Care  Outcome: Progressing     Problem: Dysrhythmia  Goal: Normalized Cardiac Rhythm  Outcome: Progressing  Intervention: Monitor " and Manage Cardiac Rhythm Effect  Recent Flowsheet Documentation  Taken 11/20/2024 2232 by Carleen Thakkar, RN  VTE Prevention/Management: SCDs off (sequential compression devices)     Problem: Gas Exchange Impaired  Goal: Optimal Gas Exchange  Outcome: Progressing  Intervention: Optimize Oxygenation and Ventilation  Recent Flowsheet Documentation  Taken 11/20/2024 2232 by Carleen Thakkar, RN  Head of Bed (HOB) Positioning: HOB at 30-45 degrees   Goal Outcome Evaluation:      Plan of Care Reviewed With: patient    Overall Patient Progress: no changeOverall Patient Progress: no change    Outcome Evaluation: Right wrist CMS intact and bruising present. Tele: SR w/ inverted Ts, BBB, PVCs.

## 2024-11-21 NOTE — PROGRESS NOTES
Cardiac Rehab Discharge Summary    Reason for therapy discharge:    All goals and outcomes met, no further needs identified.    Progress towards therapy goal(s). See goals on Care Plan in Epic electronic health record for goal details.  Goals met    Therapy recommendation(s):    Continued therapy is recommended.  Rationale/Recommendations:  OP CR for monitored activity and exercise, lifestyle mgmt classes.  Continue home exercise program.

## 2024-11-21 NOTE — PROGRESS NOTES
11/21/24 1500   Appointment Info   Signing Clinician's Name / Credentials (OT) Cinda Jimenez, OTR/L   Rehab Comments (OT) Initial CR Eval   Living Environment   Living Environment Comments inpd. house with stairs. wife   Self-Care   Usual Activity Tolerance excellent   Current Activity Tolerance good   Activity/Exercise/Self-Care Comment indp   Instrumental Activities of Daily Living (IADL)   IADL Comments indp   General Information   Onset of Illness/Injury or Date of Surgery 11/18/24   Referring Physician Damon Cabello, DO   Patient/Family Therapy Goal Statement (OT) to home this year   Additional Occupational Profile Info/Pertinent History of Current Problem Clement Robert is a 59 year old male with a history of atrial fibrillation status post cardioversion after being hit by a hockey puck in 2011, seizure disorder, migraines, anxiety admitted on 11/18/2024 with dyspnea on exertion.  In the emergency department, the patient is found have a temperature of 98.3  F, heart rate 95, respiratory rate 24, blood pressure 118/93, SpO2 99% on room air.  Initial lab work showed proBNP 4808, high-sensitivity troponin 21, remaining CBC and BMP within normal limits.  EKG showed sinus rhythm with possible left atrial enlargement and left bundle branch block.  Chest x-ray showed cardiac enlargement with bilateral pulmonary venous congestion and small right pleural effusion.  Bilateral lower extremity venous duplex Doppler were negative for DVT.  The patient was started on IV Lasix.  Echocardiogram showed ejection fraction 22% with severe global hypokinesia of the left ventricle and moderately dilated right atrium and mild mitral valve regurgitation.  Cardiology was consulted to see the patient.  Patient was started on Toprol, losartan, rosuvastatin, and Jardiance.  Coronary angiogram on 11/20/2024 showed mild atheromatous changes and EF of 25%.  The patient was fitted for a LifeVest prior to discharge and seen by  cardiac rehab.   Cognitive Status Examination   Orientation Status orientation to person, place and time   Visual Perception   Visual Impairment/Limitations corrective lenses full-time   Sensory   Sensory Quick Adds sensation intact   Pain Assessment   Patient Currently in Pain Yes, see Vital Sign flowsheet   Range of Motion Comprehensive   General Range of Motion no range of motion deficits identified   Strength Comprehensive (MMT)   General Manual Muscle Testing (MMT) Assessment no strength deficits identified   Muscle Tone Assessment   Muscle Tone Quick Adds No deficits were identified   Coordination   Upper Extremity Coordination No deficits were identified   Transfers   Transfer Comments indp amb in room   Activities of Daily Living   BADL Assessment/Intervention other (see comments)  (taxing IADL, health mgmt)   Clinical Impression   Criteria for Skilled Therapeutic Interventions Met (OT) Yes, treatment indicated   OT Diagnosis decrease function in health mgmt and endurance for ADL   OT Problem List-Impairments impacting ADL problems related to;activity tolerance impaired   Identified Performance Deficits taxing IADL and health mgmt   Planned Therapy Interventions (OT) ADL retraining;IADL retraining   Clinical Decision Making Complexity (OT) problem focused assessment/low complexity   Risk & Benefits of therapy have been explained evaluation/treatment results reviewed;care plan/treatment goals reviewed;current/potential barriers reviewed;risks/benefits reviewed;participants voiced agreement with care plan;participants included;patient   Clinical Impression Comments decreased function in ADL warrants skilled therapy   OT Total Evaluation Time   OT Eval, Low Complexity Minutes (57627) 8   OT Goals   Therapy Frequency (OT) One time eval and treatment   OT Predicted Duration/Target Date for Goal Attainment 11/21/24   OT Goals OT Goal 1;Cardiac Phase 1   OT: Understanding of cardiac education to maximize quality of  life, condition management, and health outcomes Patient;Verbalize   OT: Perform aerobic activity with stable cardiovascular response continuous;ambulation;10 minutes   OT: Functional/aerobic ambulation tolerance with stable cardiovascular response in order to return to home and community environment Independent;Greater than 300 feet   OT: Navigation of stairs simulating home set up with stable cardiovascular response in order to return to home and community environment Independent;Greater than 10 stairs   OT: Goal 1 Pt will verbalize 3 HF mgmt strategies   Interventions   Interventions Quick Adds Cardiac Rehab;Self-Care/Home Management;Therapeutic Procedures/Exercise;Therapeutic Activity   Self-Care/Home Management   Self-Care/Home Mgmt/ADL, Compensatory, Meal Prep Minutes (66026) 23   Treatment Detail/Skilled Intervention CR and HF education as below. verbalized understanding, asking appropiate questions. ed on rouintes and how to incoportate HF technique, verbalize 3   Therapeutic Procedures/Exercise   Therapeutic Procedure: strength, endurance, ROM, flexibillity minutes (68722) 15   Symptoms Noted During/After Treatment none   Treatment Detail/Skilled Intervention amb and staris as below with skilled monitoring of vital signs   Treatment Time Includes (CR Only) See specific exercise details intervention group(s);Monitoring of vital signs (see vital signs flowsheet for details);Extra time managing multiple lines/tubes   Ambulation   Duration (minutes) 10 minutes   Effort Scale 4   Symptoms Denies symptoms   Cardiovascular Response Normal   Exercise Details amb indp   Vital Signs Details normal. see chart   Stairs   Workload 10 stairs   Duration (minutes) 3 minutes   Effort Scale 3   Symptoms Denies symptoms   Cardiovascular Response Normal   Cardiac Education   Education Provided OMNI Scale;Home exercise program;Energy conservation;Emergency plan;Diet;Diagnosis;Daily weights;Heart anatomy;Outpatient Cardiac  Rehab;Precautions;Resuming home activities;Risk factors;Signs and symptoms;Stop light tool   Education Packet Given to Patient Yes   All Patient Education Handouts Reviewed with Patient and/or Family Yes   Cardiac Rehab Phase II Plan   Phase II Order Received Yes  (MD Cabello put order in)   OT Discharge Planning   OT Plan 1x eval and tx   OT Discharge Recommendation (DC Rec) home with outpatient cardiac rehab   OT Rationale for DC Rec rec OP CR for monitoring activity, continued education. will benefit from OT training and education with decreased tolerance for ADL/ IADL and new health mgmt needs   OT Brief overview of current status indp   Total Session Time   Timed Code Treatment Minutes 38   Total Session Time (sum of timed and untimed services) 46

## 2024-11-23 LAB — BACTERIA BLD CULT: NO GROWTH

## 2024-11-25 ENCOUNTER — LAB (OUTPATIENT)
Dept: LAB | Facility: CLINIC | Age: 59
End: 2024-11-25
Payer: COMMERCIAL

## 2024-11-25 DIAGNOSIS — R06.02 SOB (SHORTNESS OF BREATH): ICD-10-CM

## 2024-11-25 DIAGNOSIS — I50.22 CHRONIC SYSTOLIC CONGESTIVE HEART FAILURE (H): ICD-10-CM

## 2024-11-25 DIAGNOSIS — I48.91 A-FIB (H): ICD-10-CM

## 2024-11-25 DIAGNOSIS — I50.9 ACUTE CONGESTIVE HEART FAILURE, UNSPECIFIED HEART FAILURE TYPE (H): ICD-10-CM

## 2024-11-25 LAB
ALBUMIN SERPL BCG-MCNC: 4.2 G/DL (ref 3.5–5.2)
ALP SERPL-CCNC: 83 U/L (ref 40–150)
ALT SERPL W P-5'-P-CCNC: 42 U/L (ref 0–70)
ANION GAP SERPL CALCULATED.3IONS-SCNC: 14 MMOL/L (ref 7–15)
AST SERPL W P-5'-P-CCNC: 22 U/L (ref 0–45)
BILIRUB SERPL-MCNC: 1.2 MG/DL
BUN SERPL-MCNC: 17.7 MG/DL (ref 6–20)
CALCIUM SERPL-MCNC: 8.9 MG/DL (ref 8.8–10.4)
CHLORIDE SERPL-SCNC: 101 MMOL/L (ref 98–107)
CREAT SERPL-MCNC: 1.1 MG/DL (ref 0.67–1.17)
EGFRCR SERPLBLD CKD-EPI 2021: 78 ML/MIN/1.73M2
ERYTHROCYTE [DISTWIDTH] IN BLOOD BY AUTOMATED COUNT: 11.8 % (ref 10–15)
GLUCOSE SERPL-MCNC: 106 MG/DL (ref 70–99)
HCO3 SERPL-SCNC: 22 MMOL/L (ref 22–29)
HCT VFR BLD AUTO: 45.7 % (ref 40–53)
HGB BLD-MCNC: 15.3 G/DL (ref 13.3–17.7)
MAGNESIUM SERPL-MCNC: 2.2 MG/DL (ref 1.7–2.3)
MCH RBC QN AUTO: 29.4 PG (ref 26.5–33)
MCHC RBC AUTO-ENTMCNC: 33.5 G/DL (ref 31.5–36.5)
MCV RBC AUTO: 88 FL (ref 78–100)
NT-PROBNP SERPL-MCNC: 1063 PG/ML (ref 0–900)
PLATELET # BLD AUTO: 204 10E3/UL (ref 150–450)
POTASSIUM SERPL-SCNC: 4.2 MMOL/L (ref 3.4–5.3)
PROT SERPL-MCNC: 6.4 G/DL (ref 6.4–8.3)
RBC # BLD AUTO: 5.21 10E6/UL (ref 4.4–5.9)
SODIUM SERPL-SCNC: 137 MMOL/L (ref 135–145)
WBC # BLD AUTO: 4.7 10E3/UL (ref 4–11)

## 2024-11-25 NOTE — PROGRESS NOTES
Cardiology [facility] (heart failure specialty) Clinic Progress Note    Clement Robert MRN# [id number]   YOB: 1965 Age: 59 year old     Primary cardiology team: Dr. Richardson         Assessment and Plan     In summary, Clement Robert presents today for a [facility] clinic enrollment visit.     HFrEF  EF: 22% (11/2024 TTE)  ACC/AHA stage C; FC II  Etiology: unclear; nonischemic; +FH of SCD in father (40's)  Valves: mild MR  Volume: Appears well-compensated  GDMT: jardiance 10, Losartan 12.5, Toprol 25.  QRS: Wide - LBBB  Device: None - Lifevest in place  Code status: Full  Creatinine: Normal  CHF admissions: 11/2024    PAF.  Isolated episode, none documented since 2011.  Now anticoagulated for a [id number] of 1 per Dr. Richardson.    Plan:  Will keep today's medications the same.  Marginal blood pressure precludes up titration.  Referral to .  Cardiac MRI in a few weeks, see me again after that.   He is doing a fantastic job with sodium restriction, which I suggested he could relax slightly. Counseled on importance of daily weights and when to call [facility] clinic with weight gain.   Cardiac rehab.  Consider CRT down the line if no significant EF improvement.    Follow-up:  With me in 3 weeks.  With Dr. Richardson in 3 months, echo prior.      Delicia Kc PA-C  [facility]         History of Presenting Illness     Clement Robert is a pleasant 59 year old patient with a pertinent history of the following -   PAF.  Diagnosed in 2011 after being hit by hockey puck and sent to ED. Underwent cardioversion.  EF normal at that time.  Recently started on anticoagulation with Eliquis. [id number] sore is 1.  NICMP  Pt was lost to follow-up after 2011 until hospitalization in November 2024, with progressive dyspnea and intermittent palpitations, found to have a severely reduced ejection fraction of 20% with global hypokinesis and severe dilation with an LVIDD of 7.7 cm, normal RV function, mild MR.  "Trops normal.   Diuresed, placed on Toprol 25, Losartan 12.5, jardiance 10, no diuretics. Discharge wt 190 lbs.  Given concerns for ventricular ectopy and family history of sudden cardiac death, LifeVest was placed on discharge.  LBBB, newly noted in 2020. Stress nuclear study at that time showed EF 56%, no ischemia.  Very mild CAD on 11/2024 angiogram.  +FH of SCD father dying from \"heart attack\" in his 40's.   Seizure disorder, migraines, anxiety.  No significant alcohol use. No illicit drug use.     Today, Harshil returns to clinic stating he's feeling generally well, still of course with a functional capacity below his baseline but improved from pre-admission. He was discharged from hospital five days ago and is still processing his diagnosis. He denies chest pain, orthopnea, edema, claudication, palpitations, near syncope or syncope. Weighing daily, wt has come down about 5 lbs with strict sodium restriction. Home BP's have been 90's/60's. He has had a couple \"flushing\" sensations with no evidence of arrhythmia on his fitbit or warnings from his LifeVest. Went back to work yesterday, job is nonexertional.   Labs show normal renal fn/electrolytes, proBNP slightly above normal at 1K, CBC/mag WNL.         Review of Systems     12-pt ROS is negative except for as noted in the HPI.          Physical Exam     Vitals: /60 (BP Location: Right arm, Patient Position: Sitting, Cuff Size: Adult Regular)   Pulse 70   Ht 1.778 m (5' 10\")   Wt 88 kg (194 lb)   SpO2 100%   BMI 27.84 kg/m    Wt Readings from Last 10 Encounters:   11/26/24 88 kg (194 lb)   11/21/24 86.7 kg (191 lb 1.6 oz)   03/04/16 89.8 kg (198 lb)   11/18/11 86.2 kg (190 lb)       Constitutional:  Patient is pleasant, alert, cooperative, and in NAD.  HEENT:  NCAT. PERRLA. EOM's intact.   Neck:  CVP appears normal. No carotid bruits.   Pulmonary: Normal respiratory effort. CTAB.   Cardiac: RRR, normal S1/S2, no S3/S4, no murmur or rub.   Abdomen:  " Non-tender abdomen, no hepatosplenomegaly appreciated.   Vascular: Pulses in the upper and lower extremities are 2+ and equal bilaterally.  Extremities: No edema, erythema, cyanosis or tenderness appreciated.  Skin:  No rashes or lesions appreciated.   Neurological:  No gross motor or sensory deficits.   Psych: Appropriate affect.          Data   Labs reviewed:  Recent Labs   Lab Test 11/25/24  0830 11/19/24  0642 11/18/24  1441   LDL  --  145*  --    HDL  --  65  --    NHDL  --  154*  --    CHOL  --  219*  --    TRIG  --  47  --    TSH  --   --  4.12   NTBNP 1,063*  --   --        Lab Results   Component Value Date    WBC 4.7 11/25/2024    WBC 8.5 11/18/2011    RBC 5.21 11/25/2024    RBC 4.90 11/18/2011    HGB 15.3 11/25/2024    HGB 15.3 11/18/2011    HCT 45.7 11/25/2024    HCT 43.2 11/18/2011    MCV 88 11/25/2024    MCV 88 11/18/2011    MCH 29.4 11/25/2024    MCH 31.2 11/18/2011    MCHC 33.5 11/25/2024    MCHC 35.4 11/18/2011    RDW 11.8 11/25/2024    RDW 12.1 11/18/2011     11/25/2024     11/18/2011       Lab Results   Component Value Date     11/25/2024     11/18/2011    POTASSIUM 4.2 11/25/2024    POTASSIUM 4.0 11/18/2011    CHLORIDE 101 11/25/2024    CHLORIDE 104 11/18/2011    CO2 22 11/25/2024    CO2 26 11/18/2011    ANIONGAP 14 11/25/2024    ANIONGAP 7 11/18/2011     (H) 11/25/2024    GLC 96 11/18/2011    BUN 17.7 11/25/2024    BUN 12 11/18/2011    CR 1.10 11/25/2024    CR 0.74 11/18/2011    GFRESTIMATED 78 11/25/2024    GFRESTIMATED >90 11/18/2011    GFRESTBLACK >90 11/18/2011    JAMES 8.9 11/25/2024    JAMES 8.7 11/18/2011      Lab Results   Component Value Date    AST 22 11/25/2024    AST 41 07/29/2010    ALT 42 11/25/2024    ALT     34 11/28/2011    ALT 34 11/28/2011       Lab Results   Component Value Date    A1C 5.1 11/18/2024       Lab Results   Component Value Date    INR 0.99 11/18/2024            Problem List     Patient Active Problem List   Diagnosis    Seizures (H)     Anxiety    Epilepsy without status epilepticus, not intractable, unspecified    Acute pharyngitis, unspecified etiology    LBBB (left bundle branch block)    Acute congestive heart failure, unspecified heart failure type (H)            Medications     Current Outpatient Medications   Medication Sig Dispense Refill    apixaban ANTICOAGULANT (ELIQUIS) 5 MG tablet Take 1 tablet (5 mg) by mouth 2 times daily. 60 tablet 0    busPIRone (BUSPAR) 5 MG tablet Take 10 mg by mouth at bedtime.      busPIRone (BUSPAR) 5 MG tablet Take 5 mg by mouth every morning.      empagliflozin (JARDIANCE) 10 MG TABS tablet Take 1 tablet (10 mg) by mouth daily. 30 tablet 0    levETIRAcetam (KEPPRA) 500 MG tablet Take 1,000 mg by mouth at bedtime.      levETIRAcetam (KEPPRA) 500 MG tablet Take 500 mg by mouth every morning.  6    losartan (COZAAR) 25 MG tablet Take 0.5 tablets (12.5 mg) by mouth daily. 15 tablet 0    metoprolol succinate ER (TOPROL XL) 25 MG 24 hr tablet Take 1 tablet (25 mg) by mouth daily. 30 tablet 0    nortriptyline (PAMELOR) 75 MG capsule TAKE 1 CAPSULE BY MOUTH AT BEDTIME*      rosuvastatin (CRESTOR) 20 MG tablet Take 1 tablet (20 mg) by mouth at bedtime. 30 tablet 0            Past Medical History     Past Medical History:   Diagnosis Date    Anxiety     Seizures (H)      Past Surgical History:   Procedure Laterality Date    CV CORONARY ANGIOGRAM N/A 11/20/2024    Procedure: Coronary Angiogram;  Surgeon: Dominick Das MD;  Location:  [facility]     Family History   Problem Relation Age of Onset    Family History Negative Mother     Family History Negative Father      Social History     Socioeconomic History    Marital status:      Spouse name: Not on file    Number of children: Not on file    Years of education: Not on file    Highest education level: Not on file   Occupational History    Not on file   Tobacco Use    Smoking status: Never    Smokeless tobacco: Never   Vaping Use     Vaping status: Never Used   Substance and Sexual Activity    Alcohol use: Yes     Comment: occ    Drug use: No    Sexual activity: Yes     Partners: Female   Other Topics Concern    Parent/sibling w/ CABG, MI or angioplasty before 65F 55M? Not Asked   Social History Narrative    Not on file     Social Drivers of Health     Financial Resource Strain: Low Risk  (11/19/2024)    Financial Resource Strain     Within the past 12 months, have you or your family members you live with been unable to get utilities (heat, electricity) when it was really needed?: No   Food Insecurity: Low Risk  (11/19/2024)    Food Insecurity     Within the past 12 months, did you worry that your food would run out before you got money to buy more?: No     Within the past 12 months, did the food you bought just not last and you didn t have money to get more?: No   Transportation Needs: Low Risk  (11/19/2024)    Transportation Needs     Within the past 12 months, has lack of transportation kept you from medical appointments, getting your medicines, non-medical meetings or appointments, work, or from getting things that you need?: No   Physical Activity: Not on file   Stress: Not on file   Social Connections: Socially Integrated (11/26/2023)    [facility]    Social Connections     Do you often feel lonely or isolated from those around you?: 0   Interpersonal Safety: Low Risk  (11/19/2024)    Interpersonal Safety     Do you feel physically and emotionally safe where you currently live?: Yes     Within the past 12 months, have you been hit, slapped, kicked or otherwise physically hurt by someone?: No     Within the past 12 months, have you been humiliated or emotionally abused in other ways by your partner or ex-partner?: No   Housing Stability: Low Risk  (11/19/2024)    Housing Stability     Do you have housing? : Yes     Are you worried about losing your housing?: No            Allergies   Patient  has no known allergies.    Today's clinic visit entailed:  I spent a total of 45 minutes on the day of the visit.   Time spent by me today doing chart review, history and exam, documentation and further activities per the note  Provider  Link to MDM Help Grid     The level of medical decision making during this visit was of high complexity.    The longitudinal plan of care for the diagnosis(es)/condition(s) as documented were addressed during this visit. Due to the added complexity in care, I will continue to support Harshil in the subsequent management and with ongoing continuity of care.

## 2024-11-26 ENCOUNTER — OFFICE VISIT (OUTPATIENT)
Dept: CARDIOLOGY | Facility: CLINIC | Age: 59
End: 2024-11-26
Attending: INTERNAL MEDICINE
Payer: COMMERCIAL

## 2024-11-26 VITALS
HEIGHT: 70 IN | WEIGHT: 194 LBS | BODY MASS INDEX: 27.77 KG/M2 | SYSTOLIC BLOOD PRESSURE: 104 MMHG | OXYGEN SATURATION: 100 % | DIASTOLIC BLOOD PRESSURE: 60 MMHG | HEART RATE: 70 BPM

## 2024-11-26 DIAGNOSIS — I50.22 CHRONIC SYSTOLIC CONGESTIVE HEART FAILURE (H): ICD-10-CM

## 2024-11-26 NOTE — LETTER
11/26/2024    [facility]  [address]  [facility] [address]    RE: Clement Robert       Dear Colleague,     I had the pleasure of seeing Clement Robert in the [facility].    Cardiology C.O.R.E (heart failure specialty) Clinic Progress Note    Clement Robert MRN# [id number]   YOB: 1965 Age: 59 year old     Primary cardiology team: Dr. Richardson         Assessment and Plan     In summary, Clement Robert presents today for a CORE clinic enrollment visit.     HFrEF  EF: 22% (11/2024 TTE)  ACC/AHA stage C; FC II  Etiology: unclear; nonischemic; +FH of SCD in father (40's)  Valves: mild MR  Volume: Appears well-compensated  GDMT: jardiance 10, Losartan 12.5, Toprol 25.  QRS: Wide - LBBB  Device: None - Lifevest in place  Code status: Full  Creatinine: Normal  CHF admissions: 11/2024    PAF.  Isolated episode, none documented since 2011.  Now anticoagulated for a [id number] of 1 per Dr. iRchardson.    Plan:  Will keep today's medications the same.  Marginal blood pressure precludes up titration.  Referral to .  Cardiac MRI in a few weeks, see me again after that.   He is doing a fantastic job with sodium restriction, which I suggested he could relax slightly. Counseled on importance of daily weights and when to call CORE clinic with weight gain.   Cardiac rehab.  Consider CRT down the line if no significant EF improvement.    Follow-up:  With me in 3 weeks.  With Dr. Richardson in 3 months, echo prior.      [facility] - [facility]         History of Presenting Illness     Clement Robert is a pleasant 59 year old patient with a pertinent history of the following -   PAF.  Diagnosed in 2011 after being hit by hockey puck and sent to ED. Underwent cardioversion.  EF normal at that time.  Recently started on anticoagulation with Eliquis. [id number] sore is 1.  NICMP  Pt was lost to follow-up after 2011 until hospitalization in November  "2024, with progressive dyspnea and intermittent palpitations, found to have a severely reduced ejection fraction of 20% with global hypokinesis and severe dilation with an LVIDD of 7.7 cm, normal RV function, mild MR. Trops normal.   Diuresed, placed on Toprol 25, Losartan 12.5, jardiance 10, no diuretics. Discharge wt 190 lbs.  Given concerns for ventricular ectopy and family history of sudden cardiac death, LifeVest was placed on discharge.  LBBB, newly noted in 2020. Stress nuclear study at that time showed EF 56%, no ischemia.  Very mild CAD on 11/2024 angiogram.  +FH of SCD father dying from \"heart attack\" in his 40's.   Seizure disorder, migraines, anxiety.  No significant alcohol use. No illicit drug use.     Today, Harshil returns to clinic stating he's feeling generally well, still of course with a functional capacity below his baseline but improved from pre-admission. He was discharged from hospital five days ago and is still processing his diagnosis. He denies chest pain, orthopnea, edema, claudication, palpitations, near syncope or syncope. Weighing daily, wt has come down about 5 lbs with strict sodium restriction. Home BP's have been 90's/60's. He has had a couple \"flushing\" sensations with no evidence of arrhythmia on his fitbit or warnings from his LifeVest. Went back to work yesterday, job is nonexertional.   Labs show normal renal fn/electrolytes, proBNP slightly above normal at 1K, CBC/mag WNL.         Review of Systems     12-pt ROS is negative except for as noted in the HPI.          Physical Exam     Vitals: /60 (BP Location: Right arm, Patient Position: Sitting, Cuff Size: Adult Regular)   Pulse 70   Ht 1.778 m (5' 10\")   Wt 88 kg (194 lb)   SpO2 100%   BMI 27.84 kg/m    Wt Readings from Last 10 Encounters:   11/26/24 88 kg (194 lb)   11/21/24 86.7 kg (191 lb 1.6 oz)   03/04/16 89.8 kg (198 lb)   11/18/11 86.2 kg (190 lb)       Constitutional:  Patient is pleasant, alert, cooperative, " and in NAD.  HEENT:  NCAT. PERRLA. EOM's intact.   Neck:  CVP appears normal. No carotid bruits.   Pulmonary: Normal respiratory effort. CTAB.   Cardiac: RRR, normal S1/S2, no S3/S4, no murmur or rub.   Abdomen:  Non-tender abdomen, no hepatosplenomegaly appreciated.   Vascular: Pulses in the upper and lower extremities are 2+ and equal bilaterally.  Extremities: No edema, erythema, cyanosis or tenderness appreciated.  Skin:  No rashes or lesions appreciated.   Neurological:  No gross motor or sensory deficits.   Psych: Appropriate affect.          Data   Labs reviewed:  Recent Labs   Lab Test 11/25/24  0830 11/19/24  0642 11/18/24  1441   LDL  --  145*  --    HDL  --  65  --    NHDL  --  154*  --    CHOL  --  219*  --    TRIG  --  47  --    TSH  --   --  4.12   NTBNP 1,063*  --   --        Lab Results   Component Value Date    WBC 4.7 11/25/2024    WBC 8.5 11/18/2011    RBC 5.21 11/25/2024    RBC 4.90 11/18/2011    HGB 15.3 11/25/2024    HGB 15.3 11/18/2011    HCT 45.7 11/25/2024    HCT 43.2 11/18/2011    MCV 88 11/25/2024    MCV 88 11/18/2011    MCH 29.4 11/25/2024    MCH 31.2 11/18/2011    MCHC 33.5 11/25/2024    MCHC 35.4 11/18/2011    RDW 11.8 11/25/2024    RDW 12.1 11/18/2011     11/25/2024     11/18/2011       Lab Results   Component Value Date     11/25/2024     11/18/2011    POTASSIUM 4.2 11/25/2024    POTASSIUM 4.0 11/18/2011    CHLORIDE 101 11/25/2024    CHLORIDE 104 11/18/2011    CO2 22 11/25/2024    CO2 26 11/18/2011    ANIONGAP 14 11/25/2024    ANIONGAP 7 11/18/2011     (H) 11/25/2024    GLC 96 11/18/2011    BUN 17.7 11/25/2024    BUN 12 11/18/2011    CR 1.10 11/25/2024    CR 0.74 11/18/2011    GFRESTIMATED 78 11/25/2024    GFRESTIMATED >90 11/18/2011    GFRESTBLACK >90 11/18/2011    JAMES 8.9 11/25/2024    JAMES 8.7 11/18/2011      Lab Results   Component Value Date    AST 22 11/25/2024    AST 41 07/29/2010    ALT 42 11/25/2024    ALT     34 11/28/2011    ALT 34 11/28/2011        Lab Results   Component Value Date    A1C 5.1 11/18/2024       Lab Results   Component Value Date    INR 0.99 11/18/2024            Problem List     Patient Active Problem List   Diagnosis     Seizures (H)     Anxiety     Epilepsy without status epilepticus, not intractable, unspecified     Acute pharyngitis, unspecified etiology     LBBB (left bundle branch block)     Acute congestive heart failure, unspecified heart failure type (H)            Medications     Current Outpatient Medications   Medication Sig Dispense Refill     apixaban ANTICOAGULANT (ELIQUIS) 5 MG tablet Take 1 tablet (5 mg) by mouth 2 times daily. 60 tablet 0     busPIRone (BUSPAR) 5 MG tablet Take 10 mg by mouth at bedtime.       busPIRone (BUSPAR) 5 MG tablet Take 5 mg by mouth every morning.       empagliflozin (JARDIANCE) 10 MG TABS tablet Take 1 tablet (10 mg) by mouth daily. 30 tablet 0     levETIRAcetam (KEPPRA) 500 MG tablet Take 1,000 mg by mouth at bedtime.       levETIRAcetam (KEPPRA) 500 MG tablet Take 500 mg by mouth every morning.  6     losartan (COZAAR) 25 MG tablet Take 0.5 tablets (12.5 mg) by mouth daily. 15 tablet 0     metoprolol succinate ER (TOPROL XL) 25 MG 24 hr tablet Take 1 tablet (25 mg) by mouth daily. 30 tablet 0     nortriptyline (PAMELOR) 75 MG capsule TAKE 1 CAPSULE BY MOUTH AT BEDTIME*       rosuvastatin (CRESTOR) 20 MG tablet Take 1 tablet (20 mg) by mouth at bedtime. 30 tablet 0            Past Medical History     Past Medical History:   Diagnosis Date     Anxiety      Seizures (H)      Past Surgical History:   Procedure Laterality Date     CV CORONARY ANGIOGRAM N/A 11/20/2024    Procedure: Coronary Angiogram;  Surgeon: Dominick Das MD;  Location: [facility]     Family History   Problem Relation Age of Onset     Family History Negative Mother      Family History Negative Father      Social History     Socioeconomic History     Marital status:      Spouse name: Not on file      Number of children: Not on file     Years of education: Not on file     Highest education level: Not on file   Occupational History     Not on file   Tobacco Use     Smoking status: Never     Smokeless tobacco: Never   Vaping Use     Vaping status: Never Used   Substance and Sexual Activity     Alcohol use: Yes     Comment: occ     Drug use: No     Sexual activity: Yes     Partners: Female   Other Topics Concern     Parent/sibling w/ CABG, MI or angioplasty before 65F 55M? Not Asked   Social History Narrative     Not on file     Social Drivers of Health     Financial Resource Strain: Low Risk  (11/19/2024)    Financial Resource Strain      Within the past 12 months, have you or your family members you live with been unable to get utilities (heat, electricity) when it was really needed?: No   Food Insecurity: Low Risk  (11/19/2024)    Food Insecurity      Within the past 12 months, did you worry that your food would run out before you got money to buy more?: No      Within the past 12 months, did the food you bought just not last and you didn t have money to get more?: No   Transportation Needs: Low Risk  (11/19/2024)    Transportation Needs      Within the past 12 months, has lack of transportation kept you from medical appointments, getting your medicines, non-medical meetings or appointments, work, or from getting things that you need?: No   Physical Activity: Not on file   Stress: Not on file   Social Connections: Socially Integrated (11/26/2023)    [facility]    Social Connections      Do you often feel lonely or isolated from those around you?: 0   Interpersonal Safety: Low Risk  (11/19/2024)    Interpersonal Safety      Do you feel physically and emotionally safe where you currently live?: Yes      Within the past 12 months, have you been hit, slapped, kicked or otherwise physically hurt by someone?: No      Within the past 12 months, have you been humiliated or  emotionally abused in other ways by your partner or ex-partner?: No   Housing Stability: Low Risk  (11/19/2024)    Housing Stability      Do you have housing? : Yes      Are you worried about losing your housing?: No            Allergies   Patient has no known allergies.    Today's clinic visit entailed:  I spent a total of 45 minutes on the day of the visit.   Time spent by me today doing chart review, history and exam, documentation and further activities per the note  Provider  Link to [facility] Help Grid     The level of medical decision making during this visit was of high complexity.    The longitudinal plan of care for the diagnosis(es)/condition(s) as documented were addressed during this visit. Due to the added complexity in care, I will continue to support Harshil in the subsequent management and with ongoing continuity of care.      Thank you for allowing me to participate in the care of your patient.      Sincerely,     Delicia Kc PA-C     [facility]  cc:   Chaparro Richardson MD  [address]  [place] [address]

## 2024-11-26 NOTE — PATIENT INSTRUCTIONS
Today, we discussed the following:  Medication changes:   None for the moment.  Follow up:   Schedule a cardiac MRI and  visit (Delicia Schmidt) at your convenience. See me again after MRI.   See Dr. Richardson with an echo in 3 months.    Please, remember to continue the followin.  Weigh yourself daily. Call if your weight is up > than 2 pounds in one day, or 5 pounds in one week; if you feel more short of breath or have worsening swelling in your legs or abdomen.  2.  Eat a low sodium diet (less than 2,000mg or 2g daily). If you eat less salt, you will retain less fluid.   3.  Avoid alcohol, as this can worsen heart failure.   4.  Avoid NSAIDs as able (For example, Ibuprofen / aleve / advil / naprosen / diclofenac).    Thank you for your visit with the C.O.R.E. Clinic today.   CORE stands for Cardiomyopathy Optimization Rehabilitation and Education. The CORE clinic will teach and help you to manage your heart failure and keep you out of the hospital.    Call C.O.R.E. nurse for any questions or concerns Mon-Fri 8am-4pm  [phone]: Nurse number   [phone]: After Hours Phone Number

## 2024-12-03 ENCOUNTER — OFFICE VISIT (OUTPATIENT)
Dept: INTERNAL MEDICINE | Facility: CLINIC | Age: 59
End: 2024-12-03
Payer: COMMERCIAL

## 2024-12-03 VITALS
SYSTOLIC BLOOD PRESSURE: 92 MMHG | RESPIRATION RATE: 16 BRPM | DIASTOLIC BLOOD PRESSURE: 63 MMHG | HEART RATE: 72 BPM | BODY MASS INDEX: 27.59 KG/M2 | TEMPERATURE: 97.3 F | WEIGHT: 192.3 LBS | OXYGEN SATURATION: 99 %

## 2024-12-03 DIAGNOSIS — I42.8 NON-ISCHEMIC CARDIOMYOPATHY (H): ICD-10-CM

## 2024-12-03 DIAGNOSIS — R56.9 SEIZURES (H): ICD-10-CM

## 2024-12-03 DIAGNOSIS — I50.9 ACUTE CONGESTIVE HEART FAILURE, UNSPECIFIED HEART FAILURE TYPE (H): ICD-10-CM

## 2024-12-03 DIAGNOSIS — Z09 HOSPITAL DISCHARGE FOLLOW-UP: Primary | ICD-10-CM

## 2024-12-03 DIAGNOSIS — I44.7 LBBB (LEFT BUNDLE BRANCH BLOCK): ICD-10-CM

## 2024-12-03 PROBLEM — G40.909 EPILEPSY, UNSPECIFIED, NOT INTRACTABLE, WITHOUT STATUS EPILEPTICUS (H): Status: ACTIVE | Noted: 2020-11-20

## 2024-12-03 PROBLEM — K63.5 POLYP OF COLON: Status: ACTIVE | Noted: 2024-12-03

## 2024-12-03 PROCEDURE — 80048 BASIC METABOLIC PNL TOTAL CA: CPT

## 2024-12-03 PROCEDURE — 36415 COLL VENOUS BLD VENIPUNCTURE: CPT

## 2024-12-03 ASSESSMENT — PAIN SCALES - GENERAL: PAINLEVEL_OUTOF10: NO PAIN (0)

## 2024-12-03 NOTE — PROGRESS NOTES
"  Assessment & Plan     Hospital discharge follow-up  Patient presents to the clinic for hospital discharge follow-up regarding nonischemic cardiomyopathy, left bundle branch block and acute congestive heart failure.  Patient is currently wearing his LifeVest.  He is feeling much improved however he does experience some shortness of breath.  We will update patient's labs per discharge paperwork recommendation.  - Basic metabolic panel  (Ca, Cl, CO2, Creat, Gluc, K, Na, BUN); Future  - Basic metabolic panel  (Ca, Cl, CO2, Creat, Gluc, K, Na, BUN)    Non-ischemic cardiomyopathy (H)  Currently stable at this time.  Patient is planned to start cardiac rehab this week.  Patient also has follow-up appointments with cardiology in the near future as as well as an MRI.  Patient is still taking his blood thinning medication as directed.    Seizures (H)  Currently stable.    LBBB (left bundle branch block)  Stable.    Acute congestive heart failure, unspecified heart failure type (H)  Patient is currently stable.  His LifeVest is in place and he is wearing it as directed.  He is also compliant with his medications and has follow-up appointments with cardiac rehab and cardiology scheduled as necessary.  He will undergo a cardiac MRI in the next week or 2.  All patient's questions were answered.  Patient will reach out to cardiology if he has any future questions.    -I did advise that the patient needs to get established with a primary care provider.    MED REC REQUIRED  Post Medication Reconciliation Status:     BMI  Estimated body mass index is 27.59 kg/m  as calculated from the following:    Height as of 11/26/24: 1.778 m (5' 10\").    Weight as of this encounter: 87.2 kg (192 lb 4.8 oz).   Weight management plan: Patient was referred to their PCP to discuss a diet and exercise plan.          Lisa Chua is a 59 year old, presenting for the following health issues:   history of atrial fibrillation status post " cardioversion after being hit by a hockey puck in 2011, seizure disorder, migraines, anxiety admitted on 11/18/2024 with dyspnea on exertion.  In the emergency department, the patient is found have a temperature of 98.3  F, heart rate 95, respiratory rate 24, blood pressure 118/93, SpO2 99% on room air.  Initial lab work showed proBNP 4808, high-sensitivity troponin 21, remaining CBC and BMP within normal limits.  EKG showed sinus rhythm with possible left atrial enlargement and left bundle branch block.  Chest x-ray showed cardiac enlargement with bilateral pulmonary venous congestion and small right pleural effusion.  Bilateral lower extremity venous duplex Doppler were negative for DVT.  The patient was started on IV Lasix.  Echocardiogram showed ejection fraction 22% with severe global hypokinesia of the left ventricle and moderately dilated right atrium and mild mitral valve regurgitation.  Cardiology was consulted to see the patient.  Patient was started on Toprol, losartan, rosuvastatin, and Jardiance.  Coronary angiogram on 11/20/2024 showed mild atheromatous changes and EF of 25%.  The patient was fitted for a LifeVest prior to discharge and seen by cardiac rehab.      Today patient presents to the clinic for hospital discharge follow-up regarding his acute heart failure And nonischemic cardiomyopathy.  Patient is currently wearing his LifeVest and will start cardiac rehab tomorrow.  Patient states that he is feeling improved however he does still have some shortness of breath.  I have that he is eating and drinking okay without complication.  Using the restroom as normal 2.  He has follow-up appointment scheduled with cardiology.  He will also undergo a cardiac MRI in the next week or 2.    Hospital F/U (For ischemic - cardiomyopathy 11/18 @ [facility].)        12/3/2024     3:23 PM   Additional Questions   Roomed by Ximena Cavazos MA   Accompanied by Self         12/3/2024     3:23 PM   Patient Reported Additional  Medications   Patient reports taking the following new medications no     HPI       Hospital Follow-up Visit:    Hospital/Nursing Home/IP Rehab Facility: [facility]  Date of Admission: 11/18/24  Date of Discharge: 11/24/24  Reason(s) for Admission: non-ischemic cardiomyopathy  Was the patient in the ICU or did the patient experience delirium during hospitalization?  No  Do you have any other stressors you would like to discuss with your provider? Health Concerns    Problems taking medications regularly:  None  Medication changes since discharge: None  Problems adhering to non-medication therapy:  None    Summary of hospitalization:  [facility] discharge summary reviewed  Diagnostic Tests/Treatments reviewed.  Follow up needed: Cardiac MRI and Cardiology appt with cardiac rehab   Other Healthcare Providers Involved in Patient s Care:         None  Update since discharge: improved.         Plan of care communicated with patient                 Review of Systems        Objective    BP 92/63 (BP Location: Right arm, Patient Position: Sitting, Cuff Size: Adult Regular)   Pulse 72   Temp 97.3  F (36.3  C) (Oral)   Resp 16   Wt 87.2 kg (192 lb 4.8 oz)   SpO2 99%   BMI 27.59 kg/m    Body mass index is 27.59 kg/m .  Physical Exam   GENERAL: alert and no distress  NECK: no adenopathy, no asymmetry, masses, or scars  RESP: lungs clear to auscultation - no rales, rhonchi or wheezes  CV: regular rate and rhythm, normal S1 S2, no S3 or S4, no murmur, click or rub, no peripheral edema. LifeVest in place   ABDOMEN: soft, nontender, no hepatosplenomegaly, no masses and bowel sounds normal  MS: no gross musculoskeletal defects noted, no edema            Signed Electronically by: FRANCISCO Castillo CNP

## 2024-12-04 LAB
ANION GAP SERPL CALCULATED.3IONS-SCNC: 10 MMOL/L (ref 7–15)
BUN SERPL-MCNC: 13.9 MG/DL (ref 6–20)
CALCIUM SERPL-MCNC: 9.8 MG/DL (ref 8.8–10.4)
CHLORIDE SERPL-SCNC: 101 MMOL/L (ref 98–107)
CREAT SERPL-MCNC: 1.02 MG/DL (ref 0.67–1.17)
EGFRCR SERPLBLD CKD-EPI 2021: 85 ML/MIN/1.73M2
GLUCOSE SERPL-MCNC: 82 MG/DL (ref 70–99)
HCO3 SERPL-SCNC: 27 MMOL/L (ref 22–29)
POTASSIUM SERPL-SCNC: 4.5 MMOL/L (ref 3.4–5.3)
SODIUM SERPL-SCNC: 138 MMOL/L (ref 135–145)

## 2024-12-06 ENCOUNTER — HOSPITAL ENCOUNTER (OUTPATIENT)
Dept: CARDIAC REHAB | Facility: CLINIC | Age: 59
Discharge: HOME | End: 2024-12-06
Attending: INTERNAL MEDICINE
Payer: COMMERCIAL

## 2024-12-06 DIAGNOSIS — I42.8 NON-ISCHEMIC CARDIOMYOPATHY (H): ICD-10-CM

## 2024-12-06 PROCEDURE — 93798 PHYS/QHP OP CAR RHAB W/ECG: CPT

## 2024-12-06 PROCEDURE — 93797 PHYS/QHP OP CAR RHAB WO ECG: CPT

## 2024-12-09 ENCOUNTER — HOSPITAL ENCOUNTER (OUTPATIENT)
Dept: CARDIAC REHAB | Facility: CLINIC | Age: 59
Discharge: HOME | End: 2024-12-09
Attending: INTERNAL MEDICINE
Payer: COMMERCIAL

## 2024-12-09 PROCEDURE — 93798 PHYS/QHP OP CAR RHAB W/ECG: CPT

## 2024-12-11 ENCOUNTER — TELEPHONE (OUTPATIENT)
Dept: CARDIOLOGY | Facility: CLINIC | Age: 59
End: 2024-12-11
Payer: COMMERCIAL

## 2024-12-11 ENCOUNTER — TELEPHONE (OUTPATIENT)
Dept: CARDIAC REHAB | Facility: CLINIC | Age: 59
End: 2024-12-11
Payer: COMMERCIAL

## 2024-12-11 ENCOUNTER — HOSPITAL ENCOUNTER (OUTPATIENT)
Dept: CARDIAC REHAB | Facility: CLINIC | Age: 59
Discharge: HOME | End: 2024-12-11
Attending: INTERNAL MEDICINE
Payer: COMMERCIAL

## 2024-12-11 PROCEDURE — 93798 PHYS/QHP OP CAR RHAB W/ECG: CPT | Performed by: REHABILITATION PRACTITIONER

## 2024-12-11 NOTE — TELEPHONE ENCOUNTER
Dr Richardson,  Your patient Harshil has attended 3 sessions of OPCR.  His BPs are on the low side of normal. SBP .   HRs 80s at rest. Mid 100s with exercise up to 3 METS.    He had episodes of lightheadedness when his BP is in the 80s. While in rehab.  At home he has experienced intermittent symptoms of lightheadedness throughout the day.  We discussed that rehab at 7 am is the perfect storm for low Bps as he takes his AM medications, little to no food and water before a 7 am workout and exercise that will lower his BP post session.  I did say he could take his medications after his rehab session however to discuss this with your team.  I am concerned that post rehab session he might continue to drop his BPs and be at risk for fainting when he leaves our clinic.  Please advise.  You can see his sessions and rhythm strips in EPIC     Thank you,   Loren Nicholas

## 2024-12-11 NOTE — TELEPHONE ENCOUNTER
"Patient was hospitalized at [facility] 11/18/24 - 11/21/24. Underwent Coronary Angiogram on 11/20/24 without intervention.    Per Dr. Richardson's Cardiology consult note on 11/19/24:    Assessment & Plan  # Acute HFrEF LVEF 20-25%, new dx, chronicity unknown, etiology unknown.  He denies symptoms concerning for recent ischemic event.  Father passed away from \"heart attack\" in his 40s, details unclear.  # Remote history of paroxysmal atrial fibrillation   # Chronic LBBB, would need to consider CRT-D if appropriate      -Reviewed prior cardiac history in detail  - Discussed options for further evaluation and management including non-invasive stress testing, or cardiac catheterization/coronary angiography +/- PCI.  Reviewed the risks and benefits of each option at length.   - Recommend cardiac catheterization/coronary angiography +/- PCI.   - Discussed the risks of cardiac catheterization/angiography +/- PCI that include but are not limited to the risk of stroke, heart attack, death, cardiac injury, emergent intervention such as stenting or bypass, contrast induced allergic reaction, renal dysfunction (including risks of temporary or permanent dialysis), and complications related to sedation and respiratory/pulmonary compromise, vascular complications (including bleeding and blood transfusion). Patient denies any major active bleeding issues and is willing to take and comply with dual antiplatelet therapy and understands the associated bleeding risks. Patient understands the overall risks of the procedure and wishes to proceed.  - N.p.o. at midnight  - Agree with continued volume optimization with goal net -2 to 3 L over 24 hours, though seems close to euvolemic clinically, feels at baseline after diuresis  - Will start low-dose Entresto tonight  - Will start low-dose metoprolol succinate  - Anticipate starting Jardiance tomorrow  - Will plan on low-dose aldosterone antagonist in the outpatient setting  - Added on inflammation " markers, though troponin normal and so myocarditis is less likely   - Discussed the issues of anticoagulation for prevention of stroke given history of atrial fibrillation, [id number] at least 1, discussed risks/benefits, after discussion patient would like to start anticoagulation.  No hx of abnormal bleeding.  Will plan on apixaban after cardiac catheterization  - If coronary angiogram is unrevealing, will plan an outpatient cardiac MRI  - Cardiac telemetry.  Monitor electrolytes daily, maintain potassium greater than 4, magnesium greater than 2  - LifeVest on discharge   - Cardiology will follow    Encounter routed to Dr. Richardson for review.

## 2024-12-12 NOTE — TELEPHONE ENCOUNTER
Chaparro Richardson MD  [facility] Heart Team 2  Cc: Loren Nicholas, EP  Caller: Unspecified (Yesterday,  8:27 AM)  Thanks for the heads up  Team 2 Recommend he trial taking the losartan after lunch given the hypotension, and should monitor if systolic BPs are consistently <100 mmHg then should stop this and let us know  thanks      Spoke to patient, reviewed message from Dr Richardson. He expressed understanding.

## 2024-12-13 ENCOUNTER — HOSPITAL ENCOUNTER (OUTPATIENT)
Dept: CARDIAC REHAB | Facility: CLINIC | Age: 59
Discharge: HOME | End: 2024-12-13
Attending: INTERNAL MEDICINE
Payer: COMMERCIAL

## 2024-12-13 PROCEDURE — 93798 PHYS/QHP OP CAR RHAB W/ECG: CPT

## 2024-12-16 ENCOUNTER — MYC MEDICAL ADVICE (OUTPATIENT)
Dept: CARDIOLOGY | Facility: CLINIC | Age: 59
End: 2024-12-16
Payer: COMMERCIAL

## 2024-12-16 ENCOUNTER — HOSPITAL ENCOUNTER (OUTPATIENT)
Dept: CARDIAC REHAB | Facility: CLINIC | Age: 59
Discharge: HOME | End: 2024-12-16
Attending: INTERNAL MEDICINE
Payer: COMMERCIAL

## 2024-12-16 DIAGNOSIS — I42.8 NON-ISCHEMIC CARDIOMYOPATHY (H): ICD-10-CM

## 2024-12-16 PROCEDURE — 93798 PHYS/QHP OP CAR RHAB W/ECG: CPT | Performed by: REHABILITATION PRACTITIONER

## 2024-12-16 RX ORDER — LOSARTAN POTASSIUM 25 MG/1
12.5 TABLET ORAL DAILY
Qty: 45 TABLET | Refills: 1 | Status: SHIPPED | OUTPATIENT
Start: 2024-12-16

## 2024-12-16 RX ORDER — METOPROLOL SUCCINATE 25 MG/1
25 TABLET, EXTENDED RELEASE ORAL DAILY
Qty: 90 TABLET | Refills: 1 | Status: SHIPPED | OUTPATIENT
Start: 2024-12-16

## 2024-12-16 RX ORDER — ROSUVASTATIN CALCIUM 20 MG/1
20 TABLET, COATED ORAL AT BEDTIME
Qty: 90 TABLET | Refills: 3 | Status: SHIPPED | OUTPATIENT
Start: 2024-12-16

## 2024-12-16 NOTE — TELEPHONE ENCOUNTER
[facility] C.O.R.E[facility] Clinic     Pt called requesting refills on:  Apixaban 5 mg BID  Empagliflozin 10 mg daily  Metoprolol succinate 25 mg daily  Rosuvastatin 20 mg daily  Losartan 12.5 mg daily    Pt requested refills to be sent to [facility] per request.     Refills sent    [facility] Cardiology Refill Guideline reviewed.  Medication meets criteria for refill.        Future Appointments   Date Time Provider Department Center   12/18/2024  7:00 AM 2, Rh Cardiac Rehab [facility]   12/19/2024 12:15 PM [id number] [facility] CV[facility]   12/20/2024  7:00 AM 2, Rh Cardiac Rehab [facility]   12/23/2024  7:00 AM 2, Rh Cardiac Rehab [facility]   12/24/2024  8:00 AM  [facility]   12/24/2024  8:40 AM Jacque Segovia PA-C [facility]   12/27/2024  7:00 AM 2, Rh Cardiac Rehab [facility]   12/30/2024  7:00 AM 2, Rh Cardiac Rehab [facility]   1/3/2025  7:00 AM 2, Rh Cardiac Rehab [facility]   1/6/2025  7:00 AM 2, Rh Cardiac Rehab [facility]   1/6/2025  4:00 PM Delicia Kc PA-C [facility]   1/8/2025  7:00 AM 2, Rh Cardiac Rehab [facility]   1/10/2025  7:00 AM 2, Rh Cardiac Rehab [facility]   1/10/2025 12:30 PM Johanne Ulloa [facility]   1/10/2025  1:45 PM  LAB [facility]   1/13/2025  7:00 AM 2, Rh Cardiac Rehab [facility]   1/13/2025  9:30 AM Han Fuller MD [facility]   1/15/2025  7:00 AM 2, Rh Cardiac Rehab [facility]   1/15/2025  8:00 AM RH DIETICIAN CARDIAC REHAB [facility]   1/17/2025  7:00 AM 2, Rh Cardiac Rehab [facility]   1/20/2025  7:00 AM 2, Rh Cardiac Rehab [facility]   1/22/2025  7:00 AM 2, Rh Cardiac Rehab [facility]   1/24/2025  7:00 AM 2, Rh Cardiac Rehab [facility]   1/27/2025  7:00 AM 2, Rh Cardiac Rehab [facility]   1/29/2025  7:00 AM 2, Rh Cardiac Rehab [facility]   1/31/2025  7:00 AM 2, Rh Cardiac Rehab [facility]    2/26/2025  8:30 AM [email or web address] [facility]CVCC RSCC   3/6/2025  1:15 PM Chaparro Richardson MD [facility] PSA CLIN   3/7/2025  2:30 PM Karla Delgado, RD, LD [facility]     JEREMIAH Rios, RN 4:30 PM 12/16/24

## 2024-12-18 ENCOUNTER — HOSPITAL ENCOUNTER (OUTPATIENT)
Dept: CARDIAC REHAB | Facility: CLINIC | Age: 59
Discharge: HOME | End: 2024-12-18
Attending: INTERNAL MEDICINE
Payer: COMMERCIAL

## 2024-12-18 PROCEDURE — 93798 PHYS/QHP OP CAR RHAB W/ECG: CPT | Performed by: REHABILITATION PRACTITIONER

## 2024-12-19 ENCOUNTER — HOSPITAL ENCOUNTER (OUTPATIENT)
Dept: CARDIOLOGY | Facility: CLINIC | Age: 59
Discharge: HOME | End: 2024-12-19
Attending: INTERNAL MEDICINE
Payer: COMMERCIAL

## 2024-12-19 VITALS — SYSTOLIC BLOOD PRESSURE: 108 MMHG | HEART RATE: 64 BPM | DIASTOLIC BLOOD PRESSURE: 72 MMHG

## 2024-12-19 DIAGNOSIS — I50.9 ACUTE CONGESTIVE HEART FAILURE, UNSPECIFIED HEART FAILURE TYPE (H): ICD-10-CM

## 2024-12-19 DIAGNOSIS — I42.8 NON-ISCHEMIC CARDIOMYOPATHY (H): ICD-10-CM

## 2024-12-19 PROCEDURE — 75561 CARDIAC MRI FOR MORPH W/DYE: CPT

## 2024-12-19 RX ORDER — LORAZEPAM 0.5 MG/1
0.5 TABLET ORAL EVERY 30 MIN PRN
Status: ACTIVE | OUTPATIENT
Start: 2024-12-19

## 2024-12-19 RX ORDER — GADOBUTROL 604.72 MG/ML
11 INJECTION INTRAVENOUS ONCE
Status: ACTIVE | OUTPATIENT
Start: 2024-12-19

## 2024-12-19 RX ORDER — LIDOCAINE 40 MG/G
CREAM TOPICAL
OUTPATIENT
Start: 2024-12-19

## 2024-12-19 RX ORDER — NITROGLYCERIN 0.4 MG/1
0.4 TABLET SUBLINGUAL EVERY 5 MIN PRN
Status: ACTIVE | OUTPATIENT
Start: 2024-12-19 | End: 2024-12-19

## 2024-12-19 RX ORDER — DIAZEPAM 5 MG/1
5 TABLET ORAL EVERY 30 MIN PRN
Status: ACTIVE | OUTPATIENT
Start: 2024-12-19

## 2024-12-20 ENCOUNTER — HOSPITAL ENCOUNTER (OUTPATIENT)
Dept: CARDIAC REHAB | Facility: CLINIC | Age: 59
Discharge: HOME | End: 2024-12-20
Attending: INTERNAL MEDICINE
Payer: COMMERCIAL

## 2024-12-20 PROCEDURE — 93798 PHYS/QHP OP CAR RHAB W/ECG: CPT

## 2024-12-23 ENCOUNTER — HOSPITAL ENCOUNTER (OUTPATIENT)
Dept: CARDIAC REHAB | Facility: CLINIC | Age: 59
Discharge: HOME | End: 2024-12-23
Attending: INTERNAL MEDICINE
Payer: COMMERCIAL

## 2024-12-23 ENCOUNTER — TELEPHONE (OUTPATIENT)
Dept: CARDIOLOGY | Facility: CLINIC | Age: 59
End: 2024-12-23
Payer: COMMERCIAL

## 2024-12-23 DIAGNOSIS — I42.8 NON-ISCHEMIC CARDIOMYOPATHY (H): ICD-10-CM

## 2024-12-23 DIAGNOSIS — I50.22 CHRONIC SYSTOLIC CONGESTIVE HEART FAILURE (H): Primary | ICD-10-CM

## 2024-12-23 PROCEDURE — 93798 PHYS/QHP OP CAR RHAB W/ECG: CPT | Performed by: REHABILITATION PRACTITIONER

## 2024-12-23 NOTE — PROGRESS NOTES
[facility]   Order placed for BMP to be drawn on 12/24/24 for CORE OV per CORE Recommendations.     Future Appointments   Date Time Provider Department Center   12/24/2024  8:00 AM  LAB [facility] [place] [facility]   12/24/2024  8:40 AM Jacque Segovia PA-C [facility]   12/27/2024  7:00 AM 2, Rh Cardiac Rehab [facility]   12/30/2024  7:00 AM 2, Rh Cardiac Rehab [facility]   1/3/2025  7:00 AM 2, Rh Cardiac Rehab [facility]   1/6/2025  7:00 AM 2, Rh Cardiac Rehab [facility]   1/6/2025  4:00 PM Delicia Kc PA-C [facility]   1/8/2025  7:00 AM 2, Rh Cardiac Rehab [facility]   1/10/2025  7:00 AM 2, Rh Cardiac Rehab [facility]   1/10/2025 12:30 PM [facility]   1/10/2025  1:45 PM  LAB [facility]   1/13/2025  7:00 AM 2, Rh Cardiac Rehab [facility]   1/13/2025  9:30 AM Han Fuller MD [facility]   1/15/2025  7:00 AM 2, Rh Cardiac Rehab [facility]   1/15/2025  8:00 AM RH DIETICIAN CARDIAC REHAB [facility]   1/17/2025  7:00 AM 2, Rh Cardiac Rehab [facility]   1/20/2025  7:00 AM 2, Rh Cardiac Rehab [facility]   1/20/2025  9:00 AM 1, Rh Cardiac Rehab [facility]   1/22/2025  7:00 AM 2, Rh Cardiac Rehab [facility]   1/24/2025  7:00 AM 2, Rh Cardiac Rehab [facility]   1/27/2025  7:00 AM 2, Rh Cardiac Rehab [facility]   1/29/2025  7:00 AM 2[facility]   1/31/2025  7:00 AM 2, [facility]   2/26/2025  8:30 AM [email or web address] RHCVCC RSCC   3/6/2025  1:15 PM Chaparro Richardson MD [facility] PSA CLIN   3/7/2025  2:30 PM Karla Delgado, RD, LD [facility]     JEREMIAH Rios, RN 3:14 PM 12/23/24    JEREMIAH Rios, RN 3:14 PM 12/23/24     Well appearing, non-toxic.  NCAT  Neck supple without meningismus,  CTA b/l, no wheeze, rales, rhonchi  RRR, (+)S1S2, no MRG  Abd soft, NT, ND, no guarding, no rebound.   - (+) swelling of right testicle, no redness.  (+) tender hemiscrotum on right, absent cremasteric reflex.  Skin - warm, well perfused, no rash.

## 2024-12-23 NOTE — TELEPHONE ENCOUNTER
Regarding Cardiac MRI results:  ----- Message from Chaparro Richardson sent at 12/23/2024  8:00 AM CST -----  Recommend referral to Genetics counseling for non ischemic cardiomyopathy     =========================================    Patient called and informed that Dr. Richardson has reviewed the results of cardiac MRI and of above recommendation. Patient is already scheduled with genetic counseling in January. Patient has many questions and plans to review at office visit tomorrow with CASE Jacque Segovia.

## 2024-12-24 ENCOUNTER — LAB (OUTPATIENT)
Dept: LAB | Facility: CLINIC | Age: 59
End: 2024-12-24
Payer: COMMERCIAL

## 2024-12-24 DIAGNOSIS — I42.8 NON-ISCHEMIC CARDIOMYOPATHY (H): ICD-10-CM

## 2024-12-24 DIAGNOSIS — I50.22 CHRONIC SYSTOLIC CONGESTIVE HEART FAILURE (H): ICD-10-CM

## 2024-12-24 LAB
ANION GAP SERPL CALCULATED.3IONS-SCNC: 10 MMOL/L (ref 7–15)
BUN SERPL-MCNC: 16.4 MG/DL (ref 6–20)
CALCIUM SERPL-MCNC: 9.6 MG/DL (ref 8.8–10.4)
CHLORIDE SERPL-SCNC: 105 MMOL/L (ref 98–107)
CREAT SERPL-MCNC: 1.1 MG/DL (ref 0.67–1.17)
EGFRCR SERPLBLD CKD-EPI 2021: 78 ML/MIN/1.73M2
GLUCOSE SERPL-MCNC: 99 MG/DL (ref 70–99)
HCO3 SERPL-SCNC: 27 MMOL/L (ref 22–29)
POTASSIUM SERPL-SCNC: 4.2 MMOL/L (ref 3.4–5.3)
SODIUM SERPL-SCNC: 142 MMOL/L (ref 135–145)

## 2024-12-27 ENCOUNTER — HOSPITAL ENCOUNTER (OUTPATIENT)
Dept: CARDIAC REHAB | Facility: CLINIC | Age: 59
Discharge: HOME | End: 2024-12-27
Attending: INTERNAL MEDICINE
Payer: COMMERCIAL

## 2024-12-27 PROCEDURE — 93798 PHYS/QHP OP CAR RHAB W/ECG: CPT

## 2024-12-30 ENCOUNTER — HOSPITAL ENCOUNTER (OUTPATIENT)
Dept: CARDIAC REHAB | Facility: CLINIC | Age: 59
Discharge: HOME | End: 2024-12-30
Attending: INTERNAL MEDICINE
Payer: COMMERCIAL

## 2024-12-30 PROCEDURE — 93798 PHYS/QHP OP CAR RHAB W/ECG: CPT

## 2025-01-03 ENCOUNTER — HOSPITAL ENCOUNTER (OUTPATIENT)
Dept: CARDIAC REHAB | Facility: CLINIC | Age: 60
Discharge: HOME | End: 2025-01-03
Attending: INTERNAL MEDICINE
Payer: COMMERCIAL

## 2025-01-03 PROCEDURE — 93798 PHYS/QHP OP CAR RHAB W/ECG: CPT

## 2025-01-06 ENCOUNTER — OFFICE VISIT (OUTPATIENT)
Dept: CARDIOLOGY | Facility: CLINIC | Age: 60
End: 2025-01-06
Payer: COMMERCIAL

## 2025-01-06 ENCOUNTER — HOSPITAL ENCOUNTER (OUTPATIENT)
Dept: CARDIAC REHAB | Facility: CLINIC | Age: 60
Discharge: HOME | End: 2025-01-06
Attending: INTERNAL MEDICINE
Payer: COMMERCIAL

## 2025-01-06 VITALS
HEART RATE: 76 BPM | WEIGHT: 187.9 LBS | RESPIRATION RATE: 16 BRPM | SYSTOLIC BLOOD PRESSURE: 101 MMHG | BODY MASS INDEX: 26.96 KG/M2 | DIASTOLIC BLOOD PRESSURE: 68 MMHG | OXYGEN SATURATION: 100 %

## 2025-01-06 DIAGNOSIS — I25.10 CORONARY ARTERY DISEASE INVOLVING NATIVE CORONARY ARTERY OF NATIVE HEART WITHOUT ANGINA PECTORIS: Primary | ICD-10-CM

## 2025-01-06 DIAGNOSIS — I42.8 NON-ISCHEMIC CARDIOMYOPATHY (H): ICD-10-CM

## 2025-01-06 PROCEDURE — 93798 PHYS/QHP OP CAR RHAB W/ECG: CPT | Performed by: REHABILITATION PRACTITIONER

## 2025-01-06 PROCEDURE — 99417 PROLNG OP E/M EACH 15 MIN: CPT | Performed by: PHYSICIAN ASSISTANT

## 2025-01-06 PROCEDURE — 99215 OFFICE O/P EST HI 40 MIN: CPT | Performed by: PHYSICIAN ASSISTANT

## 2025-01-06 RX ORDER — LOSARTAN POTASSIUM 25 MG/1
12.5 TABLET ORAL 2 TIMES DAILY
COMMUNITY
Start: 2025-01-06

## 2025-01-06 NOTE — PROGRESS NOTES
Cardiology C.O.R.E (heart failure specialty) Clinic Progress Note    Clement Robert MRN# [id number]   YOB: 1965 Age: 59 year old     Primary cardiology team: Dr. Richardson         Assessment and Plan     In summary, Clement Robert presents today for a CORE clinic follow up visit.     HFrEF, severely dilated CMP  EF: 22% (11/2024 TTE); 17% on 12/2024 cMRI.  ACC/AHA stage C; FC II  Etiology: unclear; nonischemic; +FH of SCD in father (40's) -  consultation scheduled 1/10.   Valves: mild MR  Volume: Appears well-compensated  GDMT: jardiance 10, Losartan 12.5, Toprol 25.  QRS: Wide - LBBB  Device: None - Lifevest in place  Code status: Full  Creatinine: Normal  CHF admissions: 11/2024    PAF.  Isolated episode, none documented since 2011.  Now anticoagulated for a [id number] of 1 per Dr. Richardson.    Plan:  BP remains marginal. Will cautiously attempt to increase Losartan to 12.5 mg BID (afternoon and bedtime). Continue monitoring at cardiac rehab. Call with lightheadedness.     Follow-up:  In one month with me.  As scheduled with Dr. Richardson in February with TTE prior. Consider CRT-D if no significant EF improvement.      Delicia Kc PA-C  [facility]         History of Presenting Illness     Clement Robert is a pleasant 59 year old patient with a pertinent history of the following -   PAF.  Diagnosed in 2011 after being hit by hockey puck and sent to ED. Underwent cardioversion.  EF normal at that time.  Recently started on anticoagulation with Eliquis. [id number] sore is 1.  NICMP  Pt was lost to follow-up after 2011 until hospitalization in November 2024, with progressive dyspnea and intermittent palpitations, found to have a severely reduced ejection fraction of 20% with global hypokinesis and severe dilation with an LVIDD of 7.7 cm, normal RV function, mild MR. Trops normal.   Diuresed, placed on Toprol 25, Losartan 12.5, jardiance 10, no diuretics. Discharge wt 190  "lbs.  Given concerns for ventricular ectopy and family history of sudden cardiac death, LifeVest was placed on discharge.  Cardiac MRI was completed 12/19/2024 that revealed severe nonischemic dilated cardiomyopathy with LVEF 17%.  There was no late enhancement to suggest prior infarct, inflammation, or infiltration.   LBBB, newly noted in 2020. Stress nuclear study at that time showed EF 56%, no ischemia.  Very mild CAD on 11/2024 angiogram.  +FH of SCD father dying from \"heart attack\" in his 40's.   Seizure disorder, migraines, anxiety.  No significant alcohol use. No illicit drug use.     Today, Harshil returns to clinic stating he's feeling generally well, still of course with a functional capacity below his baseline (overdid it putting away melinda decorations this weekend) but continuing to improve and participating in cardiac rehab.  He denies chest pain, orthopnea, edema, claudication, palpitations, near syncope or syncope. Weighing daily, following strict sodium restriction. Remains understandably anxious, many appropriate questions today about diagnosis/prognosis/management.         Review of Systems     12-pt ROS is negative except for as noted in the HPI.          Physical Exam     Vitals: /68 (BP Location: Right arm, Patient Position: Sitting, Cuff Size: Adult Regular)   Pulse 76   Resp 16   Wt 85.2 kg (187 lb 14.4 oz)   SpO2 100%   BMI 26.96 kg/m    Wt Readings from Last 10 Encounters:   01/06/25 85.2 kg (187 lb 14.4 oz)   12/24/24 84.8 kg (187 lb)   12/03/24 87.2 kg (192 lb 4.8 oz)   11/26/24 88 kg (194 lb)   11/21/24 86.7 kg (191 lb 1.6 oz)   03/04/16 89.8 kg (198 lb)   11/18/11 86.2 kg (190 lb)       Constitutional:  Patient is pleasant, alert, cooperative, and in NAD.  HEENT:  NCAT. PERRLA. EOM's intact.   Neck:  CVP appears normal. No carotid bruits.   Pulmonary: Normal respiratory effort. CTAB.   Cardiac: RRR, normal S1/S2, no S3/S4, no murmur or rub.   Abdomen:  Non-tender abdomen, no " hepatosplenomegaly appreciated.   Vascular: Pulses in the upper and lower extremities are 2+ and equal bilaterally.  Extremities: No edema, erythema, cyanosis or tenderness appreciated.  Skin:  No rashes or lesions appreciated.   Neurological:  No gross motor or sensory deficits.   Psych: Appropriate affect.          Data   Labs reviewed:  Recent Labs   Lab Test 11/25/24  0830 11/19/24  0642 11/18/24  1441   LDL  --  145*  --    HDL  --  65  --    NHDL  --  154*  --    CHOL  --  219*  --    TRIG  --  47  --    TSH  --   --  4.12   NTBNP 1,063*  --   --        Lab Results   Component Value Date    WBC 4.7 11/25/2024    WBC 8.5 11/18/2011    RBC 5.21 11/25/2024    RBC 4.90 11/18/2011    HGB 15.3 11/25/2024    HGB 15.3 11/18/2011    HCT 45.7 11/25/2024    HCT 43.2 11/18/2011    MCV 88 11/25/2024    MCV 88 11/18/2011    MCH 29.4 11/25/2024    MCH 31.2 11/18/2011    MCHC 33.5 11/25/2024    MCHC 35.4 11/18/2011    RDW 11.8 11/25/2024    RDW 12.1 11/18/2011     11/25/2024     11/18/2011       Lab Results   Component Value Date     12/24/2024     11/18/2011    POTASSIUM 4.2 12/24/2024    POTASSIUM 4.0 11/18/2011    CHLORIDE 105 12/24/2024    CHLORIDE 104 11/18/2011    CO2 27 12/24/2024    CO2 26 11/18/2011    ANIONGAP 10 12/24/2024    ANIONGAP 7 11/18/2011    GLC 99 12/24/2024    GLC 96 11/18/2011    BUN 16.4 12/24/2024    BUN 12 11/18/2011    CR 1.10 12/24/2024    CR 0.74 11/18/2011    GFRESTIMATED 78 12/24/2024    GFRESTIMATED >90 11/18/2011    GFRESTBLACK >90 11/18/2011    JAMES 9.6 12/24/2024    JAMES 8.7 11/18/2011      Lab Results   Component Value Date    AST 22 11/25/2024    AST 41 07/29/2010    ALT 42 11/25/2024    ALT     34 11/28/2011    ALT 34 11/28/2011       Lab Results   Component Value Date    A1C 5.1 11/18/2024       Lab Results   Component Value Date    INR 0.99 11/18/2024            Problem List     Patient Active Problem List   Diagnosis    Seizures (H)    Anxiety    Epilepsy  without status epilepticus, not intractable, unspecified    Acute pharyngitis, unspecified etiology    LBBB (left bundle branch block)    Acute congestive heart failure, unspecified heart failure type (H)    Major depressive disorder, single episode, unspecified    Polyp of colon    Epilepsy, unspecified, not intractable, without status epilepticus (H)            Medications     Current Outpatient Medications   Medication Sig Dispense Refill    apixaban ANTICOAGULANT (ELIQUIS) 5 MG tablet Take 1 tablet (5 mg) by mouth 2 times daily. 180 tablet 3    busPIRone (BUSPAR) 5 MG tablet Take 10 mg by mouth at bedtime.      busPIRone (BUSPAR) 5 MG tablet Take 5 mg by mouth every morning.      empagliflozin (JARDIANCE) 10 MG TABS tablet Take 1 tablet (10 mg) by mouth daily. 90 tablet 1    levETIRAcetam (KEPPRA) 500 MG tablet Take 1,000 mg by mouth at bedtime.      levETIRAcetam (KEPPRA) 500 MG tablet Take 500 mg by mouth every morning.  6    losartan (COZAAR) 25 MG tablet Take 0.5 tablets (12.5 mg) by mouth daily. 45 tablet 1    metoprolol succinate ER (TOPROL XL) 25 MG 24 hr tablet Take 1 tablet (25 mg) by mouth daily. 90 tablet 1    nortriptyline (PAMELOR) 75 MG capsule TAKE 1 CAPSULE BY MOUTH AT BEDTIME*      rosuvastatin (CRESTOR) 20 MG tablet Take 1 tablet (20 mg) by mouth at bedtime. 90 tablet 3            Past Medical History     Past Medical History:   Diagnosis Date    Anxiety     Seizures (H)      Past Surgical History:   Procedure Laterality Date    CV CORONARY ANGIOGRAM N/A 11/20/2024    Procedure: Coronary Angiogram;  Surgeon: Dominick Das MD;  Location:  HEART CARDI[facility] CATH LAB     Family History   Problem Relation Age of Onset    Family History Negative Mother     Family History Negative Father      Social History     Socioeconomic History    Marital status:      Spouse name: Not on file    Number of children: Not on file    Years of education: Not on file    Highest education level: Not on  file   Occupational History    Not on file   Tobacco Use    Smoking status: Never    Smokeless tobacco: Never   Vaping Use    Vaping status: Never Used   Substance and Sexual Activity    Alcohol use: Yes     Comment: occ    Drug use: No    Sexual activity: Yes     Partners: Female   Other Topics Concern    Parent/sibling w/ CABG, MI or angioplasty before 65F 55M? Not Asked   Social History Narrative    Not on file     Social Drivers of Health     Financial Resource Strain: Low Risk  (11/19/2024)    Financial Resource Strain     Within the past 12 months, have you or your family members you live with been unable to get utilities (heat, electricity) when it was really needed?: No   Food Insecurity: Low Risk  (11/19/2024)    Food Insecurity     Within the past 12 months, did you worry that your food would run out before you got money to buy more?: No     Within the past 12 months, did the food you bought just not last and you didn t have money to get more?: No   Transportation Needs: Low Risk  (11/19/2024)    Transportation Needs     Within the past 12 months, has lack of transportation kept you from medical appointments, getting your medicines, non-medical meetings or appointments, work, or from getting things that you need?: No   Physical Activity: Not on file   Stress: Not on file   Social Connections: Socially Integrated (11/26/2023)    [facility]    Social Connections     Do you often feel lonely or isolated from those around you?: 0   Interpersonal Safety: Low Risk  (11/19/2024)    Interpersonal Safety     Do you feel physically and emotionally safe where you currently live?: Yes     Within the past 12 months, have you been hit, slapped, kicked or otherwise physically hurt by someone?: No     Within the past 12 months, have you been humiliated or emotionally abused in other ways by your partner or ex-partner?: No   Housing Stability: Low Risk  (11/19/2024)     Housing Stability     Do you have housing? : Yes     Are you worried about losing your housing?: No            Allergies   Patient has no known allergies.    Today's clinic visit entailed:  I spent a total of 60 minutes on the day of the visit.   Time spent by me today doing chart review, history and exam, documentation and further activities per the note  Provider  Link to MDM Help Grid     The level of medical decision making during this visit was of moderate complexity.    The longitudinal plan of care for the diagnosis(es)/condition(s) as documented were addressed during this visit. Due to the added complexity in care, I will continue to support Harshil in the subsequent management and with ongoing continuity of care.

## 2025-01-06 NOTE — LETTER
1/6/2025    [facility] [address] [place] [address]    RE: Clement Robert       Dear Colleague,     I had the pleasure of seeing Clement Robert in the [facility].    Cardiology C.O.R.E (heart failure specialty) Clinic Progress Note    Clement Robert MRN# [id number]   YOB: 1965 Age: 59 year old     Primary cardiology team: Dr. Richardson         Assessment and Plan     In summary, Clement Robert presents today for a CORE clinic follow up visit.     HFrEF, severely dilated CMP  EF: 22% (11/2024 TTE); 17% on 12/2024 cMRI.  ACC/AHA stage C; FC II  Etiology: unclear; nonischemic; +FH of SCD in father (40's) -  consultation scheduled 1/10.   Valves: mild MR  Volume: Appears well-compensated  GDMT: jardiance 10, Losartan 12.5, Toprol 25.  QRS: Wide - LBBB  Device: None - Lifevest in place  Code status: Full  Creatinine: Normal  CHF admissions: 11/2024    PAF.  Isolated episode, none documented since 2011.  Now anticoagulated for a [id number]-UHRd of 1 per Dr. Richardson.    Plan:  BP remains marginal. Will cautiously attempt to increase Losartan to 12.5 mg BID (afternoon and bedtime). Continue monitoring at cardiac rehab. Call with lightheadedness.     Follow-up:  In one month with me.  As scheduled with Dr. Richardson in February with TTE prior. Consider CRT-D if no significant EF improvement.      Delicia Kc PA-C  [facility]         History of Presenting Illness     Clement Robert is a pleasant 59 year old patient with a pertinent history of the following -   PAF.  Diagnosed in 2011 after being hit by hockey puck and sent to ED. Underwent cardioversion.  EF normal at that time.  Recently started on anticoagulation with Eliquis. [id number]-SCPt sore is 1.  NICMP  Pt was lost to follow-up after 2011 until hospitalization in November 2024, with progressive dyspnea and intermittent palpitations, found to have a severely reduced ejection  "fraction of 20% with global hypokinesis and severe dilation with an LVIDD of 7.7 cm, normal RV function, mild MR. Trops normal.   Diuresed, placed on Toprol 25, Losartan 12.5, jardiance 10, no diuretics. Discharge wt 190 lbs.  Given concerns for ventricular ectopy and family history of sudden cardiac death, LifeVest was placed on discharge.  Cardiac MRI was completed 12/19/2024 that revealed severe nonischemic dilated cardiomyopathy with LVEF 17%.  There was no late enhancement to suggest prior infarct, inflammation, or infiltration.   LBBB, newly noted in 2020. Stress nuclear study at that time showed EF 56%, no ischemia.  Very mild CAD on 11/2024 angiogram.  +FH of SCD father dying from \"heart attack\" in his 40's.   Seizure disorder, migraines, anxiety.  No significant alcohol use. No illicit drug use.     Today, Harshil returns to clinic stating he's feeling generally well, still of course with a functional capacity below his baseline (overdid it putting away melinda decorations this weekend) but continuing to improve and participating in cardiac rehab.  He denies chest pain, orthopnea, edema, claudication, palpitations, near syncope or syncope. Weighing daily, following strict sodium restriction. Remains understandably anxious, many appropriate questions today about diagnosis/prognosis/management.         Review of Systems     12-pt ROS is negative except for as noted in the HPI.          Physical Exam     Vitals: /68 (BP Location: Right arm, Patient Position: Sitting, Cuff Size: Adult Regular)   Pulse 76   Resp 16   Wt 85.2 kg (187 lb 14.4 oz)   SpO2 100%   BMI 26.96 kg/m    Wt Readings from Last 10 Encounters:   01/06/25 85.2 kg (187 lb 14.4 oz)   12/24/24 84.8 kg (187 lb)   12/03/24 87.2 kg (192 lb 4.8 oz)   11/26/24 88 kg (194 lb)   11/21/24 86.7 kg (191 lb 1.6 oz)   03/04/16 89.8 kg (198 lb)   11/18/11 86.2 kg (190 lb)       Constitutional:  Patient is pleasant, alert, cooperative, and in " NAD.  HEENT:  NCAT. PERRLA. EOM's intact.   Neck:  CVP appears normal. No carotid bruits.   Pulmonary: Normal respiratory effort. CTAB.   Cardiac: RRR, normal S1/S2, no S3/S4, no murmur or rub.   Abdomen:  Non-tender abdomen, no hepatosplenomegaly appreciated.   Vascular: Pulses in the upper and lower extremities are 2+ and equal bilaterally.  Extremities: No edema, erythema, cyanosis or tenderness appreciated.  Skin:  No rashes or lesions appreciated.   Neurological:  No gross motor or sensory deficits.   Psych: Appropriate affect.          Data   Labs reviewed:  Recent Labs   Lab Test 11/25/24  0830 11/19/24  0642 11/18/24  1441   LDL  --  145*  --    HDL  --  65  --    NHDL  --  154*  --    CHOL  --  219*  --    TRIG  --  47  --    TSH  --   --  4.12   NTBNP 1,063*  --   --        Lab Results   Component Value Date    WBC 4.7 11/25/2024    WBC 8.5 11/18/2011    RBC 5.21 11/25/2024    RBC 4.90 11/18/2011    HGB 15.3 11/25/2024    HGB 15.3 11/18/2011    HCT 45.7 11/25/2024    HCT 43.2 11/18/2011    MCV 88 11/25/2024    MCV 88 11/18/2011    MCH 29.4 11/25/2024    MCH 31.2 11/18/2011    MCHC 33.5 11/25/2024    MCHC 35.4 11/18/2011    RDW 11.8 11/25/2024    RDW 12.1 11/18/2011     11/25/2024     11/18/2011       Lab Results   Component Value Date     12/24/2024     11/18/2011    POTASSIUM 4.2 12/24/2024    POTASSIUM 4.0 11/18/2011    CHLORIDE 105 12/24/2024    CHLORIDE 104 11/18/2011    CO2 27 12/24/2024    CO2 26 11/18/2011    ANIONGAP 10 12/24/2024    ANIONGAP 7 11/18/2011    GLC 99 12/24/2024    GLC 96 11/18/2011    BUN 16.4 12/24/2024    BUN 12 11/18/2011    CR 1.10 12/24/2024    CR 0.74 11/18/2011    GFRESTIMATED 78 12/24/2024    GFRESTIMATED >90 11/18/2011    GFRESTBLACK >90 11/18/2011    JAMES 9.6 12/24/2024    JAEMS 8.7 11/18/2011      Lab Results   Component Value Date    AST 22 11/25/2024    AST 41 07/29/2010    ALT 42 11/25/2024    ALT     34 11/28/2011    ALT 34 11/28/2011       Lab  Results   Component Value Date    A1C 5.1 11/18/2024       Lab Results   Component Value Date    INR 0.99 11/18/2024            Problem List     Patient Active Problem List   Diagnosis     Seizures (H)     Anxiety     Epilepsy without status epilepticus, not intractable, unspecified     Acute pharyngitis, unspecified etiology     LBBB (left bundle branch block)     Acute congestive heart failure, unspecified heart failure type (H)     Major depressive disorder, single episode, unspecified     Polyp of colon     Epilepsy, unspecified, not intractable, without status epilepticus (H)            Medications     Current Outpatient Medications   Medication Sig Dispense Refill     apixaban ANTICOAGULANT (ELIQUIS) 5 MG tablet Take 1 tablet (5 mg) by mouth 2 times daily. 180 tablet 3     busPIRone (BUSPAR) 5 MG tablet Take 10 mg by mouth at bedtime.       busPIRone (BUSPAR) 5 MG tablet Take 5 mg by mouth every morning.       empagliflozin (JARDIANCE) 10 MG TABS tablet Take 1 tablet (10 mg) by mouth daily. 90 tablet 1     levETIRAcetam (KEPPRA) 500 MG tablet Take 1,000 mg by mouth at bedtime.       levETIRAcetam (KEPPRA) 500 MG tablet Take 500 mg by mouth every morning.  6     losartan (COZAAR) 25 MG tablet Take 0.5 tablets (12.5 mg) by mouth daily. 45 tablet 1     metoprolol succinate ER (TOPROL XL) 25 MG 24 hr tablet Take 1 tablet (25 mg) by mouth daily. 90 tablet 1     nortriptyline (PAMELOR) 75 MG capsule TAKE 1 CAPSULE BY MOUTH AT BEDTIME*       rosuvastatin (CRESTOR) 20 MG tablet Take 1 tablet (20 mg) by mouth at bedtime. 90 tablet 3            Past Medical History     Past Medical History:   Diagnosis Date     Anxiety      Seizures (H)      Past Surgical History:   Procedure Laterality Date     CV CORONARY ANGIOGRAM N/A 11/20/2024    Procedure: Coronary Angiogram;  Surgeon: Dominick Das MD;  Location:  HEART CARDIAC CATH LAB     Family History   Problem Relation Age of Onset     Family History Negative  Mother      Family History Negative Father      Social History     Socioeconomic History     Marital status:      Spouse name: Not on file     Number of children: Not on file     Years of education: Not on file     Highest education level: Not on file   Occupational History     Not on file   Tobacco Use     Smoking status: Never     Smokeless tobacco: Never   Vaping Use     Vaping status: Never Used   Substance and Sexual Activity     Alcohol use: Yes     Comment: occ     Drug use: No     Sexual activity: Yes     Partners: Female   Other Topics Concern     Parent/sibling w/ CABG, MI or angioplasty before 65F 55M? Not Asked   Social History Narrative     Not on file     Social Drivers of Health     Financial Resource Strain: Low Risk  (11/19/2024)    Financial Resource Strain      Within the past 12 months, have you or your family members you live with been unable to get utilities (heat, electricity) when it was really needed?: No   Food Insecurity: Low Risk  (11/19/2024)    Food Insecurity      Within the past 12 months, did you worry that your food would run out before you got money to buy more?: No      Within the past 12 months, did the food you bought just not last and you didn t have money to get more?: No   Transportation Needs: Low Risk  (11/19/2024)    Transportation Needs      Within the past 12 months, has lack of transportation kept you from medical appointments, getting your medicines, non-medical meetings or appointments, work, or from getting things that you need?: No   Physical Activity: Not on file   Stress: Not on file   Social Connections: Socially Integrated (11/26/2023)    Received [facility]    Social Connections      Do you often feel lonely or isolated from those around you?: 0   Interpersonal Safety: Low Risk  (11/19/2024)    Interpersonal Safety      Do you feel physically and emotionally safe where you currently live?: Yes      Within the past  12 months, have you been hit, slapped, kicked or otherwise physically hurt by someone?: No      Within the past 12 months, have you been humiliated or emotionally abused in other ways by your partner or ex-partner?: No   Housing Stability: Low Risk  (11/19/2024)    Housing Stability      Do you have housing? : Yes      Are you worried about losing your housing?: No            Allergies   Patient has no known allergies.    Today's clinic visit entailed:  I spent a total of 60 minutes on the day of the visit.   Time spent by me today doing chart review, history and exam, documentation and further activities per the note  Provider  Link to [facility] Help Grid     The level of medical decision making during this visit was of moderate complexity.    The longitudinal plan of care for the diagnosis(es)/condition(s) as documented were addressed during this visit. Due to the added complexity in care, I will continue to support Harshil in the subsequent management and with ongoing continuity of care.      Thank you for allowing me to participate in the care of your patient.      Sincerely,     Delicia Kc PA-C     [facility]  cc:   Delicia Kc PA-C  [address] [place] [id number]

## 2025-01-06 NOTE — PATIENT INSTRUCTIONS
Today, we discussed the following:  Medication changes:   INCREASE Losartan to 12.5 mg in the afternoon and at bedtime.   Follow up:   With me in early February with lab prior. Call in the meantime with lightheadedness.    Please, remember to continue the followin.  Weigh yourself daily. Call if your weight is up > than 2 pounds in one day, or 5 pounds in one week; if you feel more short of breath or have worsening swelling in your legs or abdomen.  2.  Eat a low sodium diet (less than 2,000mg or 2g daily). If you eat less salt, you will retain less fluid.   3.  Avoid alcohol, as this can worsen heart failure.   4.  Avoid NSAIDs as able (For example, Ibuprofen / aleve / advil / naprosen / diclofenac).    Thank you for your visit with the C.O.R.OLY. Clinic today.   CORE stands for Cardiomyopathy Optimization Rehabilitation and Education. The CORE clinic will teach and help you to manage your heart failure and keep you out of the hospital.    Call C.O.R.E. nurse for any questions or concerns Mon-Fri 8am-4pm  [phone]: Nurse number   [phone]: After Hours Phone Number

## 2025-01-07 ENCOUNTER — TELEPHONE (OUTPATIENT)
Dept: CARDIOLOGY | Facility: CLINIC | Age: 60
End: 2025-01-07
Payer: COMMERCIAL

## 2025-01-07 NOTE — TELEPHONE ENCOUNTER
Patient called requesting CPT codes to give to his insurance company. Writer sent letter to him via [employer] with appt info and CPT codes.

## 2025-01-08 ENCOUNTER — HOSPITAL ENCOUNTER (OUTPATIENT)
Dept: CARDIAC REHAB | Facility: CLINIC | Age: 60
Discharge: HOME | End: 2025-01-08
Attending: INTERNAL MEDICINE
Payer: COMMERCIAL

## 2025-01-08 PROCEDURE — 93798 PHYS/QHP OP CAR RHAB W/ECG: CPT

## 2025-01-09 ENCOUNTER — MYC MEDICAL ADVICE (OUTPATIENT)
Dept: CARDIOLOGY | Facility: CLINIC | Age: 60
End: 2025-01-09
Payer: COMMERCIAL

## 2025-01-09 NOTE — PROGRESS NOTES
Here is a copy of the progress note from your recent genetic counseling visit to the [facility] on Date: 1/10/2025.    PROGRESS NOTE: Clement was referred by Chaparro Richardson MD  for genetic counseling due to his history of nonischemic cardiomyopathy (NICM). I had the opportunity to talk with Clement today to discuss the genetic component of DCM and testing options available to him.     MEDICAL HISTORY: Harshil is a 59 year-old male with a history of a single episode of paroxysmal atrial fibrillation in , LBBB noted in , and nonischemic cardiomyopathy with LVEF 17% dx in . He was diagnosed with LBBB in  and had a stress nuclear study at that time which showed his EF to be 56% and no ischemia. He then presented to the hospital in 2024 with progressive dyspnea and intermittent palpitations. He had an echocardiogram which showed his LVEF to be 22%, severe global hypokinesia of the left ventricle, severe dilation of the left ventricle with an LVIDD of 7.7, and mild mitral regurgitation. He had a coronary angiogram which showed normal coronary arteries. He had a cardiac MRI on 24 which showed non-ischemic dilated cardiomyopathy with a LVEF of 17%. Due to his family history of SCD in his father and severe LV dysfunction, he had a LifeVest placed. The etiology of his NICM is unknown, but question has been raised whether it is due to LBBB or genetic.    FAMILY HISTORY: A detailed family history was obtained today and was significant for the following cardiac history:    Paternal history  Father,  suddenly at 44. He did not have an autopsy.  Two uncles and one aunt who passed in their 70's/80's, two had a history of CAD and bypass surgery.  Grandfather,  in his 80's from natural causes.  Grandmother,  in her 80's from a reported MI.  Maternal history  Mother, 88: in good health. She has a twin brother who is in good health.  Two uncles and two aunts  who have passed from non-cardiac issues.   Grandfather,  at an older age with emphysema.  Grandmother,  in her late 70's due to stroke complications.  Siblings  Sister, 65: in good health. She has not had cardiac screening.  Brother, 63: he had a stroke at 62.   Children  Four children, 18-24: the youngest has down syndrome. None have undergone cardiac screening.    There is no additional history of cardiomyopathy, arrhythmias, heart attacks, fainting, sudden cardiac death, genetic conditions, or birth defects. (Scanned pedigree may be under media tab)    DISCUSSION:  Non-ischemic cardiomyopathy results from causes other than loss of blood flow to the heart.  Cardiomyopathy can be caused by both acquired and genetic causes.  Acquired causes include exposure to toxins, injury related to coronary artery disease (ischemic), and chronic high blood pressure. When familial, it often results in dilated cardiomyopathy (DCM), where the left ventricle gets enlarged or stretched out.  There is a genetic basis found in 20-50% of DCM cases.      Reviewed autosomal dominant (AD) inheritance pattern most commonly associated with DCM, including the 50% risk for recurrence. Briefly reviewed autosomal recessive and X-linked inheritance. Explained that DCM gene mutations are associated with reduced penetrance and variable expressivity, meaning that individuals who carry a gene mutation may or may not get the disease and onset and severity can vary from one family member to the next. This explains why you may not see cardiomyopathy in each generation of a family.     Genetic testing is indicated for individuals with cardiomyopathy to better understand the cause of their heart disease, progression, the likelihood of noncardiac health risks, availability of gene therapy or enzyme replacement therapies, and risk to relatives. Genetic testing that includes a panel of genes is the most cost effective and timely approach. Reviewed  capabilities, limitations, and logistics of testing. Currently over 40 genes have been found to be related to DCM. Genetic testing currently identifies mutations in about 40% of idiopathic cases.     DNA sample via saliva or blood is collected and sent to testing lab for evaluation of selected genes. The results could directly impact care and treatment. This testing is medically necessary.     Explained three possible outcomes of genetic testing including: positive identification of a mutation, no mutation identified, and identification of a variant of unknown significance (VUS). If a mutation is identified, presymptomatic testing would be available to at risk family members. If no mutation is identified, it does not rule out the possibility of a genetic component to this disease. Family members could still be at risk for developing the same condition. If a VUS is identified, it is unclear if the mutation is disease causing or just a normal variation. It may take time and possibly additional testing to determine the meaning of a VUS result.     Test results take approximately 2-4 weeks on average. Discussed cost of testing through commercial labs. Explained that the lab works with insurance to determine coverage and will contact patient if out of pocket costs are expected to exceed $100. If so, patient has the option to pay reported price, cancel testing or elect self pay pricing (typcially around $250).     Discussed pros and cons of genetic testing. Explained that results could determine the cause for dilated cardiomyopathy. If a mutation is identified, presymptomatic testing is available to all at risk relatives. Reviewed possible issues associated with presymptomatic testing including genetic discrimination, current laws to prevent discrimination (ie. LIYA), insurance issues, and emotional and psychosocial outcomes of testing.     Explained that clinical evaluation is recommended for all first degree relatives  (parents, siblings, and children) of an affected individual regardless of decision to pursue genetic testing. Based on the Heart Failure Society of [place] Practice Guidelines (Dyana et al, 2018), clinical evaluation should be performed at least every 3-5 years beginning and childhood and should include history, cardiac exam, ECHO, and EKG.    All questions answered at this time.      PLAN: Clement elected to proceed with genetic testing.  Requisition and consent forms were completed and signed.  DNA will be collected via blood sample [facility]. I will contact patient when results are available.     TIME: I spent 55 minutes on the day of the encounter doing chart review, collecting medical and family history, counseling, documentation and further activities as noted above.    Johanne Ulloa MS, CGC  Licensed, Certified Genetic Counselor  [facility]  [facility]

## 2025-01-10 ENCOUNTER — LAB (OUTPATIENT)
Dept: LAB | Facility: CLINIC | Age: 60
End: 2025-01-10
Payer: COMMERCIAL

## 2025-01-10 ENCOUNTER — OFFICE VISIT (OUTPATIENT)
Dept: CARDIOLOGY | Facility: CLINIC | Age: 60
End: 2025-01-10
Payer: COMMERCIAL

## 2025-01-10 ENCOUNTER — HOSPITAL ENCOUNTER (OUTPATIENT)
Dept: CARDIAC REHAB | Facility: CLINIC | Age: 60
Discharge: HOME | End: 2025-01-10
Attending: INTERNAL MEDICINE
Payer: COMMERCIAL

## 2025-01-10 DIAGNOSIS — I42.8 NON-ISCHEMIC CARDIOMYOPATHY (H): ICD-10-CM

## 2025-01-10 DIAGNOSIS — I50.9 ACUTE CONGESTIVE HEART FAILURE, UNSPECIFIED HEART FAILURE TYPE (H): ICD-10-CM

## 2025-01-10 PROCEDURE — 99000 SPECIMEN HANDLING OFFICE-LAB: CPT | Performed by: PATHOLOGY

## 2025-01-10 PROCEDURE — 96041 GENETIC COUNSELING SVC EA 30: CPT

## 2025-01-10 PROCEDURE — 36415 COLL VENOUS BLD VENIPUNCTURE: CPT | Performed by: PATHOLOGY

## 2025-01-10 PROCEDURE — 93798 PHYS/QHP OP CAR RHAB W/ECG: CPT

## 2025-01-10 NOTE — TELEPHONE ENCOUNTER
"[facility] - C.O.R.E. Clinic    PMH: PAF after being hit by hockey puck in 2011 w/ DCCV (EF WNL at the time), NICMP w/ EF 20% (Nov 2024), global hypokinesis and severe dilation with an LVIDD of 7.7 cm, normal RV function, mild MR. Dayo placed on hospital discharge (11/21/24). LBBB (2020), very mild CAD, + FH of SCD with father dying from \"Heart attack\" in his 40's, seizure disorder, migraines, anxiety.     Chart Reviewed:  -- 12/19/24: Cardiac MRI.  severe nonischemic dilated cardiomyopathy with LVEF 17%. There was no late enhancement to suggest prior infarct, inflammation, or infiltration.   -- 1/6/25:  LOV w/ ELIEZER Keller in [facility]  Plan:  BP remains marginal. Will cautiously attempt to increase Losartan to 12.5 mg BID (afternoon and bedtime). Continue monitoring at cardiac rehab. Call with lightheadedness.      Follow-up:  In one month with me.  As scheduled with Dr. Richardson in February with TTE prior. Consider CRT-D if no significant EF improvement.    Future Appointments   Date Time Provider Department Center   1/10/2025 12:30 PM Johanne Ulloa GC [facility]   1/10/2025  1:45 PM  LAB [facility]   1/13/2025  7:00 AM 2, Rh Cardiac Rehab [facility]   1/13/2025  9:30 AM Han Fuller MD [facility]   1/15/2025  7:00 AM 2, Rh Cardiac Rehab [facility]   1/15/2025  8:00 AM RH DIETICIAN CARDIAC REHAB [facility]   1/17/2025  7:00 AM 2, Rh Cardiac Rehab [facility]   1/20/2025  7:00 AM 2, Rh Cardiac Rehab [facility]   1/20/2025  9:00 AM 1, Rh Cardiac Rehab [facility]   1/22/2025  7:00 AM 2, Rh Cardiac Rehab [facility]   1/24/2025  7:00 AM 2, Rh Cardiac Rehab [facility]   1/27/2025  7:00 AM 2, Rh Cardiac Rehab [facility]   1/29/2025  7:00 AM 2, Rh Cardiac Rehab [facility]   1/31/2025  7:00 AM 2, Rh Cardiac Rehab [facility]   2/3/2025  7:00 AM 2, Rh Cardiac Rehab [facility]   2/5/2025  7:00 AM 2, Rh Cardiac " Rehab [facility]   2/7/2025  7:00 AM 2, Rh Cardiac Rehab [facility]   2/10/2025  7:00 AM 2, Rh Cardiac Rehab [facility]   2/11/2025 12:00 PM [facility] [place] [facility]   2/11/2025 12:40 PM [facility]c [facility]   2/12/2025  7:00 AM 2, Rh Cardiac Rehab [facility]   2/14/2025  7:00 AM 2, Rh Cardiac Rehab [facility]   2/17/2025  7:00 AM 2, Rh Cardiac Rehab [facility]   2/18/2025  7:00 AM 1, Rh Cardiac Rehab [facility]   2/26/2025  8:30 AM [id number] [facility]   3/6/2025  1:15 PM Chaparro Richardson [facility]   3/7/2025  2:30 PM Karla Delgado, RD, LD [facility]     Roxie Hogan, YOHAN BSN   [facility] [place], [place]  C.O.R.E. Clinic Care Coordinator  01/10/25, 11:09 AM    [phone]

## 2025-01-10 NOTE — LETTER
1/10/2025      RE: Clement Robert  [address] [facility] [id number]       Dear Colleague,    Thank you for the opportunity to participate in the care of your patient, Clement Robert, at the [facility] [place] [facility]. Please see a copy of my visit note below.    Here is a copy of the progress note from your recent genetic counseling visit to the [facility] on Date: 1/10/2025.    PROGRESS NOTE: Clement was referred by Chaparro Richardson MD  for genetic counseling due to his history of nonischemic cardiomyopathy (NICM). I had the opportunity to talk with Clement today to discuss the genetic component of DCM and testing options available to him.     MEDICAL HISTORY: Harshil is a 59 year-old male with a history of a single episode of paroxysmal atrial fibrillation in , LBBB noted in , and nonischemic cardiomyopathy with LVEF 17% dx in . He was diagnosed with LBBB in  and had a stress nuclear study at that time which showed his EF to be 56% and no ischemia. He then presented to the hospital in 2024 with progressive dyspnea and intermittent palpitations. He had an echocardiogram which showed his LVEF to be 22%, severe global hypokinesia of the left ventricle, severe dilation of the left ventricle with an LVIDD of 7.7, and mild mitral regurgitation. He had a coronary angiogram which showed normal coronary arteries. He had a cardiac MRI on 24 which showed non-ischemic dilated cardiomyopathy with a LVEF of 17%. Due to his family history of SCD in his father and severe LV dysfunction, he had a LifeVest placed. The etiology of his NICM is unknown, but question has been raised whether it is due to LBBB or genetic.    FAMILY HISTORY: A detailed family history was obtained today and was significant for the following cardiac history:    Paternal history  Father,  suddenly at 44. He  did not have an autopsy.  Two uncles and one aunt who passed in their 70's/80's, two had a history of CAD and bypass surgery.  Grandfather,  in his 80's from natural causes.  Grandmother,  in her 80's from a reported MI.  Maternal history  Mother, 88: in good health. She has a twin brother who is in good health.  Two uncles and two aunts who have passed from non-cardiac issues.   Grandfather,  at an older age with emphysema.  Grandmother,  in her late 70's due to stroke complications.  Siblings  Sister, 65: in good health. She has not had cardiac screening.  Brother, 63: he had a stroke at 62.   Children  Four children, 18-24: the youngest has down syndrome. None have undergone cardiac screening.    There is no additional history of cardiomyopathy, arrhythmias, heart attacks, fainting, sudden cardiac death, genetic conditions, or birth defects. (Scanned pedigree may be under media tab)    DISCUSSION:  Non-ischemic cardiomyopathy results from causes other than loss of blood flow to the heart.  Cardiomyopathy can be caused by both acquired and genetic causes.  Acquired causes include exposure to toxins, injury related to coronary artery disease (ischemic), and chronic high blood pressure. When familial, it often results in dilated cardiomyopathy (DCM), where the left ventricle gets enlarged or stretched out.  There is a genetic basis found in 20-50% of DCM cases.      Reviewed autosomal dominant (AD) inheritance pattern most commonly associated with DCM, including the 50% risk for recurrence. Briefly reviewed autosomal recessive and X-linked inheritance. Explained that DCM gene mutations are associated with reduced penetrance and variable expressivity, meaning that individuals who carry a gene mutation may or may not get the disease and onset and severity can vary from one family member to the next. This explains why you may not see cardiomyopathy in each generation of a family.     Genetic testing is  indicated for individuals with cardiomyopathy to better understand the cause of their heart disease, progression, the likelihood of noncardiac health risks, availability of gene therapy or enzyme replacement therapies, and risk to relatives. Genetic testing that includes a panel of genes is the most cost effective and timely approach. Reviewed capabilities, limitations, and logistics of testing. Currently over 40 genes have been found to be related to DCM. Genetic testing currently identifies mutations in about 40% of idiopathic cases.     DNA sample via saliva or blood is collected and sent to testing lab for evaluation of selected genes. The results could directly impact care and treatment. This testing is medically necessary.     Explained three possible outcomes of genetic testing including: positive identification of a mutation, no mutation identified, and identification of a variant of unknown significance (VUS). If a mutation is identified, presymptomatic testing would be available to at risk family members. If no mutation is identified, it does not rule out the possibility of a genetic component to this disease. Family members could still be at risk for developing the same condition. If a VUS is identified, it is unclear if the mutation is disease causing or just a normal variation. It may take time and possibly additional testing to determine the meaning of a VUS result.     Test results take approximately 2-4 weeks on average. Discussed cost of testing through commercial labs. Explained that the lab works with insurance to determine coverage and will contact patient if out of pocket costs are expected to exceed $100. If so, patient has the option to pay reported price, cancel testing or elect self pay pricing (typcially around $250).     Discussed pros and cons of genetic testing. Explained that results could determine the cause for dilated cardiomyopathy. If a mutation is identified, presymptomatic  testing is available to all at risk relatives. Reviewed possible issues associated with presymptomatic testing including genetic discrimination, current laws to prevent discrimination (ie. LIYA), insurance issues, and emotional and psychosocial outcomes of testing.     Explained that clinical evaluation is recommended for all first degree relatives (parents, siblings, and children) of an affected individual regardless of decision to pursue genetic testing. Based on the Heart Failure Society of [place] Practice Guidelines (Dyana et al, 2018), clinical evaluation should be performed at least every 3-5 years beginning and childhood and should include history, cardiac exam, ECHO, and EKG.    All questions answered at this time.      PLAN: Clement elected to proceed with genetic testing.  Requisition and consent forms were completed and signed.  DNA will be collected via blood sample [facility]. I will contact patient when results are available.     TIME: I spent 55 minutes on the day of the encounter doing chart review, collecting medical and family history, counseling, documentation and further activities as noted above.    Johanne Ulloa MS, [facility]  Licensed, Certified Genetic Counselor  [facility]  [facility]        Please do not hesitate to contact me if you have any questions/concerns.     Sincerely,     Johanne Ulloa GC

## 2025-01-10 NOTE — PATIENT INSTRUCTIONS
"Indication for Genetic Counseling:     Non-ischemic cardiomyopathy results from causes other than loss of blood flow to the heart.  Cardiomyopathy can be caused by both acquired and genetic causes.  Acquired causes include exposure to toxins, injury related to coronary artery disease (ischemic),  infections and inflammation of the heart (myocarditis), alcohol/drug abuse, stress, and chronic high blood pressure.  When familial, it often results in dilated cardiomyopathy (DCM), where the left ventricle gets enlarged or stretched out.  Dilated cardiomyopathy (DCM) is a condition that causes the heart to lose it's elasticity, which leads to stretching and dilation.  It is usually diagnosed by an echocardiogram (ECHO) or cardiac magnetic resonance imaging (MRI).  When the heart becomes dilated, it cannot pump blood as effectively, which can lead to symptoms such as congestive heart failure, fluid accumulation in the body (edema), shortness of breath, fatigue, irregular heartbeat, fainting, stroke, cardiac arrest, and sudden cardiac death (SCD).  There are at least 40 genes known to be associated with DCM.    Inheritance:   Humans have over 20,000 genes that instruct our bodies how to function.  We have two copies of each gene because we inherit one from our mother and one from our father.  In most cardiac cases with a genetic component, the condition is inherited in an autosomal dominant (AD) pattern.  This means that in order to have the condition, a person needs to inherit a mutation on one copy of a particular gene.  This mutation or pathogenic variant dominates the \"normal\" working copy of the gene.  When an affected individual has children, they can either pass on the \"normal\" copy of the gene or the mutation.  Therefore, children have a 50% chance of inheriting the mutation.  Other family members also have an increased risk but the specific risk depends on the degree of relationship.  Additional inheritance " patterns can occur within families and may alter the risk of recurrence.     Testing Options:   Genetic testing is available to assess a panel of genes known to cause this condition.  This test reads through the DNA (sequencing) of these genes to look for spelling mistakes or mutations that could cause the condition.      There are three types of results you could receive from this test.     -Positive result (mutation/pathogenic variant identified) - confirms diagnosis and provides an answer to why this happened.  In addition, identifying a mutation allows family members to have testing to determine their risk.     -Negative result (mutation not identified) - no genetic changes were identified.  This does not rule out a genetic cause for the condition as the genetic testing only identifies 40-50% of genetic causes for this condition.    -Variant of uncertain significance (VUS) - a genetic change was identified, but there is not enough information to determine whether it is disease-causing or normal human genetic variation.     Although genetic testing may identify a mutation, it cannot provide information about the severity of symptoms or the progression of disease.  We cannot predict age of onset or severity of symptoms due to reduced penetrance and variable expressivity.    Logistics:   Genetic testing involves collecting a sample of DNA, thru blood, saliva, or cheek cells.  The sample will be sent to a laboratory to extract the DNA and sequence the genes for mutations.  The laboratory will work with your insurance company to determine the out of pocket (OOP) cost and will notify you if the OOP cost is greater than $100.  Remember to ask the lab about financial assistance pricing and self pay options as well.  Sometimes those are much lower than insurance pricing.  When testing is initiated, results take about 2-4 weeks to return. I will contact you over the phone when results are available.     Genetic  Information and Nondiscrimination Act:  The Genetic Information and Nondiscrimination Act of 2008 (LIYA) is a federal law that protects individuals from genetic discrimination in health insurance and employment. Genetic discrimination is defined as the misuse of genetic information. This law does not address potential discrimination regarding life insurance or disability insurance.      This is especially relevant for at risk individuals who are considering presymptomatic testing.    Screening Recommendations:  Explained that clinical evaluation is recommended for all first degree relatives (parents, siblings, and children) of an affected individual regardless of decision to pursue genetic testing. Based on the Heart Failure Society of [place] Practice Guidelines (Dyana et al, 2009), clinical evaluation should be performed at least every 3-5 years beginning and childhood and should include history, cardiac exam, ECHO, and EKG.    Resources:  Cardiomyopathy  Hypertrophic Cardiomyopathy Association - 4hcm.org  Children's Cardiomyopathy Foundation - childrenscardiomyopathy.org  [place] Cardiomyopathy Association - cardiomyopathy.org  Dilated Cardiomyopathy Foundation - DCMfoundation.org    Arrhythmia  Heart Rhythm Society - HRSonline.org    General   American Heart Association - americanheart.org  Genetics Home Reference - ghr.nlm.nih.gov  Genetic Information and Nondiscrimination Act - ginahelp.org    Contact Information:  Johanne Ulloa MS, [facility]  Licensed, Certified Genetic Counselor  [facility]  [facility]     Office:  [phone]  Appointments:  [phone]  Fax: [phone]  Email: [email or web address]

## 2025-01-12 LAB
Lab: NORMAL
PERFORMING LABORATORY: NORMAL
SPECIMEN STATUS: NORMAL
TEST NAME: NORMAL

## 2025-01-13 ENCOUNTER — OFFICE VISIT (OUTPATIENT)
Dept: INTERNAL MEDICINE | Facility: CLINIC | Age: 60
End: 2025-01-13
Payer: COMMERCIAL

## 2025-01-13 ENCOUNTER — HOSPITAL ENCOUNTER (OUTPATIENT)
Dept: CARDIAC REHAB | Facility: CLINIC | Age: 60
Discharge: HOME | End: 2025-01-13
Attending: INTERNAL MEDICINE
Payer: COMMERCIAL

## 2025-01-13 VITALS
TEMPERATURE: 98.2 F | DIASTOLIC BLOOD PRESSURE: 64 MMHG | BODY MASS INDEX: 26.74 KG/M2 | SYSTOLIC BLOOD PRESSURE: 95 MMHG | RESPIRATION RATE: 18 BRPM | HEART RATE: 89 BPM | HEIGHT: 70 IN | WEIGHT: 186.8 LBS

## 2025-01-13 DIAGNOSIS — I50.22 CHRONIC SYSTOLIC HEART FAILURE (H): ICD-10-CM

## 2025-01-13 DIAGNOSIS — R56.9 SEIZURES (H): ICD-10-CM

## 2025-01-13 DIAGNOSIS — Z76.89 ENCOUNTER TO ESTABLISH CARE: Primary | ICD-10-CM

## 2025-01-13 DIAGNOSIS — F41.9 ANXIETY: ICD-10-CM

## 2025-01-13 DIAGNOSIS — F32.9 MAJOR DEPRESSIVE DISORDER WITH SINGLE EPISODE, REMISSION STATUS UNSPECIFIED: ICD-10-CM

## 2025-01-13 PROBLEM — I50.9 ACUTE CONGESTIVE HEART FAILURE, UNSPECIFIED HEART FAILURE TYPE (H): Status: RESOLVED | Noted: 2024-11-18 | Resolved: 2025-01-13

## 2025-01-13 PROBLEM — J02.9 ACUTE PHARYNGITIS, UNSPECIFIED ETIOLOGY: Status: RESOLVED | Noted: 2023-06-05 | Resolved: 2025-01-13

## 2025-01-13 PROBLEM — G40.909 EPILEPSY, UNSPECIFIED, NOT INTRACTABLE, WITHOUT STATUS EPILEPTICUS (H): Status: RESOLVED | Noted: 2020-11-20 | Resolved: 2025-01-13

## 2025-01-13 PROCEDURE — 99204 OFFICE O/P NEW MOD 45 MIN: CPT | Performed by: INTERNAL MEDICINE

## 2025-01-13 PROCEDURE — 93798 PHYS/QHP OP CAR RHAB W/ECG: CPT | Performed by: REHABILITATION PRACTITIONER

## 2025-01-13 ASSESSMENT — PATIENT HEALTH QUESTIONNAIRE - PHQ9
SUM OF ALL RESPONSES TO PHQ QUESTIONS 1-9: 6
SUM OF ALL RESPONSES TO PHQ QUESTIONS 1-9: 6
10. IF YOU CHECKED OFF ANY PROBLEMS, HOW DIFFICULT HAVE THESE PROBLEMS MADE IT FOR YOU TO DO YOUR WORK, TAKE CARE OF THINGS AT HOME, OR GET ALONG WITH OTHER PEOPLE: SOMEWHAT DIFFICULT

## 2025-01-13 NOTE — PROGRESS NOTES
Assessment & Plan     Encounter to establish care  At this time, we did spend some time reviewing the patient's past medical history and medication list.  Annual physical examination was encouraged.    Major depressive disorder with single episode, remission status unspecified and Anxiety  Patient will continue his BuSpar at 5 mg in the morning with an additional 10 mg in the evening.  He is interested in establishing with a therapist to discuss his issues with anxiety and depression that have gotten slightly worse after all of his recent acute onset of medical issues.  A referral to a therapist was placed.  Patient had no further questions or concerns in this regard.  - Adult Mental Health [facility] Referral; Future    Seizures (H)  Chronic condition.  Followed by neurology.  Patient will continue his Keppra at 500 mg in the morning with an additional 1000 mg at bedtime as prescribed by his neurologist.    Chronic systolic heart failure (H)  Patient is in the process of being evaluated further by cardiology with his recent diagnosis of congestive heart failure with an ejection fraction of 17%.  He will continue his metoprolol, losartan, and Jardiance as currently prescribed.  Patient will continue using his LifeVest as recommended by his cardiologist.  He is also being evaluated by genetic counselor to see if there is a genetic issue that could be contributing to his sudden onset of cardiac issues.            See Patient Instructions    Subjective   Harshil is a 59 year old, presenting for the following health issues:  Establish Care        1/13/2025     9:17 AM   Additional Questions   Roomed by azamit   Accompanied by self         1/13/2025     9:17 AM   Patient Reported Additional Medications   Patient reports taking the following new medications none       Patient is a 59-year-old  male who presents to the clinic to establish care.  He does have a complex medical history that includes seizure disorder,  anxiety, depression, and congestive heart failure.  In regards to his cardiac issues, patient has been admitted to the hospital in November 2024 after experiencing issues with dyspnea on exertion.  He was found to have an ejection fraction of 22% with severe global hypokinesis of the left ventricle and moderately dilated right atrium.  He was evaluated by cardiology who did start him on metoprolol, losartan, rosuvastatin, and Jardiance.  He was also placed on anticoagulation with Eliquis as he does have a history of atrial fibrillation status post cardioversion.  Patient is currently wearing a LifeVest, and his most recent echocardiogram did show a slight decrease in ejection fraction at 17%.  The cardiologist has also  referred the patient to the genetic counselor to assess for any potential underlying genetic issues that could result in his sudden onset of congestive heart failure.  He does report that his father did suffer a myocardial infarction in his late 40s.  Patient does have a history of seizure disorder that has been well-controlled with Keppra 500 mg in the morning with additional 1000 mg in the evening.  He reports that it has been several years since he has had a seizure.  He does take BuSpar 5 mg in the morning and 10 mg in the evening for management of his anxiety and depression.  Patient does feel that his anxiety and depression have been slightly worse since his recent cardiac issues have occurred.  He is interested in establishing relationship with a therapist at this time.    History of Present Illness       Reason for visit:  Establish primary provider and follow up to heart tima   He is taking medications regularly.           Review of Systems  CONSTITUTIONAL: NEGATIVE for fever, chills, change in weight  INTEGUMENTARY/SKIN: NEGATIVE for worrisome rashes, moles or lesions  ENT/MOUTH: NEGATIVE for ear, mouth and throat problems  RESP: NEGATIVE for significant cough or SOB  CV: NEGATIVE for chest  "pain, palpitations or peripheral edema  GI: NEGATIVE for nausea, abdominal pain, heartburn, or change in bowel habits  MUSCULOSKELETAL: NEGATIVE for significant arthralgias or myalgia  NEURO: NEGATIVE for weakness, dizziness or paresthesias  PSYCHIATRIC: Positive for anxiety and depression.      Objective    BP 95/64 (BP Location: Right arm, Patient Position: Sitting, Cuff Size: Adult Regular)   Pulse 89   Temp 98.2  F (36.8  C) (Oral)   Resp 18   Ht 1.778 m (5' 10\")   Wt 84.7 kg (186 lb 12.8 oz)   BMI 26.80 kg/m    Body mass index is 26.8 kg/m .  Physical Exam  Vitals reviewed.   Constitutional:       Appearance: Normal appearance.   HENT:      Head: Normocephalic and atraumatic.      Mouth/Throat:      Mouth: Mucous membranes are moist.      Pharynx: Oropharynx is clear.   Eyes:      Extraocular Movements: Extraocular movements intact.      Conjunctiva/sclera: Conjunctivae normal.      Pupils: Pupils are equal, round, and reactive to light.   Cardiovascular:      Rate and Rhythm: Normal rate and regular rhythm.      Pulses: Normal pulses.      Heart sounds: Normal heart sounds.   Pulmonary:      Effort: Pulmonary effort is normal.      Breath sounds: Normal breath sounds.   Skin:     General: Skin is warm and dry.      Capillary Refill: Capillary refill takes less than 2 seconds.   Neurological:      Mental Status: He is alert.            Signed Electronically by: Han Fuller MD    "

## 2025-01-15 ENCOUNTER — HOSPITAL ENCOUNTER (OUTPATIENT)
Dept: CARDIAC REHAB | Facility: CLINIC | Age: 60
Discharge: HOME | End: 2025-01-15
Attending: INTERNAL MEDICINE
Payer: COMMERCIAL

## 2025-01-15 VITALS — BODY MASS INDEX: 25.7 KG/M2 | HEIGHT: 70 IN | WEIGHT: 179.5 LBS

## 2025-01-15 PROCEDURE — 93798 PHYS/QHP OP CAR RHAB W/ECG: CPT | Performed by: REHABILITATION PRACTITIONER

## 2025-01-15 PROCEDURE — 93797 PHYS/QHP OP CAR RHAB WO ECG: CPT

## 2025-01-15 NOTE — PROGRESS NOTES
"NUTRITION ASSESSMENT & EDUCATION NOTE    REASON FOR ASSESSMENT  Clement Robert is a 59 year old male seen by Registered Dietitian for 1:1 Cardiac Rehab consult     NUTRITION HISTORY  Information obtained from patient, chart  Patient has been instructed to follow a 2g sodium, Mediterranean diet.  Previous diet education: none  Patient has attended no nutrition classes offered by cardiac rehab  Nutrition-related goals: Nothing specific, just making sure eating the right balance of food, nutrition, protein. Would like to maintain healthy weight.   Grocery shopping, cooking: patient has been doing most of it recently and wife  Frequency of eating out: once every two weeks  Economic factors: none    Typical intake as follows:   - Breakfast: breakfast bar (low sodium)   - Lunch: low fat Greek yogurt, mixed berries, 2 tablespoons granola, low sodium cookies and mix of almonds and chocolate covered cranberries, sometimes cashews   - Dinner: Mrs. Presley to season chicken, pork, or salmon with olive oil with a vegetables (squash or sweet potato with pepper) sometimes a baked potato and light sour cream, sometimes 1/3 cup of frozen yogurt or hot tamales if wanting something sweet   - Snacks: nuts unsalted almonds and cashews, granola, berries or ch\ocolate cranberries   - Fluids: a ton of water, sometimes a decaf cup of coffee or tea, on occasion will have diet Pepsi, cranberry and orange juice    Changes to diet since cardiac event: started eating meals at home more often rather than eating out 2x/wk  Barriers to dietary changes: relaying the foundation and beginning new routines as he relies on routines, convenience when being busy. Grocery shopping is vry difficult    ANTHROPOMETRICS  Height: 5' 10\"  Weight: 179 lbs 8 oz  Body mass index is 25.76 kg/m .  Weight Status:  Overweight BMI 25-29.9  IBW: 73kg  % IBW: 112%  Weight History:   Wt Readings from Last 30 Encounters:   01/15/25 81.4 kg (179 lb 8 oz)   01/13/25 84.7 kg " (186 lb 12.8 oz)   01/06/25 85.2 kg (187 lb 14.4 oz)   12/24/24 84.8 kg (187 lb)   12/03/24 87.2 kg (192 lb 4.8 oz)   11/26/24 88 kg (194 lb)   11/21/24 86.7 kg (191 lb 1.6 oz)   03/04/16 89.8 kg (198 lb)   11/18/11 86.2 kg (190 lb)         ASSESSED NUTRITION NEEDS PER APPROVED PRACTICE GUIDELINES:  Estimated Energy Needs: 1975 kcals ([place] St Jeor)  Justification: maintenance, overweight, and activity factor 1.2  Estimated Protein Needs:  grams protein (1.2-1.5 g pro/Kg)  Justification: maintenance and preservation of lean body mass  Estimated Fluid Needs: 2440  mL (30 mL/kg)  Justification: maintenance    NUTRITION DIAGNOSIS  Food- and nutrition- related knowledge deficit related to heart healthy eating as evidenced by    INTERVENTIONS  Nutrition Prescription:  Cardiac diet:   Low saturated fat, Na <2400 mg  Fiber >25 g per day  Emphasis on plate method for balanced meals     Implementation  Assessed learning needs and learning preference.  Nutrition Education (Content):  Provided handouts:  Heart Failure Nutrition Therapy (2023)  Heart-Healthy Label Reading Tips (2018)  High-Protein Foods List (2022)  Low-Sodium Nutrition Therapy (2022)  General, Healthful Mediterranean Nutrition Therapy (2022)  Discussed heart healthy diet recommendations, including label reading, minimizing added salts/saturated fats/sugars, balanced meals TID, at least two food groups per snack, heart healthy substitutes, eliminating all trans fat, sugar free beverages  Nutrition Education (Application):  Discussed eating habits and recommended alternative food choices:  Emphasized fruits, vegetables, low sodium nuts/heart healthy fats, fish, low fat dairy  Reviewed recipes and new food ideas such as beans, lentils, vegetables and tofu for an increase in plant based options   Encouraged water and non sugar sweetened beverages   Discussed cooking more from scratch to monitor meal ingredients, portions, menu choices     Goals/Follow  up/Monitoring For Cardiac Rehab Staff  Adherence to nutrition related recommendations, follow with cardiac rehab  1500-2000mg sodium, 98-122g protein daily, < 15g saturated fats, < 38g added sugars daily  Additional questions/concerns related to heart healthy guidelines      Bony Faulkner MS,RDN,LD,[facility]  Office: [phone]    DIRECT PATIENT CARE TIME: 60 MINUTES

## 2025-01-17 ENCOUNTER — HOSPITAL ENCOUNTER (OUTPATIENT)
Dept: CARDIAC REHAB | Facility: CLINIC | Age: 60
Discharge: HOME | End: 2025-01-17
Attending: INTERNAL MEDICINE
Payer: COMMERCIAL

## 2025-01-17 PROCEDURE — 93798 PHYS/QHP OP CAR RHAB W/ECG: CPT

## 2025-01-20 ENCOUNTER — HOSPITAL ENCOUNTER (OUTPATIENT)
Dept: CARDIAC REHAB | Facility: CLINIC | Age: 60
Discharge: HOME | End: 2025-01-20
Attending: INTERNAL MEDICINE
Payer: COMMERCIAL

## 2025-01-20 PROCEDURE — 93797 PHYS/QHP OP CAR RHAB WO ECG: CPT

## 2025-01-20 PROCEDURE — 93798 PHYS/QHP OP CAR RHAB W/ECG: CPT | Performed by: REHABILITATION PRACTITIONER

## 2025-01-22 ENCOUNTER — HOSPITAL ENCOUNTER (OUTPATIENT)
Dept: CARDIAC REHAB | Facility: CLINIC | Age: 60
Discharge: HOME | End: 2025-01-22
Attending: INTERNAL MEDICINE
Payer: COMMERCIAL

## 2025-01-22 PROCEDURE — 93798 PHYS/QHP OP CAR RHAB W/ECG: CPT | Performed by: REHABILITATION PRACTITIONER

## 2025-01-23 LAB
SCANNED LAB RESULT: NORMAL
TEST NAME: NORMAL

## 2025-01-24 ENCOUNTER — HOSPITAL ENCOUNTER (OUTPATIENT)
Dept: CARDIAC REHAB | Facility: CLINIC | Age: 60
Discharge: HOME | End: 2025-01-24
Attending: INTERNAL MEDICINE
Payer: COMMERCIAL

## 2025-01-24 PROCEDURE — 93798 PHYS/QHP OP CAR RHAB W/ECG: CPT

## 2025-01-27 ENCOUNTER — HOSPITAL ENCOUNTER (OUTPATIENT)
Dept: CARDIAC REHAB | Facility: CLINIC | Age: 60
Discharge: HOME | End: 2025-01-27
Attending: INTERNAL MEDICINE
Payer: COMMERCIAL

## 2025-01-27 PROCEDURE — 93798 PHYS/QHP OP CAR RHAB W/ECG: CPT

## 2025-01-28 ENCOUNTER — TELEPHONE (OUTPATIENT)
Dept: CARDIOLOGY | Facility: CLINIC | Age: 60
End: 2025-01-28
Payer: COMMERCIAL

## 2025-01-28 NOTE — TELEPHONE ENCOUNTER
Called Harshil to review genetic testing results, no answer. LVM and invited call back.    Johanne Ulloa MS, [facility]  Licensed, Certified Genetic Counselor  [facility]  [facility]

## 2025-01-29 ENCOUNTER — HOSPITAL ENCOUNTER (OUTPATIENT)
Dept: CARDIAC REHAB | Facility: CLINIC | Age: 60
Discharge: HOME | End: 2025-01-29
Attending: INTERNAL MEDICINE
Payer: COMMERCIAL

## 2025-01-29 PROCEDURE — 93798 PHYS/QHP OP CAR RHAB W/ECG: CPT

## 2025-01-31 ENCOUNTER — HOSPITAL ENCOUNTER (OUTPATIENT)
Dept: CARDIAC REHAB | Facility: CLINIC | Age: 60
Discharge: HOME | End: 2025-01-31
Attending: INTERNAL MEDICINE
Payer: COMMERCIAL

## 2025-01-31 PROCEDURE — 93798 PHYS/QHP OP CAR RHAB W/ECG: CPT

## 2025-02-03 ENCOUNTER — HOSPITAL ENCOUNTER (OUTPATIENT)
Dept: CARDIAC REHAB | Facility: CLINIC | Age: 60
Discharge: HOME | End: 2025-02-03
Attending: INTERNAL MEDICINE
Payer: COMMERCIAL

## 2025-02-03 PROCEDURE — 93798 PHYS/QHP OP CAR RHAB W/ECG: CPT | Performed by: REHABILITATION PRACTITIONER

## 2025-02-05 ENCOUNTER — HOSPITAL ENCOUNTER (OUTPATIENT)
Dept: CARDIAC REHAB | Facility: CLINIC | Age: 60
Discharge: HOME | End: 2025-02-05
Attending: INTERNAL MEDICINE
Payer: COMMERCIAL

## 2025-02-05 PROCEDURE — 93798 PHYS/QHP OP CAR RHAB W/ECG: CPT

## 2025-02-07 ENCOUNTER — TELEPHONE (OUTPATIENT)
Dept: CARDIOLOGY | Facility: CLINIC | Age: 60
End: 2025-02-07

## 2025-02-07 ENCOUNTER — HOSPITAL ENCOUNTER (OUTPATIENT)
Dept: CARDIAC REHAB | Facility: CLINIC | Age: 60
Discharge: HOME | End: 2025-02-07
Attending: INTERNAL MEDICINE
Payer: COMMERCIAL

## 2025-02-07 PROCEDURE — 93798 PHYS/QHP OP CAR RHAB W/ECG: CPT

## 2025-02-07 NOTE — TELEPHONE ENCOUNTER
Received forms from [employer] to manage lifevest script going forward.    Dr. Richardson ordered the [facility] at discharge on 11/21/2024. Confirmation form filled out and sent to Dr. Richardson for signature.    Request form for Medical records sent to [facility]    Patient to have an echo on 3/3/2025 and see Dr. Richardson on 3/6/2025 to assess need for ZOLL.    2/20/2025 1500  [employer] forms sent to [employer] @ [phone].  Copy sent to scan.

## 2025-02-10 ENCOUNTER — HOSPITAL ENCOUNTER (OUTPATIENT)
Dept: CARDIAC REHAB | Facility: CLINIC | Age: 60
Discharge: HOME | End: 2025-02-10
Attending: INTERNAL MEDICINE
Payer: COMMERCIAL

## 2025-02-10 PROCEDURE — 93798 PHYS/QHP OP CAR RHAB W/ECG: CPT

## 2025-02-11 ENCOUNTER — OFFICE VISIT (OUTPATIENT)
Dept: CARDIOLOGY | Facility: CLINIC | Age: 60
End: 2025-02-11
Attending: PHYSICIAN ASSISTANT
Payer: COMMERCIAL

## 2025-02-11 ENCOUNTER — MYC MEDICAL ADVICE (OUTPATIENT)
Dept: CARDIOLOGY | Facility: CLINIC | Age: 60
End: 2025-02-11

## 2025-02-11 ENCOUNTER — LAB (OUTPATIENT)
Dept: LAB | Facility: CLINIC | Age: 60
End: 2025-02-11
Payer: COMMERCIAL

## 2025-02-11 VITALS
SYSTOLIC BLOOD PRESSURE: 114 MMHG | DIASTOLIC BLOOD PRESSURE: 73 MMHG | HEIGHT: 70 IN | BODY MASS INDEX: 26.76 KG/M2 | WEIGHT: 186.9 LBS | HEART RATE: 71 BPM | OXYGEN SATURATION: 97 % | RESPIRATION RATE: 16 BRPM

## 2025-02-11 DIAGNOSIS — I42.8 NON-ISCHEMIC CARDIOMYOPATHY (H): ICD-10-CM

## 2025-02-11 DIAGNOSIS — I50.22 CHRONIC SYSTOLIC CONGESTIVE HEART FAILURE (H): Primary | ICD-10-CM

## 2025-02-11 DIAGNOSIS — I25.10 CORONARY ARTERY DISEASE INVOLVING NATIVE CORONARY ARTERY OF NATIVE HEART WITHOUT ANGINA PECTORIS: ICD-10-CM

## 2025-02-11 LAB
ALT SERPL W P-5'-P-CCNC: 85 U/L (ref 0–70)
ANION GAP SERPL CALCULATED.3IONS-SCNC: 8 MMOL/L (ref 7–15)
BUN SERPL-MCNC: 10.3 MG/DL (ref 6–20)
CALCIUM SERPL-MCNC: 9.4 MG/DL (ref 8.8–10.4)
CHLORIDE SERPL-SCNC: 103 MMOL/L (ref 98–107)
CHOLEST SERPL-MCNC: 112 MG/DL
CREAT SERPL-MCNC: 1.02 MG/DL (ref 0.67–1.17)
EGFRCR SERPLBLD CKD-EPI 2021: 85 ML/MIN/1.73M2
FASTING STATUS PATIENT QL REPORTED: YES
GLUCOSE SERPL-MCNC: 117 MG/DL (ref 70–99)
HCO3 SERPL-SCNC: 29 MMOL/L (ref 22–29)
HDLC SERPL-MCNC: 54 MG/DL
HGB BLD-MCNC: 15.4 G/DL (ref 13.3–17.7)
LDLC SERPL CALC-MCNC: 44 MG/DL
NONHDLC SERPL-MCNC: 58 MG/DL
NT-PROBNP SERPL-MCNC: 1830 PG/ML (ref 0–900)
POTASSIUM SERPL-SCNC: 4 MMOL/L (ref 3.4–5.3)
SODIUM SERPL-SCNC: 140 MMOL/L (ref 135–145)
TRIGL SERPL-MCNC: 69 MG/DL

## 2025-02-11 PROCEDURE — 84460 ALANINE AMINO (ALT) (SGPT): CPT | Performed by: PHYSICIAN ASSISTANT

## 2025-02-11 PROCEDURE — 36415 COLL VENOUS BLD VENIPUNCTURE: CPT | Performed by: PHYSICIAN ASSISTANT

## 2025-02-11 PROCEDURE — 99214 OFFICE O/P EST MOD 30 MIN: CPT | Performed by: PHYSICIAN ASSISTANT

## 2025-02-11 PROCEDURE — 80048 BASIC METABOLIC PNL TOTAL CA: CPT | Performed by: PHYSICIAN ASSISTANT

## 2025-02-11 PROCEDURE — 80061 LIPID PANEL: CPT | Performed by: PHYSICIAN ASSISTANT

## 2025-02-11 PROCEDURE — 85018 HEMOGLOBIN: CPT | Performed by: PHYSICIAN ASSISTANT

## 2025-02-11 PROCEDURE — G2211 COMPLEX E/M VISIT ADD ON: HCPCS | Performed by: PHYSICIAN ASSISTANT

## 2025-02-11 PROCEDURE — 83880 ASSAY OF NATRIURETIC PEPTIDE: CPT | Performed by: PHYSICIAN ASSISTANT

## 2025-02-11 RX ORDER — SACUBITRIL AND VALSARTAN 24; 26 MG/1; MG/1
TABLET, FILM COATED ORAL
Qty: 90 TABLET | Refills: 3 | Status: SHIPPED | OUTPATIENT
Start: 2025-02-11

## 2025-02-11 NOTE — PATIENT INSTRUCTIONS
Today, we discussed the following:  Medication changes: STOP Losartan. START Entresto 1/2 tablet (12-13 mg) twice daily. Let me know if your blood pressure starts to sit lower, and/or you feel more lightheaded after this change.   Follow up: As scheduled with Dr. Richardson, echo prior.     Please, remember to continue the followin.  Weigh yourself daily. Call if your weight is up > than 2 pounds in one day, or 5 pounds in one week; if you feel more short of breath or have worsening swelling in your legs or abdomen.  2.  Eat a low sodium diet (less than 2,000mg or 2g daily). If you eat less salt, you will retain less fluid.   3.  Avoid alcohol, as this can worsen heart failure.   4.  Avoid NSAIDs as able (For example, Ibuprofen / aleve / advil / naprosen / diclofenac).    Thank you for your visit with the [facility] today.   CORE stands for Cardiomyopathy Optimization Rehabilitation and Education. The CORE clinic will teach and help you to manage your heart failure and keep you out of the hospital.    Call C.O.R.E. nurse for any questions or concerns Mon-Fri 8am-4pm  [phone]: Nurse number   [phone]: After Hours Phone Number

## 2025-02-11 NOTE — Clinical Note
2/11/2025    Han Fuller MD  [address] [facility] [address]    RE: Clement Robert       Dear Colleague,     I had the pleasure of seeing Clement Robert in the [facility].    Cardiology C.O.R.E (heart failure specialty) Clinic Progress Note    Clement Robert MRN# [id number]   YOB: 1965 Age: 59 year old     Primary cardiology team: Dr. Richardson         Assessment and Plan     In summary, Clement Robert presents today for a CORE clinic follow up visit.     HFrEF, severely dilated CMP  EF: 22% (11/2024 TTE); 17% on 12/2024 cMRI.  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  Valves: mild MR  Volume: Appears well-compensated  GDMT: jardiance 10, Losartan 12.5 BID, Toprol 25.  QRS: Wide - LBBB   Device: None - Lifevest in place  Code status: Full  Creatinine: Normal  CHF admissions: 11/2024    PAF.  Isolated episode, none documented since 2011.  Now anticoagulated for a [id number] of 1 per Dr. Richardson.    Plan:  ***    Follow-up:  As scheduled with Dr. Richardson in March with TTE prior. Consider CRT-D if no significant EF improvement.      ANNA Horner [facility]         History of Presenting Illness     Clement Robert is a pleasant 59 year old patient with a pertinent history of the following -   PAF.  Diagnosed in 2011 after being hit by hockey puck and sent to ED. Underwent cardioversion.  EF normal at that time.  Recently started on anticoagulation with Eliquis. [id number] sore is 1.  NICMP  Pt was lost to follow-up after 2011 until hospitalization in November 2024, with progressive dyspnea and intermittent palpitations, found to have a severely reduced ejection fraction of 20% with global hypokinesis and severe dilation with an LVIDD of 7.7 cm, normal RV function, mild MR. Trops normal.   Diuresed, placed on Toprol 25, Losartan 12.5, jardiance  "10, no diuretics. Discharge wt 190 lbs.  Given concerns for ventricular ectopy and family history of sudden cardiac death, LifeVest was placed on discharge.  Cardiac MRI was completed 12/19/2024 that revealed severe nonischemic dilated cardiomyopathy with LVEF 17%.  There was no late enhancement to suggest prior infarct, inflammation, or infiltration.   LBBB, newly noted in 2020. Stress nuclear study at that time showed EF 56%, no ischemia.  Very mild CAD on 11/2024 angiogram.  +FH of SCD father dying from \"heart attack\" in his 40's.   Seizure disorder, migraines, anxiety.  No significant alcohol use. No illicit drug use.     Today, Harshil returns to clinic stating he's feeling generally well, ***. He denies chest pain, orthopnea, edema, claudication, palpitations, near syncope or syncope. Weighing daily, following strict sodium restriction. Remains understandably anxious, many appropriate questions today about diagnosis/prognosis/management. Tried skating a couple times recently and felt pretty well with it. BP at home 100-110 systolic. Resting SBP at cardiac rehab typically ~90 mmHg.          Review of Systems     12-pt ROS is negative except for as noted in the HPI.          Physical Exam     Vitals: /73 (BP Location: Right arm, Patient Position: Chair, Cuff Size: Adult Regular)   Pulse 71   Resp 16   Ht 1.778 m (5' 10\")   Wt 84.8 kg (186 lb 14.4 oz)   SpO2 97%   BMI 26.82 kg/m    Wt Readings from Last 10 Encounters:   02/11/25 84.8 kg (186 lb 14.4 oz)   01/15/25 81.4 kg (179 lb 8 oz)   01/13/25 84.7 kg (186 lb 12.8 oz)   01/06/25 85.2 kg (187 lb 14.4 oz)   12/24/24 84.8 kg (187 lb)   12/03/24 87.2 kg (192 lb 4.8 oz)   11/26/24 88 kg (194 lb)   11/21/24 86.7 kg (191 lb 1.6 oz)   03/04/16 89.8 kg (198 lb)   11/18/11 86.2 kg (190 lb)       Constitutional:  Patient is pleasant, alert, cooperative, and in NAD.  HEENT:  NCAT. PERRLA. EOM's intact.   Neck:  CVP appears normal. No carotid bruits.   Pulmonary: " Normal respiratory effort. CTAB.   Cardiac: RRR, normal S1/S2, no S3/S4, no murmur or rub.   Abdomen:  Non-tender abdomen, no hepatosplenomegaly appreciated.   Vascular: Pulses in the upper and lower extremities are 2+ and equal bilaterally.  Extremities: No edema, erythema, cyanosis or tenderness appreciated.  Skin:  No rashes or lesions appreciated.   Neurological:  No gross motor or sensory deficits.   Psych: Appropriate affect.          Data   Labs reviewed:  Recent Labs   Lab Test 11/25/24  0830 11/19/24  0642 11/18/24  1441   LDL  --  145*  --    HDL  --  65  --    NHDL  --  154*  --    CHOL  --  219*  --    TRIG  --  47  --    TSH  --   --  4.12   NTBNP 1,063*  --   --        Lab Results   Component Value Date    WBC 4.7 11/25/2024    WBC 8.5 11/18/2011    RBC 5.21 11/25/2024    RBC 4.90 11/18/2011    HGB 15.4 02/11/2025    HGB 15.3 11/18/2011    HCT 45.7 11/25/2024    HCT 43.2 11/18/2011    MCV 88 11/25/2024    MCV 88 11/18/2011    MCH 29.4 11/25/2024    MCH 31.2 11/18/2011    MCHC 33.5 11/25/2024    MCHC 35.4 11/18/2011    RDW 11.8 11/25/2024    RDW 12.1 11/18/2011     11/25/2024     11/18/2011       Lab Results   Component Value Date     12/24/2024     11/18/2011    POTASSIUM 4.2 12/24/2024    POTASSIUM 4.0 11/18/2011    CHLORIDE 105 12/24/2024    CHLORIDE 104 11/18/2011    CO2 27 12/24/2024    CO2 26 11/18/2011    ANIONGAP 10 12/24/2024    ANIONGAP 7 11/18/2011    GLC 99 12/24/2024    GLC 96 11/18/2011    BUN 16.4 12/24/2024    BUN 12 11/18/2011    CR 1.10 12/24/2024    CR 0.74 11/18/2011    GFRESTIMATED 78 12/24/2024    GFRESTIMATED >90 11/18/2011    GFRESTBLACK >90 11/18/2011    JAMES 9.6 12/24/2024    JAMES 8.7 11/18/2011      Lab Results   Component Value Date    AST 22 11/25/2024    AST 41 07/29/2010    ALT 42 11/25/2024    ALT     34 11/28/2011    ALT 34 11/28/2011       Lab Results   Component Value Date    A1C 5.1 11/18/2024       Lab Results   Component Value Date    INR  0.99 11/18/2024            Problem List     Patient Active Problem List   Diagnosis    Seizures (H)    Anxiety    Epilepsy without status epilepticus, not intractable, unspecified    LBBB (left bundle branch block)    Major depressive disorder, single episode, unspecified    Polyp of colon    Chronic systolic heart failure (H)            Medications     Current Outpatient Medications   Medication Sig Dispense Refill    apixaban ANTICOAGULANT (ELIQUIS) 5 MG tablet Take 1 tablet (5 mg) by mouth 2 times daily. 180 tablet 3    busPIRone (BUSPAR) 5 MG tablet Take 10 mg by mouth at bedtime.      busPIRone (BUSPAR) 5 MG tablet Take 5 mg by mouth every morning.      empagliflozin (JARDIANCE) 10 MG TABS tablet Take 1 tablet (10 mg) by mouth daily. 90 tablet 1    levETIRAcetam (KEPPRA) 500 MG tablet Take 1,000 mg by mouth at bedtime.      levETIRAcetam (KEPPRA) 500 MG tablet Take 500 mg by mouth every morning.  6    losartan (COZAAR) 25 MG tablet Take 0.5 tablets (12.5 mg) by mouth 2 times daily.      metoprolol succinate ER (TOPROL XL) 25 MG 24 hr tablet Take 1 tablet (25 mg) by mouth daily. 90 tablet 1    nortriptyline (PAMELOR) 75 MG capsule TAKE 1 CAPSULE BY MOUTH AT BEDTIME*      rosuvastatin (CRESTOR) 20 MG tablet Take 1 tablet (20 mg) by mouth at bedtime. 90 tablet 3            Past Medical History     Past Medical History:   Diagnosis Date    Anxiety     Seizures (H)      Past Surgical History:   Procedure Laterality Date    CV CORONARY ANGIOGRAM N/A 11/20/2024    Procedure: Coronary Angiogram;  Surgeon: Dominick Das MD;  Location:  HEART CARDIAC CATH LAB     Family History   Problem Relation Age of Onset    Family History Negative Mother     Family History Negative Father      Social History     Socioeconomic History    Marital status:      Spouse name: Not on file    Number of children: Not on file    Years of education: Not on file    Highest education level: Not on file   Occupational History     Not on file   Tobacco Use    Smoking status: Never    Smokeless tobacco: Never   Vaping Use    Vaping status: Never Used   Substance and Sexual Activity    Alcohol use: Yes     Comment: occ    Drug use: No    Sexual activity: Yes     Partners: Female   Other Topics Concern    Parent/sibling w/ CABG, MI or angioplasty before 65F 55M? Not Asked   Social History Narrative    Not on file     Social Drivers of Health     Financial Resource Strain: Low Risk  (11/19/2024)    Financial Resource Strain     Within the past 12 months, have you or your family members you live with been unable to get utilities (heat, electricity) when it was really needed?: No   Food Insecurity: Low Risk  (11/19/2024)    Food Insecurity     Within the past 12 months, did you worry that your food would run out before you got money to buy more?: No     Within the past 12 months, did the food you bought just not last and you didn t have money to get more?: No   Transportation Needs: Low Risk  (11/19/2024)    Transportation Needs     Within the past 12 months, has lack of transportation kept you from medical appointments, getting your medicines, non-medical meetings or appointments, work, or from getting things that you need?: No   Physical Activity: Not on file   Stress: Not on file   Social Connections: Socially Integrated (11/26/2023)    [facility]    Social Connections     Do you often feel lonely or isolated from those around you?: 0   Interpersonal Safety: Low Risk  (11/19/2024)    Interpersonal Safety     Do you feel physically and emotionally safe where you currently live?: Yes     Within the past 12 months, have you been hit, slapped, kicked or otherwise physically hurt by someone?: No     Within the past 12 months, have you been humiliated or emotionally abused in other ways by your partner or ex-partner?: No   Housing Stability: Low Risk  (11/19/2024)    Housing Stability     Do you have  housing? : Yes     Are you worried about losing your housing?: No            Allergies   Patient has no known allergies.    Today's clinic visit entailed:  {[facility] 2021 Documentation (Optional):722165}  {2021 E&M time:186685}  Provider  Link to [facility] Help Grid     {[facility] Level:942708}      The longitudinal plan of care for the diagnosis(es)/condition(s) as documented were addressed during this visit. Due to the added complexity in care, I will continue to support Harshil in the subsequent management and with ongoing continuity of care.        Thank you for allowing me to participate in the care of your patient.      Sincerely,     Delicia Kc PA-C     [facility]  cc:   Delicia Kc PA-C  [address]  [place] [address]

## 2025-02-11 NOTE — PROGRESS NOTES
Cardiology C.O.R.E (heart failure specialty) Clinic Progress Note    Clement Robert MRN# [id number]   YOB: 1965 Age: 59 year old     Primary cardiology team: Dr. Richardson         Assessment and Plan     In summary, Clement Robert presents today for a CORE clinic follow up visit.     HFrEF, severely dilated CMP  EF: 22% (11/2024 TTE); 17% on 12/2024 cMRI.  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  Valves: mild MR  Volume: Appears well-compensated  GDMT: jardiance 10, Losartan 12.5 BID, Toprol 25.  QRS: Wide - LBBB   Device: None - Lifevest in place  Code status: Full  Creatinine: Normal  CHF admissions: 11/2024    PAF.  Isolated episode, none documented since 2011.  Now anticoagulated for a [id number] of 1 per Dr. Richardson.    Plan:  ***    Follow-up:  As scheduled with Dr. Richardson in March with TTE prior. Consider CRT-D if no significant EF improvement.      [facility] Horner [facility]         History of Presenting Illness     Clement Robert is a pleasant 59 year old patient with a pertinent history of the following -   PAF.  Diagnosed in 2011 after being hit by hockey puck and sent to ED. Underwent cardioversion.  EF normal at that time.  Recently started on anticoagulation with Eliquis. [id number] sore is 1.  NICMP  Pt was lost to follow-up after 2011 until hospitalization in November 2024, with progressive dyspnea and intermittent palpitations, found to have a severely reduced ejection fraction of 20% with global hypokinesis and severe dilation with an LVIDD of 7.7 cm, normal RV function, mild MR. Trops normal.   Diuresed, placed on Toprol 25, Losartan 12.5, jardiance 10, no diuretics. Discharge wt 190 lbs.  Given concerns for ventricular ectopy and family history of sudden cardiac death, LifeVest was placed on discharge.  Cardiac MRI was completed 12/19/2024 that revealed severe  "nonischemic dilated cardiomyopathy with LVEF 17%.  There was no late enhancement to suggest prior infarct, inflammation, or infiltration.   LBBB, newly noted in 2020. Stress nuclear study at that time showed EF 56%, no ischemia.  Very mild CAD on 11/2024 angiogram.  +FH of SCD father dying from \"heart attack\" in his 40's.   Seizure disorder, migraines, anxiety.  No significant alcohol use. No illicit drug use.     Today, Harshil returns to clinic stating he's feeling generally well, ***. He denies chest pain, orthopnea, edema, claudication, palpitations, near syncope or syncope. Weighing daily, following strict sodium restriction. Remains understandably anxious, many appropriate questions today about diagnosis/prognosis/management. Tried skating a couple times recently and felt pretty well with it. BP at home 100-110 systolic. Resting SBP at cardiac rehab typically ~90 mmHg.          Review of Systems     12-pt ROS is negative except for as noted in the HPI.          Physical Exam     Vitals: /73 (BP Location: Right arm, Patient Position: Chair, Cuff Size: Adult Regular)   Pulse 71   Resp 16   Ht 1.778 m (5' 10\")   Wt 84.8 kg (186 lb 14.4 oz)   SpO2 97%   BMI 26.82 kg/m    Wt Readings from Last 10 Encounters:   02/11/25 84.8 kg (186 lb 14.4 oz)   01/15/25 81.4 kg (179 lb 8 oz)   01/13/25 84.7 kg (186 lb 12.8 oz)   01/06/25 85.2 kg (187 lb 14.4 oz)   12/24/24 84.8 kg (187 lb)   12/03/24 87.2 kg (192 lb 4.8 oz)   11/26/24 88 kg (194 lb)   11/21/24 86.7 kg (191 lb 1.6 oz)   03/04/16 89.8 kg (198 lb)   11/18/11 86.2 kg (190 lb)       Constitutional:  Patient is pleasant, alert, cooperative, and in NAD.  HEENT:  NCAT. PERRLA. EOM's intact.   Neck:  CVP appears normal. No carotid bruits.   Pulmonary: Normal respiratory effort. CTAB.   Cardiac: RRR, normal S1/S2, no S3/S4, no murmur or rub.   Abdomen:  Non-tender abdomen, no hepatosplenomegaly appreciated.   Vascular: Pulses in the upper and lower extremities are " 2+ and equal bilaterally.  Extremities: No edema, erythema, cyanosis or tenderness appreciated.  Skin:  No rashes or lesions appreciated.   Neurological:  No gross motor or sensory deficits.   Psych: Appropriate affect.          Data   Labs reviewed:  Recent Labs   Lab Test 11/25/24  0830 11/19/24  0642 11/18/24  1441   LDL  --  145*  --    HDL  --  65  --    NHDL  --  154*  --    CHOL  --  219*  --    TRIG  --  47  --    TSH  --   --  4.12   NTBNP 1,063*  --   --        Lab Results   Component Value Date    WBC 4.7 11/25/2024    WBC 8.5 11/18/2011    RBC 5.21 11/25/2024    RBC 4.90 11/18/2011    HGB 15.4 02/11/2025    HGB 15.3 11/18/2011    HCT 45.7 11/25/2024    HCT 43.2 11/18/2011    MCV 88 11/25/2024    MCV 88 11/18/2011    MCH 29.4 11/25/2024    MCH 31.2 11/18/2011    MCHC 33.5 11/25/2024    MCHC 35.4 11/18/2011    RDW 11.8 11/25/2024    RDW 12.1 11/18/2011     11/25/2024     11/18/2011       Lab Results   Component Value Date     12/24/2024     11/18/2011    POTASSIUM 4.2 12/24/2024    POTASSIUM 4.0 11/18/2011    CHLORIDE 105 12/24/2024    CHLORIDE 104 11/18/2011    CO2 27 12/24/2024    CO2 26 11/18/2011    ANIONGAP 10 12/24/2024    ANIONGAP 7 11/18/2011    GLC 99 12/24/2024    GLC 96 11/18/2011    BUN 16.4 12/24/2024    BUN 12 11/18/2011    CR 1.10 12/24/2024    CR 0.74 11/18/2011    GFRESTIMATED 78 12/24/2024    GFRESTIMATED >90 11/18/2011    GFRESTBLACK >90 11/18/2011    JAMES 9.6 12/24/2024    JAMES 8.7 11/18/2011      Lab Results   Component Value Date    AST 22 11/25/2024    AST 41 07/29/2010    ALT 42 11/25/2024    ALT     34 11/28/2011    ALT 34 11/28/2011       Lab Results   Component Value Date    A1C 5.1 11/18/2024       Lab Results   Component Value Date    INR 0.99 11/18/2024            Problem List     Patient Active Problem List   Diagnosis    Seizures (H)    Anxiety    Epilepsy without status epilepticus, not intractable, unspecified    LBBB (left bundle branch block)     Major depressive disorder, single episode, unspecified    Polyp of colon    Chronic systolic heart failure (H)            Medications     Current Outpatient Medications   Medication Sig Dispense Refill    apixaban ANTICOAGULANT (ELIQUIS) 5 MG tablet Take 1 tablet (5 mg) by mouth 2 times daily. 180 tablet 3    busPIRone (BUSPAR) 5 MG tablet Take 10 mg by mouth at bedtime.      busPIRone (BUSPAR) 5 MG tablet Take 5 mg by mouth every morning.      empagliflozin (JARDIANCE) 10 MG TABS tablet Take 1 tablet (10 mg) by mouth daily. 90 tablet 1    levETIRAcetam (KEPPRA) 500 MG tablet Take 1,000 mg by mouth at bedtime.      levETIRAcetam (KEPPRA) 500 MG tablet Take 500 mg by mouth every morning.  6    losartan (COZAAR) 25 MG tablet Take 0.5 tablets (12.5 mg) by mouth 2 times daily.      metoprolol succinate ER (TOPROL XL) 25 MG 24 hr tablet Take 1 tablet (25 mg) by mouth daily. 90 tablet 1    nortriptyline (PAMELOR) 75 MG capsule TAKE 1 CAPSULE BY MOUTH AT BEDTIME*      rosuvastatin (CRESTOR) 20 MG tablet Take 1 tablet (20 mg) by mouth at bedtime. 90 tablet 3            Past Medical History     Past Medical History:   Diagnosis Date    Anxiety     Seizures (H)      Past Surgical History:   Procedure Laterality Date    CV CORONARY ANGIOGRAM N/A 11/20/2024    Procedure: Coronary Angiogram;  Surgeon: Dominick Das MD;  Location:  HEART CARDIAC CATH LAB     Family History   Problem Relation Age of Onset    Family History Negative Mother     Family History Negative Father      Social History     Socioeconomic History    Marital status:      Spouse name: Not on file    Number of children: Not on file    Years of education: Not on file    Highest education level: Not on file   Occupational History    Not on file   Tobacco Use    Smoking status: Never    Smokeless tobacco: Never   Vaping Use    Vaping status: Never Used   Substance and Sexual Activity    Alcohol use: Yes     Comment: occ    Drug use: No    Sexual  activity: Yes     Partners: Female   Other Topics Concern    Parent/sibling w/ CABG, MI or angioplasty before 65F 55M? Not Asked   Social History Narrative    Not on file     Social Drivers of Health     Financial Resource Strain: Low Risk  (11/19/2024)    Financial Resource Strain     Within the past 12 months, have you or your family members you live with been unable to get utilities (heat, electricity) when it was really needed?: No   Food Insecurity: Low Risk  (11/19/2024)    Food Insecurity     Within the past 12 months, did you worry that your food would run out before you got money to buy more?: No     Within the past 12 months, did the food you bought just not last and you didn t have money to get more?: No   Transportation Needs: Low Risk  (11/19/2024)    Transportation Needs     Within the past 12 months, has lack of transportation kept you from medical appointments, getting your medicines, non-medical meetings or appointments, work, or from getting things that you need?: No   Physical Activity: Not on file   Stress: Not on file   Social Connections: Socially Integrated (11/26/2023)    [facility]    Social Connections     Do you often feel lonely or isolated from those around you?: 0   Interpersonal Safety: Low Risk  (11/19/2024)    Interpersonal Safety     Do you feel physically and emotionally safe where you currently live?: Yes     Within the past 12 months, have you been hit, slapped, kicked or otherwise physically hurt by someone?: No     Within the past 12 months, have you been humiliated or emotionally abused in other ways by your partner or ex-partner?: No   Housing Stability: Low Risk  (11/19/2024)    Housing Stability     Do you have housing? : Yes     Are you worried about losing your housing?: No            Allergies   Patient has no known allergies.    Today's clinic visit entailed:  {MDM 2021 Documentation (Optional):368251}  {2021 E&M  time:116775}  Provider  Link to MDM Help Grid     {MDM Level:435935}      The longitudinal plan of care for the diagnosis(es)/condition(s) as documented were addressed during this visit. Due to the added complexity in care, I will continue to support Harshil in the subsequent management and with ongoing continuity of care.

## 2025-02-12 ENCOUNTER — HOSPITAL ENCOUNTER (OUTPATIENT)
Dept: CARDIAC REHAB | Facility: CLINIC | Age: 60
Discharge: HOME | End: 2025-02-12
Attending: INTERNAL MEDICINE
Payer: COMMERCIAL

## 2025-02-12 PROCEDURE — 93798 PHYS/QHP OP CAR RHAB W/ECG: CPT | Performed by: REHABILITATION PRACTITIONER

## 2025-02-12 NOTE — TELEPHONE ENCOUNTER
"[facility]: Heart Failure Care Coordination   Follow-Up Outreach     Situation/Background:      Chief Complaint   Patient presents with    Clinic Care Coordination - Follow-up     Pt has question about NT pro BNP      My Chart received from pt: \"   Should I be alarmed that my bnp level is so high -- 1,800? It was about 1000 in the hospital. Thanks. \"       Current GDMT:   Empagliflozin 10 mg once daily  Metoprolol succinate 25 mg one daily  Entresto 24/26 mg twice daily    Current potassium chloride dose: None     Other pertinent information:   -- 2/11/25: CORE Return with Delicia Kc, PAC:  Volume: Appears well-compensated  GDMT: jardiance 10, Losartan 12.5 BID, Toprol 25.  QRS: Wide - LBBB   Device: None - Lifevest in place  Follow-up:  As scheduled with Dr. Richardson in March with TTE prior. Consider CRT-D if no significant EF improvement.         Assessment:      Notes:    Out patient NT pro BNPs  2/11/25      1,830  11/25/24      1063     Inpatient NT pro BNP   11/18/24    4,808    Recent weights  1/13       186 lbs  2/11     179.7 lbs      Last admission  11/18 (202)  - 11/21/24 (191)      Intervention/Plan:          RN CC routing to provider for review.    Future Appointments   Date Time Provider Department Center   2/14/2025  7:00 AM 2, Rh Cardiac [facility] [place] [facility]   2/17/2025  7:00 AM 2, [facility]   2/18/2025  7:00 AM 1, [facility]   3/3/2025  7:30 AM [id number]   3/6/2025  1:15 PM Chaparro Richardson MD [facility] CLIN       JEREMIAH Rios, RN 10:22 AM 02/12/25    "

## 2025-02-13 ENCOUNTER — TELEPHONE (OUTPATIENT)
Dept: CARDIOLOGY | Facility: CLINIC | Age: 60
End: 2025-02-13
Payer: COMMERCIAL

## 2025-02-13 NOTE — TELEPHONE ENCOUNTER
Pt has been hearing a beeping coming from his life vest. Called [employer] to ensure no arrhythmias noted. Spoke with Smooth from [employer]. He looked pt up and no arrhythmias noted.     Smooth reports that pt's are instructed to call their 800 number for any questions or concerns regarding the device, it's function and any noises or shocks. They have reps that are available 24/7 and can confirm no arrhythmias logged and help deescalate anxious pts as these devices can cause pts anxiety of being shocked. He also reports that these patients are looked at 3-4 times a week to assess for arrhythmias even if no alert, beeping, or shock was given. They will update the clinic through fax if any arrhythmias seen and instruct pt to call provider.     Smooth, beeping can mean that the garments are loose and not fitting correctly. They need to be replaced every month or even sooner if pt has lost fluid weight. If garments are stretched or pt has lost weight, the garments, especially when in bed, changing positions, can move the garment causing a beeping sound. Smooth will mail pt out new garments.     Called pt No answerl Left message with no arrhythmias, beeping most likely mean new garments are needed and let him know that Smooth is mailing some. Instructed pt to reach out to the 800 number given as they have available staff 24/7 that can confirm arrhythmias, answer questions and concerns. YOHAN Archuleta

## 2025-02-14 ENCOUNTER — HOSPITAL ENCOUNTER (OUTPATIENT)
Dept: CARDIAC REHAB | Facility: CLINIC | Age: 60
Discharge: HOME | End: 2025-02-14
Attending: INTERNAL MEDICINE
Payer: COMMERCIAL

## 2025-02-14 PROCEDURE — 93798 PHYS/QHP OP CAR RHAB W/ECG: CPT

## 2025-02-17 ENCOUNTER — PATIENT OUTREACH (OUTPATIENT)
Dept: CARDIOLOGY | Facility: CLINIC | Age: 60
End: 2025-02-17
Payer: COMMERCIAL

## 2025-02-17 DIAGNOSIS — I42.8 NON-ISCHEMIC CARDIOMYOPATHY (H): ICD-10-CM

## 2025-02-17 DIAGNOSIS — I50.22 CHRONIC SYSTOLIC CONGESTIVE HEART FAILURE (H): Primary | ICD-10-CM

## 2025-02-17 NOTE — TELEPHONE ENCOUNTER
"[facility] - [facility] Clinic   Recommendations per Delicia: \"HR could be elevated from GI infection. Would encourage him to go to cardiac rehab tomorrow for reassurance. Let's see if we can get his echo pushed up sooner (any time). Thank you.\"    Call to pt. Discussed GI infection and elevated HRs. Encouraged pt to go to Cardiac Rehab tomorrow so VS and heart rhythm can be checked. Pt agreeable. Discussed echo being done sooner (if openings available). Pt states he is leaving to go to [facility] on 2/23 - 2/26. Pt asked if he \"should not go.\" Discussed recheck on VS/heart rhythm for tomorrow and will decide then. Pt feels confident with plan. Emotional re-assurance given.     Message to scheduled to see if echo can be moved up.      JEREMIAH Rios, RN 5:14 PM 02/17/25      "

## 2025-02-17 NOTE — TELEPHONE ENCOUNTER
"[facility]: Heart Failure Care Coordination   Follow-Up Outreach     Situation/Background:      Chief Complaint   Patient presents with    Clinic Care Coordination - Follow-up     Vomiting and elevated HRs      Pt left  requesting a call back to discuss vomiting and elevated HRs.      Current GDMT:   Empagliflozin 10 mg once daily  Metoprolol succinate 25 mg one daily  Entresto 24/26 mg twice daily     Current potassium chloride dose: None     Other pertinent information:   -- 25: CORE Return with Delicia Kc, PAC:  Volume: Appears well-compensated; however proBNP elevated at 1800 (up from 1K two months ago).  GDMT: jardiance 10, Losartan 12.5 BID, Toprol 25.  QRS: Wide - LBBB   Device: None - Lifevest in place  Plan:  Stop Losartan, start Entresto 12/13 mg BID. I instructed him to contact us with lightheadedness.   Repeat CMP, proBNP in one week.  Follow-up:  As scheduled with Dr. Richardson in March with TTE prior. Consider CRT-D if no significant EF improvement.  -- 25: Cardiac rehab  SR, BBB, PVC, PAC  Restin/60, HR 97  Max: 110/60,   End: 92/60,   -- 25: Pt called device regarding beeping from Life Vest. No arrhythmias noted.   -- 25: Cardiac rehab  SR, BBB, PVC  Restin/62, HR 97  Max: 120/60  End: 92/60, HR 93      Assessment:      Notes: Returned pt's call. Pt is very anxious regarding HRs. Reporting rates have been \"60s to 132\". Pt does have a FitBit watch, \"it shows that I have been active for 132 minutes and I only h ave been moving for about 60 mins. My heart rate is so high it must think I am exercising.\" Pt reports at rest his HRs is 60-65 \"but jumps up to 100s to 1teens when I move. It did get better after I took the metoprolol.\"  Pt reports BP today as 97/60. Denies SOB/CORREA, chest pressure/tightness. Does report \"palpitations. Especially when I see my heart rate.\"    Pt reports a \"stomach thing going on. It is coming out of both ends.\" Last episode was at 4 " "am. Pt reports he is able to keep medications down. He is having a \"very hard time sleeping. I am so worried about my heart failure\". Pt reports his weight is stable, \"I have actually lost a few pounds from not eating.\" Pt not able to report exact weight.  Denies fevers/chills. Pt reports r sided back pain, \"I did play hockey yesterday so it could muscle pain.\"   Pt reports he is staying hydrated, \"I'm drinking like 8 10-12 oz glasses a day and some more during the night.\"   Pt has not heard Life Vest alert since 2/13/25.    Last BMP 2/11/25: WNL     Recent home weights:   2/11       179.7 lbs        Recent blood pressures/HRs:   2/11      114/73, HR 71  2/17      97/60, HR \"60s - 100-1teens, sometimes it gets up to 132\"    Baseline weight: Well compensated per 2/11/25 note (home wt 179.7 lbs)         Remote monitoring: No          Intervention/Plan:      Message to device to see if any alerts were received on Life Vest.   Encouraged pt to have a banana, dry toast or something else light as he has not eaten since yesterday afternoon.     Patient to call/[employer] message with updates.  Patient to follow up as scheduled.  RN CC reviewed and reinforced fluid/dietary sodium restrictions; patient stated understanding.  Instructed patient to call RN line with new or worsening heart failure symptoms and/or rapid weight gain.  Confirmed patient has clinic and scheduling phone numbers.  RN CC routing to provider for review.    Future Appointments   Date Time Provider Department Center   2/18/2025  7:00 AM 1, Rh Cardiac Rehab [facility]   2/20/2025  8:00 AM RU LAB [facility]   3/3/2025  7:30 AM RSCCECHO2 [facility]   3/6/2025  1:15 PM Chaparro Richardson MD [facility]       JEREMIAH Rios, RN 10:27 AM 02/17/25    "

## 2025-02-17 NOTE — PROGRESS NOTES
HR could be elevated from GI infection.   Would encourage him to go to cardiac rehab tomorrow for reassurance.   Let's see if we can get his echo pushed up sooner (any time).   Thank you.

## 2025-02-18 ENCOUNTER — HOSPITAL ENCOUNTER (OUTPATIENT)
Dept: CARDIAC REHAB | Facility: CLINIC | Age: 60
Discharge: HOME | End: 2025-02-18
Attending: INTERNAL MEDICINE
Payer: COMMERCIAL

## 2025-02-18 PROCEDURE — 93798 PHYS/QHP OP CAR RHAB W/ECG: CPT

## 2025-02-18 PROCEDURE — 93797 PHYS/QHP OP CAR RHAB WO ECG: CPT

## 2025-02-18 NOTE — PROGRESS NOTES
[facility]   New Echo order placed with urgent status per scheduling request, they will call to offer sooner Echo in [place].     Pt had cardiac rehab at 7am. Note reviewed, see below. Messaged pt via [employer] asking for how he is feeling:     Thank you Lillie.  I'm feel okay -- better than yesterday.  My BP is on the lower side to today -- top number was 80 even after some light exercise.  My heart is not racing like it was in the middle of the night on Sunday/Monday, but do feel it beat/skip once in awhile (maybe just in my head).  Was mostly concerned about my heart rate not going down before, but sounds like that could be just related to me not feeling well.    Will wait to hear about a possible reschedule.  Thank you!     Harshil       Scheduling has LVM for pt offering sooner Echo. Update sent to MARCEL West for review. Pt questioning if he should be going to [place] 2/23-2/26 and is wondering if he should cancel trip.           Future Appointments   Date Time Provider Department Center   2/20/2025  8:00 AM RU LAB [facility]   3/3/2025  7:30 AM RSCCECHO2 [facility]   3/6/2025  1:15 PM Chaparro Richardson MD [facility]      Lillie Myers RN, BSN  02/18/25 at 11:16 AM

## 2025-02-19 NOTE — TELEPHONE ENCOUNTER
"[facility] - [facility] Clinic   Recommendations per Delicia Kc, PAC: \"BP was pretty low in cardiac rehab, lets have him hold the Entresto until his GI issues resolve.\"    Call to pt to discuss. Pt has taken morning medications already including Entresto. He agrees to start holding it until instructed to resume. Pt continues to have GI issues \"but it is getting better. I feel weak though.\"   Pt did check his BP this morning prior to medications: \"105 over something\".   Discussed repeat labs tomorrow(2/20). Pt \"anxious about the echo. Will someone call me right away with the results? Will there be any improvement since I only have been on Entresto for a week? How can they cardiologist see if the is an improvement? Do they compare echo to echo.\"    Discussed comparing echos and how Metoprolol (started 11/2024) and Jardiance (started 12/2024) can help improve ejection fraction. Pt \"realized.\" Pt asking if he will need a device. Discussed that ICD may be consider in the future. Discussed reason for Life Vest.     Pt comfortable with plan and recommendations. He will call [facility] if he has additional questions.     Note made to Entresto on med list that it is currently on hold.     Future Appointments   Date Time Provider Department Center   2/20/2025  8:00 AM RU LAB [facility]   2/21/2025 10:00 AM [id number] [facility]   3/3/2025  7:30 AM RSCCECHO2 [facility]   3/6/2025  1:15 PM Chaparro Richardson MD [facility]     JEREMIAH Rios, RN 9:34 AM 02/19/25    "

## 2025-02-20 ENCOUNTER — TELEPHONE (OUTPATIENT)
Dept: CARDIOLOGY | Facility: CLINIC | Age: 60
End: 2025-02-20

## 2025-02-20 ENCOUNTER — LAB (OUTPATIENT)
Dept: LAB | Facility: CLINIC | Age: 60
End: 2025-02-20
Payer: COMMERCIAL

## 2025-02-20 DIAGNOSIS — I50.22 CHRONIC SYSTOLIC CONGESTIVE HEART FAILURE (H): ICD-10-CM

## 2025-02-20 LAB
ALBUMIN SERPL BCG-MCNC: 4.4 G/DL (ref 3.5–5.2)
ALP SERPL-CCNC: 97 U/L (ref 40–150)
ALT SERPL W P-5'-P-CCNC: 76 U/L (ref 0–70)
ANION GAP SERPL CALCULATED.3IONS-SCNC: 11 MMOL/L (ref 7–15)
AST SERPL W P-5'-P-CCNC: 51 U/L (ref 0–45)
BILIRUB SERPL-MCNC: 0.8 MG/DL
BUN SERPL-MCNC: 10.9 MG/DL (ref 6–20)
CALCIUM SERPL-MCNC: 9.3 MG/DL (ref 8.8–10.4)
CHLORIDE SERPL-SCNC: 100 MMOL/L (ref 98–107)
CREAT SERPL-MCNC: 1.01 MG/DL (ref 0.67–1.17)
EGFRCR SERPLBLD CKD-EPI 2021: 86 ML/MIN/1.73M2
GLUCOSE SERPL-MCNC: 113 MG/DL (ref 70–99)
HCO3 SERPL-SCNC: 26 MMOL/L (ref 22–29)
NT-PROBNP SERPL-MCNC: 1079 PG/ML (ref 0–900)
POTASSIUM SERPL-SCNC: 3.8 MMOL/L (ref 3.4–5.3)
PROT SERPL-MCNC: 6.5 G/DL (ref 6.4–8.3)
SODIUM SERPL-SCNC: 137 MMOL/L (ref 135–145)

## 2025-02-20 NOTE — TELEPHONE ENCOUNTER
Health Call Center    Phone Message    May a detailed message be left on voicemail: yes     Reason for Call: Other: Sadie from [facility] called requesting to speak with Harshil's care team about a renewal form that was faxed a couple weeks ago. Please reach out to Sadie to discuss. Thank you!     Action Taken: Other: Cardiology    Travel Screening: Not Applicable    Thank you!  Specialty Access Center       Date of Service:

## 2025-02-20 NOTE — TELEPHONE ENCOUNTER
[facility] - [facility].E[facility] Clinic   Returned call to Sadie. Fax received and did let her know that Delicia is back in clinic on 2/21.   On 2/21, will fax signed form, OV note from 2/11 and echo completed on 2/21/25.    JEREMAIH Rios, RN 3:43 PM 02/20/25

## 2025-02-21 ENCOUNTER — ANCILLARY PROCEDURE (OUTPATIENT)
Dept: CARDIOLOGY | Facility: CLINIC | Age: 60
End: 2025-02-21
Attending: PHYSICIAN ASSISTANT
Payer: COMMERCIAL

## 2025-02-21 DIAGNOSIS — I50.22 CHRONIC SYSTOLIC CONGESTIVE HEART FAILURE (H): ICD-10-CM

## 2025-02-21 DIAGNOSIS — I42.8 NON-ISCHEMIC CARDIOMYOPATHY (H): ICD-10-CM

## 2025-02-21 LAB — LVEF ECHO: NORMAL

## 2025-02-21 PROCEDURE — 93325 DOPPLER ECHO COLOR FLOW MAPG: CPT | Performed by: STUDENT IN AN ORGANIZED HEALTH CARE EDUCATION/TRAINING PROGRAM

## 2025-02-21 PROCEDURE — 93308 TTE F-UP OR LMTD: CPT | Performed by: STUDENT IN AN ORGANIZED HEALTH CARE EDUCATION/TRAINING PROGRAM

## 2025-02-21 PROCEDURE — 93321 DOPPLER ECHO F-UP/LMTD STD: CPT | Performed by: STUDENT IN AN ORGANIZED HEALTH CARE EDUCATION/TRAINING PROGRAM

## 2025-02-21 RX ADMIN — Medication 6 ML: at 10:20

## 2025-02-24 DIAGNOSIS — I42.8 NON-ISCHEMIC CARDIOMYOPATHY (H): Primary | ICD-10-CM

## 2025-02-24 DIAGNOSIS — I44.7 LBBB (LEFT BUNDLE BRANCH BLOCK): ICD-10-CM

## 2025-02-24 DIAGNOSIS — I50.22 CHRONIC SYSTOLIC HEART FAILURE (H): ICD-10-CM

## 2025-02-24 RX ORDER — LIDOCAINE 40 MG/G
CREAM TOPICAL
OUTPATIENT
Start: 2025-02-24

## 2025-02-24 RX ORDER — CEFAZOLIN SODIUM 2 G/100ML
2 INJECTION, SOLUTION INTRAVENOUS
OUTPATIENT
Start: 2025-02-24

## 2025-02-24 RX ORDER — SODIUM CHLORIDE 450 MG/100ML
INJECTION, SOLUTION INTRAVENOUS CONTINUOUS
OUTPATIENT
Start: 2025-02-24

## 2025-02-24 NOTE — PROGRESS NOTES
Able to reach patient. Reviewed pre procedure instructions with pt. Questions answered.   Verb understanding of instructions.  Gabo ALMANZAR

## 2025-02-24 NOTE — PROGRESS NOTES
LVM for patient requesting he call back to the Device RN line to preview pre-procedure instructions listed below.  ELVIN RN    [facility] Device Clinic RN callback number: [phone]    _________________________________________________    Pre-procedure instructions for ICD implant on 2/28/25 at 3:00pm:     -Review arrival time of 1:00pm and location.     Nothing to eat starting 8 hours prior to arrival time (starting at 5:00am)  May have clear liquids up until 2 hours prior to arrival time (up until 11:00am)    Anticoagulation: eliquis - per Delicia Kc 2/21 OV note, hold 2 days prior to procedure  SGLT2 Inhibitors: jardiance - per Delicia Kc 2/21 OV note, hold 3 days prior to procedure    -Shower the morning of the procedure, and then put on a clean shirt in order to help prevent infection.     -Pt to call [facility] at [phone] if pt has any new COVID like symptoms or if feeling unwell the evening prior or AM of procedure.      -Post-procedure transportation and 24 hours monitoring set up. Please call before coming in if plans change.  With limited bed availability due to COVID, overnight hospital stays will be allowed for clinical reasons only.    -No driving for 24 hours post procedure due to sedation.     Pt verbalized understanding of instructions.     YOHAN Simons      Info for pre-procedure orders:    MRSA history?: no  Allergy to PCN or ancef?: no. Reaction: N/A  Contrast allergy?: no  Labs needed?: yes  EKG needed?: yes      Staff Message from :

## 2025-02-27 NOTE — PRE-PROCEDURE
GENERAL PRE-PROCEDURE:   Procedure:  BiV ICD implant    Written consent obtained?: Yes    Risks and benefits: Risks, benefits and alternatives were discussed    Consent given by:  Patient  Patient states understanding of procedure being performed: Yes    Patient's understanding of procedure matches consent: Yes    Procedure consent matches procedure scheduled: Yes    Expected level of sedation:  Moderate  Appropriately NPO:  Yes  ASA Class:  3  Mallampati  :  Grade 2- soft palate, base of uvula, tonsillar pillars, and portion of posterior pharyngeal wall visible  Lungs:  Lungs clear with good breath sounds bilaterally  Heart:  Normal heart sounds and rate  History & Physical reviewed:  History and physical reviewed and no updates needed  Statement of review:  I have reviewed the lab findings, diagnostic data, medications, and the plan for sedation    60 yo M with non-ischemic cardiomyopathy (EF 10-20%), LBBB, and family history of SCD in his father.

## 2025-02-28 ENCOUNTER — APPOINTMENT (OUTPATIENT)
Dept: GENERAL RADIOLOGY | Facility: CLINIC | Age: 60
End: 2025-02-28
Attending: INTERNAL MEDICINE
Payer: COMMERCIAL

## 2025-02-28 ENCOUNTER — HOSPITAL ENCOUNTER (OUTPATIENT)
Facility: CLINIC | Age: 60
Discharge: HOME | End: 2025-02-28
Attending: INTERNAL MEDICINE | Admitting: INTERNAL MEDICINE
Payer: COMMERCIAL

## 2025-02-28 VITALS
OXYGEN SATURATION: 94 % | TEMPERATURE: 97.3 F | HEIGHT: 70 IN | SYSTOLIC BLOOD PRESSURE: 110 MMHG | BODY MASS INDEX: 25.91 KG/M2 | WEIGHT: 181 LBS | HEART RATE: 72 BPM | RESPIRATION RATE: 18 BRPM | DIASTOLIC BLOOD PRESSURE: 66 MMHG

## 2025-02-28 DIAGNOSIS — I44.7 LBBB (LEFT BUNDLE BRANCH BLOCK): ICD-10-CM

## 2025-02-28 DIAGNOSIS — I42.8 NON-ISCHEMIC CARDIOMYOPATHY (H): ICD-10-CM

## 2025-02-28 DIAGNOSIS — I50.22 CHRONIC SYSTOLIC HEART FAILURE (H): ICD-10-CM

## 2025-02-28 LAB
ANION GAP SERPL CALCULATED.3IONS-SCNC: 8 MMOL/L (ref 7–15)
BUN SERPL-MCNC: 10 MG/DL (ref 6–20)
CALCIUM SERPL-MCNC: 9.3 MG/DL (ref 8.8–10.4)
CHLORIDE SERPL-SCNC: 103 MMOL/L (ref 98–107)
CREAT SERPL-MCNC: 1 MG/DL (ref 0.67–1.17)
EGFRCR SERPLBLD CKD-EPI 2021: 87 ML/MIN/1.73M2
ERYTHROCYTE [DISTWIDTH] IN BLOOD BY AUTOMATED COUNT: 12 % (ref 10–15)
GLUCOSE SERPL-MCNC: 83 MG/DL (ref 70–99)
HCO3 SERPL-SCNC: 29 MMOL/L (ref 22–29)
HCT VFR BLD AUTO: 43.9 % (ref 40–53)
HGB BLD-MCNC: 15 G/DL (ref 13.3–17.7)
MCH RBC QN AUTO: 30 PG (ref 26.5–33)
MCHC RBC AUTO-ENTMCNC: 34.2 G/DL (ref 31.5–36.5)
MCV RBC AUTO: 88 FL (ref 78–100)
PLATELET # BLD AUTO: 207 10E3/UL (ref 150–450)
POTASSIUM SERPL-SCNC: 4.4 MMOL/L (ref 3.4–5.3)
RBC # BLD AUTO: 5 10E6/UL (ref 4.4–5.9)
SODIUM SERPL-SCNC: 140 MMOL/L (ref 135–145)
WBC # BLD AUTO: 4.3 10E3/UL (ref 4–11)

## 2025-02-28 PROCEDURE — 999N000054 HC STATISTIC EKG NON-CHARGEABLE

## 2025-02-28 PROCEDURE — C1777 LEAD, AICD, ENDO SINGLE COIL: HCPCS | Performed by: INTERNAL MEDICINE

## 2025-02-28 PROCEDURE — 250N000013 HC RX MED GY IP 250 OP 250 PS 637: Performed by: INTERNAL MEDICINE

## 2025-02-28 PROCEDURE — 33225 L VENTRIC PACING LEAD ADD-ON: CPT | Performed by: INTERNAL MEDICINE

## 2025-02-28 PROCEDURE — C1887 CATHETER, GUIDING: HCPCS | Performed by: INTERNAL MEDICINE

## 2025-02-28 PROCEDURE — 33249 INSJ/RPLCMT DEFIB W/LEAD(S): CPT | Performed by: INTERNAL MEDICINE

## 2025-02-28 PROCEDURE — 258N000002 HC RX IP 258 OP 250: Performed by: INTERNAL MEDICINE

## 2025-02-28 PROCEDURE — C1769 GUIDE WIRE: HCPCS | Performed by: INTERNAL MEDICINE

## 2025-02-28 PROCEDURE — 93005 ELECTROCARDIOGRAM TRACING: CPT

## 2025-02-28 PROCEDURE — 36415 COLL VENOUS BLD VENIPUNCTURE: CPT | Performed by: INTERNAL MEDICINE

## 2025-02-28 PROCEDURE — 999N000065 XR CHEST 2 VIEWS

## 2025-02-28 PROCEDURE — 999N000184 HC STATISTIC TELEMETRY

## 2025-02-28 PROCEDURE — 99153 MOD SED SAME PHYS/QHP EA: CPT | Performed by: INTERNAL MEDICINE

## 2025-02-28 PROCEDURE — C1900 LEAD, CORONARY VENOUS: HCPCS | Performed by: INTERNAL MEDICINE

## 2025-02-28 PROCEDURE — 85014 HEMATOCRIT: CPT | Performed by: INTERNAL MEDICINE

## 2025-02-28 PROCEDURE — 250N000009 HC RX 250: Performed by: INTERNAL MEDICINE

## 2025-02-28 PROCEDURE — 250N000011 HC RX IP 250 OP 636: Performed by: INTERNAL MEDICINE

## 2025-02-28 PROCEDURE — C1894 INTRO/SHEATH, NON-LASER: HCPCS | Performed by: INTERNAL MEDICINE

## 2025-02-28 PROCEDURE — 80048 BASIC METABOLIC PNL TOTAL CA: CPT | Performed by: INTERNAL MEDICINE

## 2025-02-28 PROCEDURE — 99152 MOD SED SAME PHYS/QHP 5/>YRS: CPT | Performed by: INTERNAL MEDICINE

## 2025-02-28 PROCEDURE — 82374 ASSAY BLOOD CARBON DIOXIDE: CPT | Performed by: INTERNAL MEDICINE

## 2025-02-28 PROCEDURE — 999N000071 HC STATISTIC HEART CATH LAB OR EP LAB

## 2025-02-28 PROCEDURE — 255N000002 HC RX 255 OP 636: Performed by: INTERNAL MEDICINE

## 2025-02-28 PROCEDURE — C1882 AICD, OTHER THAN SING/DUAL: HCPCS | Performed by: INTERNAL MEDICINE

## 2025-02-28 PROCEDURE — 272N000001 HC OR GENERAL SUPPLY STERILE: Performed by: INTERNAL MEDICINE

## 2025-02-28 PROCEDURE — C1898 LEAD, PMKR, OTHER THAN TRANS: HCPCS | Performed by: INTERNAL MEDICINE

## 2025-02-28 PROCEDURE — C1730 CATH, EP, 19 OR FEW ELECT: HCPCS | Performed by: INTERNAL MEDICINE

## 2025-02-28 DEVICE — DEFIB ICD COBALT XT TITANIUM 74X51X13MM DTPA2QQ: Type: IMPLANTABLE DEVICE | Status: FUNCTIONAL

## 2025-02-28 DEVICE — LEAD ACTIVE DF4 6935M-62CM: Type: IMPLANTABLE DEVICE | Status: FUNCTIONAL

## 2025-02-28 DEVICE — IMP LEAD PACING BIPOLAR CAPSUREFIX NOVUS 52CM 5076-52: Type: IMPLANTABLE DEVICE | Status: FUNCTIONAL

## 2025-02-28 DEVICE — IMPLANTABLE DEVICE: Type: IMPLANTABLE DEVICE | Status: FUNCTIONAL

## 2025-02-28 RX ORDER — CEFAZOLIN SODIUM 2 G/50ML
2 SOLUTION INTRAVENOUS
Status: COMPLETED | OUTPATIENT
Start: 2025-02-28 | End: 2025-02-28

## 2025-02-28 RX ORDER — NALOXONE HYDROCHLORIDE 0.4 MG/ML
0.4 INJECTION, SOLUTION INTRAMUSCULAR; INTRAVENOUS; SUBCUTANEOUS
Status: DISCONTINUED | OUTPATIENT
Start: 2025-02-28 | End: 2025-03-01 | Stop reason: HOSPADM

## 2025-02-28 RX ORDER — LIDOCAINE 40 MG/G
CREAM TOPICAL
Status: DISCONTINUED | OUTPATIENT
Start: 2025-02-28 | End: 2025-02-28 | Stop reason: HOSPADM

## 2025-02-28 RX ORDER — NALOXONE HYDROCHLORIDE 0.4 MG/ML
0.2 INJECTION, SOLUTION INTRAMUSCULAR; INTRAVENOUS; SUBCUTANEOUS
Status: DISCONTINUED | OUTPATIENT
Start: 2025-02-28 | End: 2025-03-01 | Stop reason: HOSPADM

## 2025-02-28 RX ORDER — FENTANYL CITRATE 50 UG/ML
INJECTION, SOLUTION INTRAMUSCULAR; INTRAVENOUS
Status: DISCONTINUED | OUTPATIENT
Start: 2025-02-28 | End: 2025-02-28 | Stop reason: HOSPADM

## 2025-02-28 RX ORDER — SODIUM CHLORIDE 450 MG/100ML
INJECTION, SOLUTION INTRAVENOUS CONTINUOUS
Status: DISCONTINUED | OUTPATIENT
Start: 2025-02-28 | End: 2025-02-28 | Stop reason: HOSPADM

## 2025-02-28 RX ORDER — ACETAMINOPHEN 325 MG/1
650 TABLET ORAL ONCE
Status: COMPLETED | OUTPATIENT
Start: 2025-02-28 | End: 2025-02-28

## 2025-02-28 RX ORDER — BUPIVACAINE HYDROCHLORIDE 2.5 MG/ML
INJECTION, SOLUTION EPIDURAL; INFILTRATION; INTRACAUDAL; PERINEURAL
Status: DISCONTINUED | OUTPATIENT
Start: 2025-02-28 | End: 2025-02-28 | Stop reason: HOSPADM

## 2025-02-28 RX ADMIN — CEFAZOLIN SODIUM 2 G: 2 SOLUTION INTRAVENOUS at 16:30

## 2025-02-28 RX ADMIN — SODIUM CHLORIDE: 4.5 INJECTION, SOLUTION INTRAVENOUS at 13:49

## 2025-02-28 RX ADMIN — CEFAZOLIN SODIUM 2 G: 2 SOLUTION INTRAVENOUS at 13:45

## 2025-02-28 RX ADMIN — ACETAMINOPHEN 650 MG: 325 TABLET, FILM COATED ORAL at 19:36

## 2025-02-28 ASSESSMENT — ACTIVITIES OF DAILY LIVING (ADL)
ADLS_ACUITY_SCORE: 52

## 2025-02-28 NOTE — Clinical Note
Tested the left ventricular lead. See vendor printout/MD dictation. 88CM ATTAIN PERFORMA QUADRIPOLAR TRANSVENOUS LEAD MODEL 7057 STRAIGHT DUPLICATE, PLEASE TRANSITION TO [id number]

## 2025-02-28 NOTE — Clinical Note
Tested the left ventricular lead. See vendor printout/MD dictation. 88CM ATTAIN PERFORMA QUADRIPOLAR TRANSVENOUS LEAD MODEL 4400 STRAIGHT DUPLICATE, PLEASE TRANSITION TO [id number]

## 2025-02-28 NOTE — PROGRESS NOTES
[facility] Post Procedure Note    Patient Information  Name: Clement Robert  Age: 59 year old  BiV ICD placement     Post Procedure  Time patient returned to [facility]: ***  Concerns/abnormal assessment: ***  If abnormal assessment, provider notified: No  Plan/Other: post procedure orders, jocelin Goldberg RN

## 2025-02-28 NOTE — PROGRESS NOTES
[facility] Discharge Nursing Note    Patient Information  Name: Clement Robert  Age: 59 year old    Discharge Education:  Discharge instructions reviewed: Yes  Additional education/resources provided: ***  Patient/patient representative verbalizes understanding: Yes  Patient discharging on new medications: No  Medication education completed: N/A    Discharge Plans:   Discharge location: home  Discharge ride contacted: Yes - spouse Marina at bedside   Approximate discharge time: ***    Discharge Criteria:  Discharge criteria met and vital signs stable: Yes    Patient Belongs:  Patient belongings returned to patient: Yes    Priya Goldberg RN

## 2025-02-28 NOTE — PROGRESS NOTES
[facility] Admission Nursing Note    Patient Information  Name: Clement Robert  Age: 59 year old  Reason for admission: ICD placement   [facility] arrival time: 1305     Visitor Information  Name: Marina - Spouse      Patient Admission/Assessment   Pre-procedure assessment complete: Yes  If abnormal assessment/labs, provider notified: N/A  NPO: Yes  Medications held per instructions/orders: Yes  Consent: with MD   If applicable, pregnancy test status: male pt   Patient oriented to room: Yes  Education/questions answered: Yes  Plan/other: ICD placement, ABX, fluids     Discharge Planning  Discharge name/phone number: [phone]  Overnight post sedation caregiver: Marina spouse   Discharge location: [place]    Priya Goldberg RN

## 2025-02-28 NOTE — DISCHARGE INSTRUCTIONS
ICD Implant Discharge Instructions     After you go home:    Have an adult stay with you until tomorrow.  You may resume your normal diet.       For 24 hours - due to the sedation you received:  Relax and take it easy.  Do NOT make any important or legal decisions.  Do NOT drive or operate machines at home or at work.  Do NOT drink alcohol.    Care of Chest Incision:    Keep the Aquacel (tan rectangular) dressing on until your return appointment. The dressing will be removed by the device nurse.   If there is a pressure dressing (gauze & tape) - 24 hours after your procedure you may remove ONLY the top dressing. Leave the bottom Aquacel dressing on.  Leave the strips of tape on. They will fall off on their own.  Check your wound daily for signs of infection, such as increased redness, severe swelling or draining. Fever may also be a sign of infection. Call us if you see any of these signs.  You may shower with the Aquacel (waterproof) dressing in place the morning after your procedure.  Do not scrub on top of the dressing & avoid peeling the dressing off. If you take a tub bath, keep the dressing dry. If the edges of the dressing are peeling off, do NOT shower & contact the clinic for further instruction.  No soaking the incision (swimming pool, bathtub, hot tub) until you are cleared at your 6-8 week check.  Never put any creams, lotions, powders or peroxide on your incision area unless your doctor tells you to.  You may have mild to medium pain for 3 to 5 days. Take Acetaminophen (Tylenol) or Ibuprofen (Advil) for the pain. If the pain persists or is severe, call us.    Activity:    For at least 2 weeks: Do not raise your elbow above your shoulder. You can begin to use your arm as it feels comfortable to you.  Do not use arm on implant side to lift more than 10 pounds for the first week.  For 4 weeks: Refrain from strenuous sports - tennis, pickle ball, basketball, golf or weight lifting.  Ask your doctor when you  can expect to return to work.    Bleeding:    If you start bleeding from the incision site, sit down and press firmly on the site for 10 minutes.   Once bleeding stops, call [facility] as soon as you can.       Call 911 right away if you have heavy bleeding or bleeding that does not stop.      Medicines:    Take your medications, including blood thinners, unless your provider tells you not to.  If you have stopped any medicines, check with your provider about when to restart them.    Follow Up Appointments:    Follow up with Device Clinic at [facility] of patient preference as scheduled.    Call the clinic if:    You have a large or growing hard lump around the site.  The site is red, swollen, hot or tender.  Blood or fluid is draining from the site.  You have chills or a fever greater than 100.4 F (38 C).  You feel dizzy or light-headed.  Chest pain  Lack of energy  Fainting spells  Twitching chest muscles  Rapid pulse or pounding heartbeat  Shortness of breath  Increased pain around your defibrillator  Hiccups that won't stop  Questions or concerns    Telling others about your device:    Before you leave the hospital, you will receive a temporary ID card. A permanent card will be mailed to you about 6 to 8 weeks later. Always carry the ID card with you. It has important details about your device.  You may also get a Medical Alert bracelet or tag that says you have a pacemaker.  Go to [email or web address].   Always tell doctors, dentists and other care providers that you have a device implanted in you.  Let us know before you plan any surgeries. Your care team must take special steps to keep you safe during certain procedures. These steps will depend on the type of device you have. Your provider will need to see your ID card. They may need to call us for instructions.    Device Safety:    Please refer to device  s booklet for further information.  Keep your cell phone away from your pacemaker.  Don't carry the phone in your shirt pocket, even when it is turned off.  Avoid strong magnets - MRIs & hand-held security wands.  Avoid strong electrical fields - radio transmitting towers, ham radios, & heavy duty electrical equipment.  Avoid leaning over the open damian of a running car.        [facility] in [place]: [phone]   Device Clinic (Mon-Fri 8am-4pm) [phone]  Or you may contact your provider via My Chart    The device clinic is closed on weekends & holidays.  Any calls received during this time will be answered on the next business day.  For any urgent questions after hours, please call the main clinic number and you will be put in contact with the cardiologist on call.

## 2025-03-01 NOTE — PLAN OF CARE
A/O, VSS, All discharge instructions, prescriptions, and personal belongings given to pt. All questions answered. Pt d/c to home with significant other via wheelchair.

## 2025-03-01 NOTE — PROGRESS NOTES
Patient arrived from cath lab at 1845 post ICD placement, VSS, neuros intact, PIV saline locked, [employer] at bedside with patient., patient tolerating water, bedrest for one hour, patient needs chest x-ray and EKG, patient to discharge this evening, continue to monitor and follow POC

## 2025-03-02 ENCOUNTER — MYC MEDICAL ADVICE (OUTPATIENT)
Dept: CARDIOLOGY | Facility: CLINIC | Age: 60
End: 2025-03-02
Payer: COMMERCIAL

## 2025-03-03 ENCOUNTER — TELEPHONE (OUTPATIENT)
Dept: CARDIOLOGY | Facility: CLINIC | Age: 60
End: 2025-03-03
Payer: COMMERCIAL

## 2025-03-03 DIAGNOSIS — Z95.810 ICD (IMPLANTABLE CARDIOVERTER-DEFIBRILLATOR) IN PLACE: Primary | ICD-10-CM

## 2025-03-03 LAB
ATRIAL RATE - MUSE: 62 BPM
ATRIAL RATE - MUSE: 72 BPM
DIASTOLIC BLOOD PRESSURE - MUSE: NORMAL MMHG
DIASTOLIC BLOOD PRESSURE - MUSE: NORMAL MMHG
INTERPRETATION ECG - MUSE: NORMAL
INTERPRETATION ECG - MUSE: NORMAL
P AXIS - MUSE: 37 DEGREES
P AXIS - MUSE: 44 DEGREES
PR INTERVAL - MUSE: 150 MS
PR INTERVAL - MUSE: 170 MS
QRS DURATION - MUSE: 152 MS
QRS DURATION - MUSE: 180 MS
QT - MUSE: 472 MS
QT - MUSE: 500 MS
QTC - MUSE: 507 MS
QTC - MUSE: 516 MS
R AXIS - MUSE: -18 DEGREES
R AXIS - MUSE: 87 DEGREES
SYSTOLIC BLOOD PRESSURE - MUSE: NORMAL MMHG
SYSTOLIC BLOOD PRESSURE - MUSE: NORMAL MMHG
T AXIS - MUSE: -51 DEGREES
T AXIS - MUSE: 9 DEGREES
VENTRICULAR RATE- MUSE: 62 BPM
VENTRICULAR RATE- MUSE: 72 BPM

## 2025-03-03 NOTE — TELEPHONE ENCOUNTER
Eh Castaneda, he can continue to hold the Entresto until he sees Dr. Richardson later this week for reassessment, and consider re-starting it then. Thanks!

## 2025-03-03 NOTE — TELEPHONE ENCOUNTER
Pt called and had questions about taking his Entresto.  Stated that his last visit with Delicia Kc, he was holding this medication and when he was discharged on Friday he was told to resume it.     Will send a message to Delicia to clarify whether or not she would like to have pt resume Entresto or not.

## 2025-03-03 NOTE — TELEPHONE ENCOUNTER
Post device implant discharge phone call.Implantation of cardiac resynchronization (biventricular) ICD 2/28/25 Dr. Back    Reviewed the following:  - No raising arm above shoulder on the side of implant for 2 weeks (until 3/14/25)  - For new implants, no lifting >10 pounds for the first week (does not apply to generator changes or loop implants)  - For strenuous sports such as tennis, pickle ball, basketball, golf, or weight lifting wait 4 weeks to ensure stable lead healing.   - If there is a pressure dressing in place, this can be removed after 24 hours.   - Leave Aquacel dressing in place.  Okay to shower the day after the procedure.  If this begins to peel up, contact the device clinic.    - Aquacel dressing and steri-strips will be removed at 1 week device check, as appropriate  - Limit driving for: N/A  - Watch for redness, drainage, warmth, or fever. Call device clinic if any signs of infection.   - No soaking in bath tub, swimming pool or hot tub until you are cleared at your 6-8 week check    1 week device check scheduled: 3/12/25    Pt states understanding of all instructions and will call with any questions.  Device clinic phone number provided ([phone]).

## 2025-03-06 ENCOUNTER — OFFICE VISIT (OUTPATIENT)
Dept: CARDIOLOGY | Facility: CLINIC | Age: 60
End: 2025-03-06
Attending: PHYSICIAN ASSISTANT
Payer: COMMERCIAL

## 2025-03-06 ENCOUNTER — TELEPHONE (OUTPATIENT)
Dept: CARDIOLOGY | Facility: CLINIC | Age: 60
End: 2025-03-06

## 2025-03-06 VITALS
SYSTOLIC BLOOD PRESSURE: 114 MMHG | DIASTOLIC BLOOD PRESSURE: 75 MMHG | WEIGHT: 185 LBS | BODY MASS INDEX: 26.48 KG/M2 | HEART RATE: 90 BPM | OXYGEN SATURATION: 98 % | HEIGHT: 70 IN

## 2025-03-06 DIAGNOSIS — I48.0 PAROXYSMAL A-FIB (H): ICD-10-CM

## 2025-03-06 DIAGNOSIS — I50.22 CHRONIC SYSTOLIC CONGESTIVE HEART FAILURE (H): ICD-10-CM

## 2025-03-06 DIAGNOSIS — I42.8 NON-ISCHEMIC CARDIOMYOPATHY (H): Primary | ICD-10-CM

## 2025-03-06 RX ORDER — LOSARTAN POTASSIUM 25 MG/1
12.5 TABLET ORAL 2 TIMES DAILY
Qty: 90 TABLET | Refills: 3 | Status: SHIPPED | OUTPATIENT
Start: 2025-03-06 | End: 2025-03-06

## 2025-03-06 RX ORDER — LOSARTAN POTASSIUM 25 MG/1
12.5 TABLET ORAL DAILY
Qty: 45 TABLET | Refills: 3 | Status: SHIPPED | OUTPATIENT
Start: 2025-03-06

## 2025-03-06 RX ORDER — SPIRONOLACTONE 25 MG/1
12.5 TABLET ORAL DAILY
Qty: 45 TABLET | Refills: 3 | Status: SHIPPED | OUTPATIENT
Start: 2025-03-06

## 2025-03-06 NOTE — TELEPHONE ENCOUNTER
[facility] Call Center    Phone Message    May a detailed message be left on voicemail: yes     Reason for Call: Appointment Intake    Referring Provider Name: Dr. Richardson  Diagnosis and/or Symptoms: Non-ischemic cardiomyopathy    Mallory would like clinic to reach out to patient when ready to schedule. Thank you     Action Taken: Other: cardiology     Travel Screening: Not Applicable    Thank you!  Specialty Access Center       Date of Service:

## 2025-03-06 NOTE — LETTER
"3/6/2025    Han Fuller MD  [address] [facility] [address]    RE: Clement J Jakob       Dear Colleague,     I had the pleasure of seeing Clement Robert in the [facility].      Cardiology Clinic Progress Note:    March 6, 2025   Patient Name: Clement Robert  Patient MRN: [id number]     Consult indication: CHF    HPI:    I had the opportunity to see patient Clement Robert in cardiology clinic for a follow up visit. Patient is followed by our colleague Han Fuller MD with Primary Care.     As you know patient is a pleasant 59-year-old male with a past medical history significant for heart failure with reduced ejection fraction secondary to dilated nonischemic cardiomyopathy (LVEF approximately 20%), chronic left bundle branch block status post CRT-D, nonobstructive CAD, remote history of paroxysmal atrial fibrillation, FH of SCD, who presents for follow-up.    Patient has a remote history of atrial fibrillation, had been seen by my colleagues Dr. Saba and Kerry Barcenas in 2011.  At that time he had recently been diagnosed with atrial fibrillation, underwent cardioversion.  Coronary CTA 11/2011 calcium score 0, some mild soft plaque in the left circumflex artery.  Stress echocardiogram 2009 negative.  BDS0AT2-MBJr was 0, and so was on aspirin monotherapy at that time.  Started on simvastatin as well.  Since then Lexiscan stress test 3/16/2020 was negative for inducible myocardial ischemia or infarction.  He had a left bundle branch block at that time.  Family history notable for father dying from a \"heart attack\" in his 40s.    I had seen him when he was hospitalized at [facility] with heart failure symptoms 11/19/2024.  TTE demonstrated LVEF 20 to 25% with severe global hypokinesis, severe LV dilatation with LVIDD 7.7 cm.  Coronary angiogram 11/20/2024 demonstrated only mild atheromatous changes.  Patient was started on apixaban and GDMT for " heart failure.  Discharged with a LifeVest due to ventricular ectopy on telemetry.      Cardiac MRI 12/19/2024 severe LV dilatation, LVEF 17%, severe global hypokinesis with paradoxical septal motion consistent with LBBB.  RVEF 56%.  No significant valve disease.  No evidence of myocardial edema on T2 weighted imaging.  No evidence of myocardial iron overload.  Late gadolinium enhancement imaging was negative for evidence of MI, fibrosis, infiltrative disease.    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    Has been followed closely by my colleague Delicia Kc, reviewed the thorough clinic note from 2/21/2025.  Patient underwent CRT-D implantation 2/28/2025.  GDMT for heart failure has been limited by blood pressure, had been on half tablet of Entresto at level 1 dosing twice daily, however became hypotensive.  Tolerating metoprolol, Jardiance.     Overall patient reports that he feels quite well.  Completed cardiac rehab.  He is back at work, works at the [employer].  Denies any chest pain, chest pressure.  Mild dyspnea but significantly improved from hospitalization.  No abnormal lower extremity swelling, weight gain, symptoms of orthopnea/PND.    Assessment and Plan/Recommendations:    # HFrEF 2/2 dilated NICM, possibly familial, LVEF 17% on CMR 12/2024. NYHA II. Euvolemic on exam, weight 185 lb. 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   # Chronic LBBB, s/p CRT-D 2/28/2025  # Paroxysmal atrial fibrillation, on apixaban  # FH of SCD (father in his 40s)     - Reviewed cardiac history in detail  - Due to hypotension, Entresto was discontinued.  BP today in clinic 114/75 mmHg.  Will trial losartan 12.5 mg daily, and spironolactone 12.5 mg daily.  Advised to monitor for signs/symptoms of hypotension  - BMP in 2 weeks  - Continue metoprolol succinate, Jardiance   - Follow up with Device Clinic  - Referral to our colleagues with Advanced  Heart Failure at [facility] to establish care, will continue to follow here as well      Thank you for allowing our team to participate in the care of Clement Robert.  Please do not hesitate to call or page me with any questions or concerns.    Sincerely,     Chaparro Richardson MD, [facility]  Cardiology  Text Page   March 6, 2025    cc  Delicia Kc, PA-C  [address]  [place] [address]    Voice recognition software utilized.     Total time spent on this encounter today: Greater than 40 minutes, providing care in this encounter including, but not limited to, reviewing prior medical records, laboratory data, imaging studies, diagnostic studies, procedure notes, formulating an assessment and plan, recommendations, discussion and counseling with patient face to face, dictation.    The longitudinal plan of care for the diagnosis(es)/condition(s) as documented were addressed during this visit. Due to the added complexity in care, I will continue to support Harshil in the subsequent management and with ongoing continuity of care.     Past Medical History:   The ASCVD Risk score (Halfway DK, et al., 2019) failed to calculate for the following reasons:    The valid total cholesterol range is 130 to 320 mg/dL  Past Medical History:   Diagnosis Date     Anxiety      Non-ischemic cardiomyopathy (H) 03/06/2025     Paroxysmal A-fib (H) 03/06/2025     Seizures (H)         Past Surgical History:   Past Surgical History:   Procedure Laterality Date     CV CORONARY ANGIOGRAM N/A 11/20/2024    Procedure: Coronary Angiogram;  Surgeon: Dominick Das MD;  Location: [facility] CARDIAC CATH LAB     EP ICD INSERT BIVENT N/A 2/28/2025    Procedure: Implantable Cardioverter Defibrillator Device & Lead Implant Biventricular;  Surgeon: Talat Back MD;  Location:  HEART CARDIAC CATH LAB       Medications (outpatient):  Current Outpatient Medications   Medication Sig Dispense Refill     apixaban ANTICOAGULANT (ELIQUIS)  "5 MG tablet Take 1 tablet (5 mg) by mouth 2 times daily. 180 tablet 3     busPIRone (BUSPAR) 5 MG tablet Take 10 mg by mouth at bedtime. Rx managed by Neurology       busPIRone (BUSPAR) 5 MG tablet Take 5 mg by mouth every morning. Rx managed by Neurology       empagliflozin (JARDIANCE) 10 MG TABS tablet Take 1 tablet (10 mg) by mouth daily. 90 tablet 1     levETIRAcetam (KEPPRA) 500 MG tablet Take 1,000 mg by mouth at bedtime. Rx managed by Neurology       levETIRAcetam (KEPPRA) 500 MG tablet Take 500 mg by mouth every morning.  6     losartan (COZAAR) 25 MG tablet Take 0.5 tablets (12.5 mg) by mouth daily. 45 tablet 3     metoprolol succinate ER (TOPROL XL) 25 MG 24 hr tablet Take 1 tablet (25 mg) by mouth daily. 90 tablet 1     nortriptyline (PAMELOR) 75 MG capsule TAKE 1 CAPSULE BY MOUTH AT BEDTIME*       rosuvastatin (CRESTOR) 20 MG tablet Take 1 tablet (20 mg) by mouth at bedtime. 90 tablet 3     spironolactone (ALDACTONE) 25 MG tablet Take 0.5 tablets (12.5 mg) by mouth daily. 45 tablet 3       Allergies:  No Known Allergies    Social History:   History   Drug Use No      History   Smoking Status     Never   Smokeless Tobacco     Never     Social History    Substance and Sexual Activity      Alcohol use: Yes        Comment: occ       Family History:  Family History   Problem Relation Age of Onset     Family History Negative Mother      Family History Negative Father        Review of Systems:   A complete review of systems was negative except as mentioned in the History of Present Illness.     Objective & Physical Exam:  /75   Pulse 90   Ht 1.778 m (5' 10\")   Wt 83.9 kg (185 lb)   SpO2 98%   BMI 26.54 kg/m    Wt Readings from Last 2 Encounters:   03/06/25 83.9 kg (185 lb)   02/28/25 82.1 kg (181 lb)     Body mass index is 26.54 kg/m .   Body surface area is 2.04 meters squared.    Constitutional: appears stated age, in no apparent distress, appears to be well nourished  Pulmonary: clear to " auscultation bilaterally, no wheezes, no rales, no increased work of breathing  Cardiovascular: JVP normal, regular rate, regular rhythm, no murmur appreciated, no lower extremity edema    Data reviewed:  Lab Results   Component Value Date    WBC 4.3 02/28/2025    WBC 8.5 11/18/2011    RBC 5.00 02/28/2025    RBC 4.90 11/18/2011    HGB 15.0 02/28/2025    HGB 15.3 11/18/2011    HCT 43.9 02/28/2025    HCT 43.2 11/18/2011    MCV 88 02/28/2025    MCV 88 11/18/2011    MCH 30.0 02/28/2025    MCH 31.2 11/18/2011    MCHC 34.2 02/28/2025    MCHC 35.4 11/18/2011    RDW 12.0 02/28/2025    RDW 12.1 11/18/2011     02/28/2025     11/18/2011     Sodium   Date Value Ref Range Status   02/28/2025 140 135 - 145 mmol/L Final   11/18/2011 137 133 - 144 mmol/L Final     Potassium   Date Value Ref Range Status   02/28/2025 4.4 3.4 - 5.3 mmol/L Final   11/18/2011 4.0 3.4 - 5.3 mmol/L Final     Chloride   Date Value Ref Range Status   02/28/2025 103 98 - 107 mmol/L Final   11/18/2011 104 94 - 109 mmol/L Final     Carbon Dioxide   Date Value Ref Range Status   11/18/2011 26 20 - 32 mmol/L Final     Carbon Dioxide (CO2)   Date Value Ref Range Status   02/28/2025 29 22 - 29 mmol/L Final     Anion Gap   Date Value Ref Range Status   02/28/2025 8 7 - 15 mmol/L Final   11/18/2011 7 6 - 17 mmol/L Final     Glucose   Date Value Ref Range Status   02/28/2025 83 70 - 99 mg/dL Final   11/18/2011 96 60 - 99 mg/dL Final     Urea Nitrogen   Date Value Ref Range Status   02/28/2025 10.0 6.0 - 20.0 mg/dL Final   11/18/2011 12 5 - 24 mg/dL Final     Creatinine   Date Value Ref Range Status   02/28/2025 1.00 0.67 - 1.17 mg/dL Final   11/18/2011 0.74 0.66 - 1.25 mg/dL Final     GFR Estimate   Date Value Ref Range Status   02/28/2025 87 >60 mL/min/1.73m2 Final     Comment:     eGFR calculated using 2021 CKD-EPI equation.   11/18/2011 >90 >60 mL/min/1.7m2 Final     Calcium   Date Value Ref Range Status   02/28/2025 9.3 8.8 - 10.4 mg/dL Final    2011 8.7 8.5 - 10.4 mg/dL Final     Bilirubin Total   Date Value Ref Range Status   2025 0.8 <=1.2 mg/dL Final     Alkaline Phosphatase   Date Value Ref Range Status   2025 97 40 - 150 U/L Final     ALT   Date Value Ref Range Status   2025 76 (H) 0 - 70 U/L Final   2011     34 2-40f 2-60m u/l Final   2011 34 3 - 60 U/L Final     AST   Date Value Ref Range Status   2025 51 (H) 0 - 45 U/L Final   2010 41 0 - 55 U/L Final     Recent Labs   Lab Test 25  1200 24  0642   CHOL 112 219*   HDL 54 65   LDL 44 145*   TRIG 69 47      Lab Results   Component Value Date    A1C 5.1 2024        Recent Results (from the past 4320 hours)   Echocardiogram Limited   Result Value    LVEF  10-20% (severely reduced)    [facility]    [id number]  [id number]  [id number]  [id number]^ANINE^OSWALD^NOMI     [facility]  Diagnostic and Treatment-3rd Floor  [address] [place] [id number]     Name: LUNA JACOBSON  MRN: [id number]  : 1965  Study Date: 2025 09:53 AM  Age: 59 yrs  Gender: Male  Patient Location: [facility]  Reason For Study: Chronic systolic congestive heart failure (H), Non-ischemic  card  Ordering Physician: OSWALD VALENCIA  Referring Physician: OSWALD VALENCIA  Performed By: Jose Arellano     BSA: 2.0 m2  Height: 70 in  Weight: 186 lb  ______________________________________________________________________________  Procedure  Contrast Optison. Limited Echocardiogram with two-dimensional, color and  spectral Doppler. Optison (NDC #7750-6901-76) given intravenously. Patient was  given 6 ml mixture of 3 ml Optison and 6 ml saline. 3 ml wasted.  ______________________________________________________________________________  Interpretation Summary  Left ventricular function is decreased. The ejection fraction is 10-20%  (severely reduced). Severe left ventricular dilation is present.  Severe  diffuse hypokinesis is present.  Global right ventricular function is mildly reduced.  Mild mitral insufficiency is present.  Mild tricuspid insufficiency is present.  No pericardial effusion is present.     This study was compared with the study from 24, on direct comparison  LVEF is largely stable. .  ______________________________________________________________________________  Left Ventricle  Severe left ventricular dilation is present. Left ventricular function is  decreased. The ejection fraction is 10-20% (severely reduced). Severe diffuse  hypokinesis is present.     Right Ventricle  Global right ventricular function is mildly reduced.     Mitral Valve  Mild mitral insufficiency is present. The cause of the mitral insufficiency  appears to be altered left ventricular size and geometry.     Aortic Valve  The aortic valve is tricuspid.     Tricuspid Valve  Mild tricuspid insufficiency is present. The right ventricular systolic  pressure is 27mmHg above the right atrial pressure.     Pericardium  No pericardial effusion is present.     Compared to Previous Study  This study was compared with the study from 24, on direct comparison  LVEF is largely stable. .     ______________________________________________________________________________  MMode/2D Measurements & Calculations  LVIDd: 7.6 cm  LVIDs: 7.0 cm  LVPWd: 1.1 cm  FS: 8.1 %  LVOT diam: 2.3 cm  LVOT area: 4.1 cm2     EF(MOD-bp): 14.1 %  RWT: 0.28     ______________________________________________________________________________  Report approved by: Lu Juarez Dr on 2025 11:57 AM         Echocardiogram Complete   Result Value    Biplane LVEF 22%    Narrative    [id number]  [id number]  [id number]  [id number]^TRACI^GIACOMO^[facility]  Echocardiography Laboratory  [address] [place] [address]     Name: LUNA JACOBSON  MRN: [id number]  : 1965  Study Date: 2024 09:28  AM  Age: 59 yrs  Gender: Male  Patient Location: [facility]  Reason For Study: Heart Failure  Ordering Physician: GIACOMO AVILES  Performed By: Paola Penny     BSA: 2.1 m2  Height: 70 in  Weight: 196 lb  BP: 123/85 mmHg  ______________________________________________________________________________  Procedure  Complete Portable Echo Adult. Optison (NDC #[id number]) given intravenously.  ______________________________________________________________________________  Interpretation Summary     Left ventricular systolic function is severely reduced.Biplane LVEF is  22%.There is severe global hypokinesia of the left ventricle.The left  ventricle is severely dilated.  The right ventricular systolic function is normal.  There is mild (1+) mitral regurgitation.  IVC diameter <2.1 cm collapsing >50% with sniff suggests a normal RA pressure  of 3 mmHg.     Stress echo dated 02/4/2009 noted normal LVEF at rest  ______________________________________________________________________________  Left Ventricle  The left ventricle is severely dilated. There is normal left ventricular wall  thickness. Left ventricular systolic function is severely reduced. Biplane  LVEF is 22%. There is severe global hypokinesia of the left ventricle.     Right Ventricle  The right ventricle is normal size. The right ventricular systolic function is  normal.     Atria  The left atrium is mild to moderately dilated. Right atrial size is normal.  There is no color Doppler evidence of an atrial shunt.     Mitral Valve  There is mild (1+) mitral regurgitation.     Tricuspid Valve  There is trace tricuspid regurgitation. The right ventricular systolic  pressure is approximated at 27.7 mmHg plus the right atrial pressure.     Aortic Valve  The aortic valve is trileaflet. No aortic regurgitation is present. No aortic  stenosis is present.     Pulmonic Valve  There is no pulmonic valvular stenosis.     Vessels  Borderline dilated aortic root measuring 3.9 cm.  Normal size ascending aorta.  IVC diameter <2.1 cm collapsing >50% with sniff suggests a normal RA pressure  of 3 mmHg.     Pericardium  There is no pericardial effusion.     ______________________________________________________________________________  MMode/2D Measurements & Calculations  IVSd: 0.85 cm  LVIDd: 7.7 cm  LVIDs: 6.6 cm  LVPWd: 0.76 cm     FS: 14.2 %  LV mass(C)d: 289.0 grams  LV mass(C)dI: 139.6 grams/m2  Ao root diam: 3.9 cm  LVOT diam: 2.5 cm  LVOT area: 4.9 cm2  Ao root diam index Ht(cm/m): 2.2  Ao root diam index BSA (cm/m2): 1.9  EF Biplane: 21.5 %  LA Volume (BP): 86.5 ml  LA Volume Index (BP): 41.8 ml/m2  RWT: 0.20     TAPSE: 2.0 cm     Doppler Measurements & Calculations  LV V1 max P.7 mmHg  LV V1 max: 81.8 cm/sec  LV V1 VTI: 15.4 cm  SV(LVOT): 74.7 ml  SI(LVOT): 36.1 ml/m2  PA acc time: 0.11 sec  TR max santa: 262.9 cm/sec  TR max P.7 mmHg     ______________________________________________________________________________  Report approved by: Colin Rehman 2024 10:13 AM              Thank you for allowing me to participate in the care of your patient.      Sincerely,     Chaparro Richardson MD     [facility]  cc:   Delicia Kc PADirkC  [address]  [place] [id number]

## 2025-03-06 NOTE — PATIENT INSTRUCTIONS
March 6, 2025    Thank you for allowing our Cardiology team to participate in your care.     Please note the following changes to your heart treatment plan:     Medication changes:   - restart losartan 12.5 mg daily  - start spironolactone 12.5 mg daily     Tests to be done:  - labs in 2 weeks  - TTE in 3 months    Follow up:  - Follow up in 3 months  - Follow up at [facility] with the Advanced HF team      For scheduling, please call [phone]      Please contact our team through [employer] or our Nurse Team Voicemail service [phone] for questions or concerns.     General Clinic [phone]     If you are having a medical emergency, please call 911.     Sincerely,    Chaparro Richardson MD, FACC  Cardiology    [facility] - [facility] and [facility] - [facility] [place]

## 2025-03-06 NOTE — PROGRESS NOTES
"    Cardiology Clinic Progress Note:    March 6, 2025   Patient Name: Clement Robert  Patient MRN: [id number]     Consult indication: CHF    HPI:    I had the opportunity to see patient Clement Robert in cardiology clinic for a follow up visit. Patient is followed by our colleague Han Fuller MD with Primary Care.     As you know patient is a pleasant 59-year-old male with a past medical history significant for heart failure with reduced ejection fraction secondary to dilated nonischemic cardiomyopathy (LVEF approximately 20%), chronic left bundle branch block status post CRT-D, nonobstructive CAD, remote history of paroxysmal atrial fibrillation, FH of SCD, who presents for follow-up.    Patient has a remote history of atrial fibrillation, had been seen by my colleagues Dr. Saba and Kerry Barcenas in 2011.  At that time he had recently been diagnosed with atrial fibrillation, underwent cardioversion.  Coronary CTA 11/2011 calcium score 0, some mild soft plaque in the left circumflex artery.  Stress echocardiogram 2009 negative.  [id number] was 0, and so was on aspirin monotherapy at that time.  Started on simvastatin as well.  Since then Lexiscan stress test 3/16/2020 was negative for inducible myocardial ischemia or infarction.  He had a left bundle branch block at that time.  Family history notable for father dying from a \"heart attack\" in his 40s.    I had seen him when he was hospitalized at [facility] with heart failure symptoms 11/19/2024.  TTE demonstrated LVEF 20 to 25% with severe global hypokinesis, severe LV dilatation with LVIDD 7.7 cm.  Coronary angiogram 11/20/2024 demonstrated only mild atheromatous changes.  Patient was started on apixaban and GDMT for heart failure.  Discharged with a LifeVest due to ventricular ectopy on telemetry.      Cardiac MRI 12/19/2024 severe LV dilatation, LVEF 17%, severe global hypokinesis with paradoxical septal motion consistent " with LBBB.  RVEF 56%.  No significant valve disease.  No evidence of myocardial edema on T2 weighted imaging.  No evidence of myocardial iron overload.  Late gadolinium enhancement imaging was negative for evidence of MI, fibrosis, infiltrative disease.    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    Has been followed closely by my colleague Delicia Kc, reviewed the thorough clinic note from 2/21/2025.  Patient underwent CRT-D implantation 2/28/2025.  GDMT for heart failure has been limited by blood pressure, had been on half tablet of Entresto at level 1 dosing twice daily, however became hypotensive.  Tolerating metoprolol, Jardiance.     Overall patient reports that he feels quite well.  Completed cardiac rehab.  He is back at work, works at the [employer].  Denies any chest pain, chest pressure.  Mild dyspnea but significantly improved from hospitalization.  No abnormal lower extremity swelling, weight gain, symptoms of orthopnea/PND.    Assessment and Plan/Recommendations:    # HFrEF 2/2 dilated NICM, possibly familial, LVEF 17% on CMR 12/2024. NYHA II. Euvolemic on exam, weight 185 lb. 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   # Chronic LBBB, s/p CRT-D 2/28/2025  # Paroxysmal atrial fibrillation, on apixaban  # FH of SCD (father in his 40s)     - Reviewed cardiac history in detail  - Due to hypotension, Entresto was discontinued.  BP today in clinic 114/75 mmHg.  Will trial losartan 12.5 mg daily, and spironolactone 12.5 mg daily.  Advised to monitor for signs/symptoms of hypotension  - BMP in 2 weeks  - Continue metoprolol succinate, Jardiance   - Follow up with Device Clinic  - Referral to our colleagues with Advanced Heart Failure at [facility] to establish care, will continue to follow here as well      Thank you for allowing our team to participate in the care of Clement Robert.  Please do not hesitate to call or page me with  any questions or concerns.    Sincerely,     Chaparro Richardson MD, [facility]  Cardiology  Text Page   March 6, 2025    cc  Delicia Kc PA-C  [address]  [place] [address]    Voice recognition software utilized.     Total time spent on this encounter today: Greater than 40 minutes, providing care in this encounter including, but not limited to, reviewing prior medical records, laboratory data, imaging studies, diagnostic studies, procedure notes, formulating an assessment and plan, recommendations, discussion and counseling with patient face to face, dictation.    The longitudinal plan of care for the diagnosis(es)/condition(s) as documented were addressed during this visit. Due to the added complexity in care, I will continue to support Harshil in the subsequent management and with ongoing continuity of care.     Past Medical History:   The ASCVD Risk score (Ld DK, et al., 2019) failed to calculate for the following reasons:    The valid total cholesterol range is 130 to 320 mg/dL  Past Medical History:   Diagnosis Date    Anxiety     Non-ischemic cardiomyopathy (H) 03/06/2025    Paroxysmal A-fib (H) 03/06/2025    Seizures (H)         Past Surgical History:   Past Surgical History:   Procedure Laterality Date    CV CORONARY ANGIOGRAM N/A 11/20/2024    Procedure: Coronary Angiogram;  Surgeon: Dominick Das MD;  Location:  HEART CARDIAC CATH LAB    EP ICD INSERT BIVENT N/A 2/28/2025    Procedure: Implantable Cardioverter Defibrillator Device & Lead Implant Biventricular;  Surgeon: Talat Back MD;  Location:  HEART CARDIAC CATH LAB       Medications (outpatient):  Current Outpatient Medications   Medication Sig Dispense Refill    apixaban ANTICOAGULANT (ELIQUIS) 5 MG tablet Take 1 tablet (5 mg) by mouth 2 times daily. 180 tablet 3    busPIRone (BUSPAR) 5 MG tablet Take 10 mg by mouth at bedtime. Rx managed by Neurology      busPIRone (BUSPAR) 5 MG tablet Take 5 mg by mouth  "every morning. Rx managed by Neurology      empagliflozin (JARDIANCE) 10 MG TABS tablet Take 1 tablet (10 mg) by mouth daily. 90 tablet 1    levETIRAcetam (KEPPRA) 500 MG tablet Take 1,000 mg by mouth at bedtime. Rx managed by Neurology      levETIRAcetam (KEPPRA) 500 MG tablet Take 500 mg by mouth every morning.  6    losartan (COZAAR) 25 MG tablet Take 0.5 tablets (12.5 mg) by mouth daily. 45 tablet 3    metoprolol succinate ER (TOPROL XL) 25 MG 24 hr tablet Take 1 tablet (25 mg) by mouth daily. 90 tablet 1    nortriptyline (PAMELOR) 75 MG capsule TAKE 1 CAPSULE BY MOUTH AT BEDTIME*      rosuvastatin (CRESTOR) 20 MG tablet Take 1 tablet (20 mg) by mouth at bedtime. 90 tablet 3    spironolactone (ALDACTONE) 25 MG tablet Take 0.5 tablets (12.5 mg) by mouth daily. 45 tablet 3       Allergies:  No Known Allergies    Social History:   History   Drug Use No      History   Smoking Status    Never   Smokeless Tobacco    Never     Social History    Substance and Sexual Activity      Alcohol use: Yes        Comment: occ       Family History:  Family History   Problem Relation Age of Onset    Family History Negative Mother     Family History Negative Father        Review of Systems:   A complete review of systems was negative except as mentioned in the History of Present Illness.     Objective & Physical Exam:  /75   Pulse 90   Ht 1.778 m (5' 10\")   Wt 83.9 kg (185 lb)   SpO2 98%   BMI 26.54 kg/m    Wt Readings from Last 2 Encounters:   03/06/25 83.9 kg (185 lb)   02/28/25 82.1 kg (181 lb)     Body mass index is 26.54 kg/m .   Body surface area is 2.04 meters squared.    Constitutional: appears stated age, in no apparent distress, appears to be well nourished  Pulmonary: clear to auscultation bilaterally, no wheezes, no rales, no increased work of breathing  Cardiovascular: JVP normal, regular rate, regular rhythm, no murmur appreciated, no lower extremity edema    Data reviewed:  Lab Results   Component Value " Date    WBC 4.3 02/28/2025    WBC 8.5 11/18/2011    RBC 5.00 02/28/2025    RBC 4.90 11/18/2011    HGB 15.0 02/28/2025    HGB 15.3 11/18/2011    HCT 43.9 02/28/2025    HCT 43.2 11/18/2011    MCV 88 02/28/2025    MCV 88 11/18/2011    MCH 30.0 02/28/2025    MCH 31.2 11/18/2011    MCHC 34.2 02/28/2025    MCHC 35.4 11/18/2011    RDW 12.0 02/28/2025    RDW 12.1 11/18/2011     02/28/2025     11/18/2011     Sodium   Date Value Ref Range Status   02/28/2025 140 135 - 145 mmol/L Final   11/18/2011 137 133 - 144 mmol/L Final     Potassium   Date Value Ref Range Status   02/28/2025 4.4 3.4 - 5.3 mmol/L Final   11/18/2011 4.0 3.4 - 5.3 mmol/L Final     Chloride   Date Value Ref Range Status   02/28/2025 103 98 - 107 mmol/L Final   11/18/2011 104 94 - 109 mmol/L Final     Carbon Dioxide   Date Value Ref Range Status   11/18/2011 26 20 - 32 mmol/L Final     Carbon Dioxide (CO2)   Date Value Ref Range Status   02/28/2025 29 22 - 29 mmol/L Final     Anion Gap   Date Value Ref Range Status   02/28/2025 8 7 - 15 mmol/L Final   11/18/2011 7 6 - 17 mmol/L Final     Glucose   Date Value Ref Range Status   02/28/2025 83 70 - 99 mg/dL Final   11/18/2011 96 60 - 99 mg/dL Final     Urea Nitrogen   Date Value Ref Range Status   02/28/2025 10.0 6.0 - 20.0 mg/dL Final   11/18/2011 12 5 - 24 mg/dL Final     Creatinine   Date Value Ref Range Status   02/28/2025 1.00 0.67 - 1.17 mg/dL Final   11/18/2011 0.74 0.66 - 1.25 mg/dL Final     GFR Estimate   Date Value Ref Range Status   02/28/2025 87 >60 mL/min/1.73m2 Final     Comment:     eGFR calculated using 2021 CKD-EPI equation.   11/18/2011 >90 >60 mL/min/1.7m2 Final     Calcium   Date Value Ref Range Status   02/28/2025 9.3 8.8 - 10.4 mg/dL Final   11/18/2011 8.7 8.5 - 10.4 mg/dL Final     Bilirubin Total   Date Value Ref Range Status   02/20/2025 0.8 <=1.2 mg/dL Final     Alkaline Phosphatase   Date Value Ref Range Status   02/20/2025 97 40 - 150 U/L Final     ALT   Date Value Ref  Range Status   2025 76 (H) 0 - 70 U/L Final   2011     34 2-40f 2-60m u/l Final   2011 34 3 - 60 U/L Final     AST   Date Value Ref Range Status   2025 51 (H) 0 - 45 U/L Final   2010 41 0 - 55 U/L Final     Recent Labs   Lab Test 25  1200 24  0642   CHOL 112 219*   HDL 54 65   LDL 44 145*   TRIG 69 47      Lab Results   Component Value Date    A1C 5.1 2024        Recent Results (from the past 4320 hours)   Echocardiogram Limited   Result Value    LVEF  10-20% (severely reduced)    Narrative    [id number]  [id number]  [id number]  [id number]^ANNIE^OSWALD^NOMI     [facility]  Diagnostic and Treatment-3rd Floor  [address] [place] [address]     Name: LUNA JACOBSON  MRN: [id number]  : 1965  Study Date: 2025 09:53 AM  Age: 59 yrs  Gender: Male  Patient Location: [facility]  Reason For Study: Chronic systolic congestive heart failure (H), Non-ischemic  card  Ordering Physician: OSWALD VALENCIA  Referring Physician: OSWALD VALENCIA  Performed By: Jose Arellano     BSA: 2.0 m2  Height: 70 in  Weight: 186 lb  ______________________________________________________________________________  Procedure  Contrast Optison. Limited Echocardiogram with two-dimensional, color and  spectral Doppler. Optison (NDC #3047-7638-25) given intravenously. Patient was  given 6 ml mixture of 3 ml Optison and 6 ml saline. 3 ml wasted.  ______________________________________________________________________________  Interpretation Summary  Left ventricular function is decreased. The ejection fraction is 10-20%  (severely reduced). Severe left ventricular dilation is present. Severe  diffuse hypokinesis is present.  Global right ventricular function is mildly reduced.  Mild mitral insufficiency is present.  Mild tricuspid insufficiency is present.  No pericardial effusion is present.     This study was compared with the study  from 24, on direct comparison  LVEF is largely stable. .  ______________________________________________________________________________  Left Ventricle  Severe left ventricular dilation is present. Left ventricular function is  decreased. The ejection fraction is 10-20% (severely reduced). Severe diffuse  hypokinesis is present.     Right Ventricle  Global right ventricular function is mildly reduced.     Mitral Valve  Mild mitral insufficiency is present. The cause of the mitral insufficiency  appears to be altered left ventricular size and geometry.     Aortic Valve  The aortic valve is tricuspid.     Tricuspid Valve  Mild tricuspid insufficiency is present. The right ventricular systolic  pressure is 27mmHg above the right atrial pressure.     Pericardium  No pericardial effusion is present.     Compared to Previous Study  This study was compared with the study from 24, on direct comparison  LVEF is largely stable. .     ______________________________________________________________________________  MMode/2D Measurements & Calculations  LVIDd: 7.6 cm  LVIDs: 7.0 cm  LVPWd: 1.1 cm  FS: 8.1 %  LVOT diam: 2.3 cm  LVOT area: 4.1 cm2     EF(MOD-bp): 14.1 %  RWT: 0.28     ______________________________________________________________________________  Report approved by: Lu Juarez Dr on 2025 11:57 AM         Echocardiogram Complete   Result Value    Biplane LVEF 22%    Narrative    [id number]  [id number]  [id number]  [id number]^TRACI^GIACOMO^MAVIS     [facility]  Echocardiography Laboratory  [address] [place] [address]     Name: LUNA JACOBSON  MRN: [id number]  : 1965  Study Date: 2024 09:28 AM  Age: 59 yrs  Gender: Male  Patient Location: [facility]  Reason For Study: Heart Failure  Ordering Physician: GIACOMO AVILES  Performed By: Paola Penny     BSA: 2.1 m2  Height: 70 in  Weight: 196 lb  BP: 123/85  mmHg  ______________________________________________________________________________  Procedure  Complete Portable Echo Adult. Optison (NDC #2459-6944) given intravenously.  ______________________________________________________________________________  Interpretation Summary     Left ventricular systolic function is severely reduced.Biplane LVEF is  22%.There is severe global hypokinesia of the left ventricle.The left  ventricle is severely dilated.  The right ventricular systolic function is normal.  There is mild (1+) mitral regurgitation.  IVC diameter <2.1 cm collapsing >50% with sniff suggests a normal RA pressure  of 3 mmHg.     Stress echo dated 02/4/2009 noted normal LVEF at rest  ______________________________________________________________________________  Left Ventricle  The left ventricle is severely dilated. There is normal left ventricular wall  thickness. Left ventricular systolic function is severely reduced. Biplane  LVEF is 22%. There is severe global hypokinesia of the left ventricle.     Right Ventricle  The right ventricle is normal size. The right ventricular systolic function is  normal.     Atria  The left atrium is mild to moderately dilated. Right atrial size is normal.  There is no color Doppler evidence of an atrial shunt.     Mitral Valve  There is mild (1+) mitral regurgitation.     Tricuspid Valve  There is trace tricuspid regurgitation. The right ventricular systolic  pressure is approximated at 27.7 mmHg plus the right atrial pressure.     Aortic Valve  The aortic valve is trileaflet. No aortic regurgitation is present. No aortic  stenosis is present.     Pulmonic Valve  There is no pulmonic valvular stenosis.     Vessels  Borderline dilated aortic root measuring 3.9 cm. Normal size ascending aorta.  IVC diameter <2.1 cm collapsing >50% with sniff suggests a normal RA pressure  of 3 mmHg.     Pericardium  There is no pericardial effusion.      ______________________________________________________________________________  MMode/2D Measurements & Calculations  IVSd: 0.85 cm  LVIDd: 7.7 cm  LVIDs: 6.6 cm  LVPWd: 0.76 cm     FS: 14.2 %  LV mass(C)d: 289.0 grams  LV mass(C)dI: 139.6 grams/m2  Ao root diam: 3.9 cm  LVOT diam: 2.5 cm  LVOT area: 4.9 cm2  Ao root diam index Ht(cm/m): 2.2  Ao root diam index BSA (cm/m2): 1.9  EF Biplane: 21.5 %  LA Volume (BP): 86.5 ml  LA Volume Index (BP): 41.8 ml/m2  RWT: 0.20     TAPSE: 2.0 cm     Doppler Measurements & Calculations  LV V1 max P.7 mmHg  LV V1 max: 81.8 cm/sec  LV V1 VTI: 15.4 cm  SV(LVOT): 74.7 ml  SI(LVOT): 36.1 ml/m2  PA acc time: 0.11 sec  TR max santa: 262.9 cm/sec  TR max P.7 mmHg     ______________________________________________________________________________  Report approved by: Colin Rehman 2024 10:13 AM

## 2025-03-10 NOTE — TELEPHONE ENCOUNTER
3/7/25 - Referral from Dr. Richardson at [facility] to Dr. Kim for patient with EF 10-20%. Dr. Kim is unavailable until the end of May but will discuss with patient to see if he prefers to wait and see him or if he would like to be seen sooner by another heart failure provider.      3/10/25 - Called pt/LVM  Pt returned call.  Discussed seeing another HF provider sooner than Dr. Kim could see him.  He is agreeable.  Offered appt with Dr. Rhoades on April 18

## 2025-03-11 ENCOUNTER — MYC MEDICAL ADVICE (OUTPATIENT)
Dept: CARDIOLOGY | Facility: CLINIC | Age: 60
End: 2025-03-11
Payer: COMMERCIAL

## 2025-03-12 ENCOUNTER — ANCILLARY PROCEDURE (OUTPATIENT)
Dept: CARDIOLOGY | Facility: CLINIC | Age: 60
End: 2025-03-12
Attending: INTERNAL MEDICINE
Payer: COMMERCIAL

## 2025-03-12 DIAGNOSIS — Z95.810 ICD (IMPLANTABLE CARDIOVERTER-DEFIBRILLATOR) IN PLACE: ICD-10-CM

## 2025-03-12 LAB
MDC_IDC_EPISODE_DTM: NORMAL
MDC_IDC_EPISODE_DURATION: 1 S
MDC_IDC_EPISODE_ID: 1
MDC_IDC_EPISODE_TYPE: NORMAL
MDC_IDC_LEAD_CONNECTION_STATUS: NORMAL
MDC_IDC_LEAD_IMPLANT_DT: NORMAL
MDC_IDC_LEAD_LOCATION: NORMAL
MDC_IDC_LEAD_LOCATION_DETAIL_1: NORMAL
MDC_IDC_LEAD_MFG: NORMAL
MDC_IDC_LEAD_MODEL: NORMAL
MDC_IDC_LEAD_POLARITY_TYPE: NORMAL
MDC_IDC_LEAD_SERIAL: NORMAL
MDC_IDC_MSMT_BATTERY_DTM: NORMAL
MDC_IDC_MSMT_BATTERY_REMAINING_LONGEVITY: 128 MO
MDC_IDC_MSMT_BATTERY_RRT_TRIGGER: NORMAL
MDC_IDC_MSMT_BATTERY_VOLTAGE: 3.17 V
MDC_IDC_MSMT_CAP_CHARGE_ENERGY: 40 J
MDC_IDC_MSMT_CAP_CHARGE_TIME: 0 S
MDC_IDC_MSMT_CAP_CHARGE_TYPE: NORMAL
MDC_IDC_MSMT_LEADCHNL_LV_IMPEDANCE_VALUE: 1045 OHM
MDC_IDC_MSMT_LEADCHNL_LV_IMPEDANCE_VALUE: 418 OHM
MDC_IDC_MSMT_LEADCHNL_LV_IMPEDANCE_VALUE: 475 OHM
MDC_IDC_MSMT_LEADCHNL_LV_IMPEDANCE_VALUE: 494 OHM
MDC_IDC_MSMT_LEADCHNL_LV_IMPEDANCE_VALUE: 646 OHM
MDC_IDC_MSMT_LEADCHNL_LV_IMPEDANCE_VALUE: 836 OHM
MDC_IDC_MSMT_LEADCHNL_LV_IMPEDANCE_VALUE: 893 OHM
MDC_IDC_MSMT_LEADCHNL_LV_IMPEDANCE_VALUE: 988 OHM
MDC_IDC_MSMT_LEADCHNL_LV_PACING_THRESHOLD_AMPLITUDE: 1 V
MDC_IDC_MSMT_LEADCHNL_LV_PACING_THRESHOLD_PULSEWIDTH: 0.4 MS
MDC_IDC_MSMT_LEADCHNL_RA_IMPEDANCE_VALUE: 418 OHM
MDC_IDC_MSMT_LEADCHNL_RA_PACING_THRESHOLD_AMPLITUDE: 0.75 V
MDC_IDC_MSMT_LEADCHNL_RA_PACING_THRESHOLD_AMPLITUDE: 0.75 V
MDC_IDC_MSMT_LEADCHNL_RA_PACING_THRESHOLD_PULSEWIDTH: 0.4 MS
MDC_IDC_MSMT_LEADCHNL_RA_PACING_THRESHOLD_PULSEWIDTH: 0.4 MS
MDC_IDC_MSMT_LEADCHNL_RA_SENSING_INTR_AMPL: 2.6 MV
MDC_IDC_MSMT_LEADCHNL_RV_IMPEDANCE_VALUE: 323 OHM
MDC_IDC_MSMT_LEADCHNL_RV_IMPEDANCE_VALUE: 418 OHM
MDC_IDC_MSMT_LEADCHNL_RV_PACING_THRESHOLD_AMPLITUDE: 0.62 V
MDC_IDC_MSMT_LEADCHNL_RV_PACING_THRESHOLD_AMPLITUDE: 0.75 V
MDC_IDC_MSMT_LEADCHNL_RV_PACING_THRESHOLD_PULSEWIDTH: 0.4 MS
MDC_IDC_MSMT_LEADCHNL_RV_PACING_THRESHOLD_PULSEWIDTH: 0.4 MS
MDC_IDC_MSMT_LEADCHNL_RV_SENSING_INTR_AMPL: 19.8 MV
MDC_IDC_PG_IMPLANT_DTM: NORMAL
MDC_IDC_PG_MFG: NORMAL
MDC_IDC_PG_MODEL: NORMAL
MDC_IDC_PG_SERIAL: NORMAL
MDC_IDC_PG_TYPE: NORMAL
MDC_IDC_SESS_CLINIC_NAME: NORMAL
MDC_IDC_SESS_DTM: NORMAL
MDC_IDC_SESS_TYPE: NORMAL
MDC_IDC_SET_BRADY_AT_MODE_SWITCH_RATE: 171 {BEATS}/MIN
MDC_IDC_SET_BRADY_LOWRATE: 50 {BEATS}/MIN
MDC_IDC_SET_BRADY_MAX_SENSOR_RATE: 120 {BEATS}/MIN
MDC_IDC_SET_BRADY_MAX_TRACKING_RATE: 130 {BEATS}/MIN
MDC_IDC_SET_BRADY_MODE: NORMAL
MDC_IDC_SET_BRADY_PAV_DELAY_LOW: 140 MS
MDC_IDC_SET_BRADY_SAV_DELAY_LOW: 130 MS
MDC_IDC_SET_CRT_LVRV_DELAY: 0 MS
MDC_IDC_SET_CRT_PACED_CHAMBERS: NORMAL
MDC_IDC_SET_LEADCHNL_LV_PACING_AMPLITUDE: 2.5 V
MDC_IDC_SET_LEADCHNL_LV_PACING_ANODE_ELECTRODE_1: NORMAL
MDC_IDC_SET_LEADCHNL_LV_PACING_ANODE_LOCATION_1: NORMAL
MDC_IDC_SET_LEADCHNL_LV_PACING_CAPTURE_MODE: NORMAL
MDC_IDC_SET_LEADCHNL_LV_PACING_CATHODE_ELECTRODE_1: NORMAL
MDC_IDC_SET_LEADCHNL_LV_PACING_CATHODE_LOCATION_1: NORMAL
MDC_IDC_SET_LEADCHNL_LV_PACING_POLARITY: NORMAL
MDC_IDC_SET_LEADCHNL_LV_PACING_PULSEWIDTH: 0.4 MS
MDC_IDC_SET_LEADCHNL_RA_PACING_AMPLITUDE: 3.5 V
MDC_IDC_SET_LEADCHNL_RA_PACING_ANODE_ELECTRODE_1: NORMAL
MDC_IDC_SET_LEADCHNL_RA_PACING_ANODE_LOCATION_1: NORMAL
MDC_IDC_SET_LEADCHNL_RA_PACING_CAPTURE_MODE: NORMAL
MDC_IDC_SET_LEADCHNL_RA_PACING_CATHODE_ELECTRODE_1: NORMAL
MDC_IDC_SET_LEADCHNL_RA_PACING_CATHODE_LOCATION_1: NORMAL
MDC_IDC_SET_LEADCHNL_RA_PACING_POLARITY: NORMAL
MDC_IDC_SET_LEADCHNL_RA_PACING_PULSEWIDTH: 0.4 MS
MDC_IDC_SET_LEADCHNL_RA_SENSING_ANODE_ELECTRODE_1: NORMAL
MDC_IDC_SET_LEADCHNL_RA_SENSING_ANODE_LOCATION_1: NORMAL
MDC_IDC_SET_LEADCHNL_RA_SENSING_CATHODE_ELECTRODE_1: NORMAL
MDC_IDC_SET_LEADCHNL_RA_SENSING_CATHODE_LOCATION_1: NORMAL
MDC_IDC_SET_LEADCHNL_RA_SENSING_POLARITY: NORMAL
MDC_IDC_SET_LEADCHNL_RA_SENSING_SENSITIVITY: 0.3 MV
MDC_IDC_SET_LEADCHNL_RV_PACING_AMPLITUDE: 3.5 V
MDC_IDC_SET_LEADCHNL_RV_PACING_ANODE_ELECTRODE_1: NORMAL
MDC_IDC_SET_LEADCHNL_RV_PACING_ANODE_LOCATION_1: NORMAL
MDC_IDC_SET_LEADCHNL_RV_PACING_CAPTURE_MODE: NORMAL
MDC_IDC_SET_LEADCHNL_RV_PACING_CATHODE_ELECTRODE_1: NORMAL
MDC_IDC_SET_LEADCHNL_RV_PACING_CATHODE_LOCATION_1: NORMAL
MDC_IDC_SET_LEADCHNL_RV_PACING_POLARITY: NORMAL
MDC_IDC_SET_LEADCHNL_RV_PACING_PULSEWIDTH: 0.4 MS
MDC_IDC_SET_LEADCHNL_RV_SENSING_ANODE_ELECTRODE_1: NORMAL
MDC_IDC_SET_LEADCHNL_RV_SENSING_ANODE_LOCATION_1: NORMAL
MDC_IDC_SET_LEADCHNL_RV_SENSING_CATHODE_ELECTRODE_1: NORMAL
MDC_IDC_SET_LEADCHNL_RV_SENSING_CATHODE_LOCATION_1: NORMAL
MDC_IDC_SET_LEADCHNL_RV_SENSING_POLARITY: NORMAL
MDC_IDC_SET_LEADCHNL_RV_SENSING_SENSITIVITY: 0.3 MV
MDC_IDC_SET_ZONE_DETECTION_BEATS_DENOMINATOR: 16 {BEATS}
MDC_IDC_SET_ZONE_DETECTION_BEATS_DENOMINATOR: 32 {BEATS}
MDC_IDC_SET_ZONE_DETECTION_BEATS_DENOMINATOR: 40 {BEATS}
MDC_IDC_SET_ZONE_DETECTION_BEATS_NUMERATOR: 16 {BEATS}
MDC_IDC_SET_ZONE_DETECTION_BEATS_NUMERATOR: 30 {BEATS}
MDC_IDC_SET_ZONE_DETECTION_BEATS_NUMERATOR: 32 {BEATS}
MDC_IDC_SET_ZONE_DETECTION_INTERVAL: 300 MS
MDC_IDC_SET_ZONE_DETECTION_INTERVAL: 350 MS
MDC_IDC_SET_ZONE_DETECTION_INTERVAL: 360 MS
MDC_IDC_SET_ZONE_DETECTION_INTERVAL: 360 MS
MDC_IDC_SET_ZONE_STATUS: NORMAL
MDC_IDC_SET_ZONE_TYPE: NORMAL
MDC_IDC_SET_ZONE_VENDOR_TYPE: NORMAL
MDC_IDC_STAT_AT_BURDEN_PERCENT: 0 %
MDC_IDC_STAT_AT_DTM_END: NORMAL
MDC_IDC_STAT_AT_DTM_START: NORMAL
MDC_IDC_STAT_BRADY_AP_VP_PERCENT: 0.18 %
MDC_IDC_STAT_BRADY_AP_VS_PERCENT: 0.02 %
MDC_IDC_STAT_BRADY_AS_VP_PERCENT: 94.81 %
MDC_IDC_STAT_BRADY_AS_VS_PERCENT: 4.99 %
MDC_IDC_STAT_BRADY_DTM_END: NORMAL
MDC_IDC_STAT_BRADY_DTM_START: NORMAL
MDC_IDC_STAT_BRADY_RA_PERCENT_PACED: 0.28 %
MDC_IDC_STAT_BRADY_RV_PERCENT_PACED: 5.96 %
MDC_IDC_STAT_CRT_DTM_END: NORMAL
MDC_IDC_STAT_CRT_DTM_START: NORMAL
MDC_IDC_STAT_CRT_LV_PERCENT_PACED: 94.95 %
MDC_IDC_STAT_CRT_PERCENT_PACED: 5.92 %
MDC_IDC_STAT_EPISODE_RECENT_COUNT: 0
MDC_IDC_STAT_EPISODE_RECENT_COUNT: 1
MDC_IDC_STAT_EPISODE_RECENT_COUNT_DTM_END: NORMAL
MDC_IDC_STAT_EPISODE_RECENT_COUNT_DTM_START: NORMAL
MDC_IDC_STAT_EPISODE_TOTAL_COUNT: 0
MDC_IDC_STAT_EPISODE_TOTAL_COUNT: 1
MDC_IDC_STAT_EPISODE_TOTAL_COUNT_DTM_END: NORMAL
MDC_IDC_STAT_EPISODE_TOTAL_COUNT_DTM_START: NORMAL
MDC_IDC_STAT_EPISODE_TYPE: NORMAL
MDC_IDC_STAT_TACHYTHERAPY_ATP_DELIVERED_RECENT: 0
MDC_IDC_STAT_TACHYTHERAPY_ATP_DELIVERED_TOTAL: 0
MDC_IDC_STAT_TACHYTHERAPY_RECENT_DTM_END: NORMAL
MDC_IDC_STAT_TACHYTHERAPY_RECENT_DTM_START: NORMAL
MDC_IDC_STAT_TACHYTHERAPY_SHOCKS_ABORTED_RECENT: 0
MDC_IDC_STAT_TACHYTHERAPY_SHOCKS_ABORTED_TOTAL: 0
MDC_IDC_STAT_TACHYTHERAPY_SHOCKS_DELIVERED_RECENT: 0
MDC_IDC_STAT_TACHYTHERAPY_SHOCKS_DELIVERED_TOTAL: 0
MDC_IDC_STAT_TACHYTHERAPY_TOTAL_DTM_END: NORMAL
MDC_IDC_STAT_TACHYTHERAPY_TOTAL_DTM_START: NORMAL

## 2025-03-12 PROCEDURE — 93284 PRGRMG EVAL IMPLANTABLE DFB: CPT | Performed by: INTERNAL MEDICINE

## 2025-03-20 ENCOUNTER — LAB (OUTPATIENT)
Dept: LAB | Facility: CLINIC | Age: 60
End: 2025-03-20
Payer: COMMERCIAL

## 2025-03-20 DIAGNOSIS — I42.8 NON-ISCHEMIC CARDIOMYOPATHY (H): ICD-10-CM

## 2025-03-20 LAB
ANION GAP SERPL CALCULATED.3IONS-SCNC: 6 MMOL/L (ref 7–15)
BUN SERPL-MCNC: 12.2 MG/DL (ref 6–20)
CALCIUM SERPL-MCNC: 9.2 MG/DL (ref 8.8–10.4)
CHLORIDE SERPL-SCNC: 105 MMOL/L (ref 98–107)
CREAT SERPL-MCNC: 0.97 MG/DL (ref 0.67–1.17)
EGFRCR SERPLBLD CKD-EPI 2021: 90 ML/MIN/1.73M2
GLUCOSE SERPL-MCNC: 82 MG/DL (ref 70–99)
HCO3 SERPL-SCNC: 27 MMOL/L (ref 22–29)
POTASSIUM SERPL-SCNC: 3.8 MMOL/L (ref 3.4–5.3)
SODIUM SERPL-SCNC: 138 MMOL/L (ref 135–145)

## 2025-04-16 ENCOUNTER — MYC MEDICAL ADVICE (OUTPATIENT)
Dept: CARDIOLOGY | Facility: CLINIC | Age: 60
End: 2025-04-16
Payer: COMMERCIAL

## 2025-04-18 ENCOUNTER — OFFICE VISIT (OUTPATIENT)
Dept: CARDIOLOGY | Facility: CLINIC | Age: 60
End: 2025-04-18
Attending: INTERNAL MEDICINE
Payer: COMMERCIAL

## 2025-04-18 VITALS
SYSTOLIC BLOOD PRESSURE: 105 MMHG | BODY MASS INDEX: 26.04 KG/M2 | HEART RATE: 69 BPM | OXYGEN SATURATION: 98 % | DIASTOLIC BLOOD PRESSURE: 65 MMHG | WEIGHT: 181.5 LBS

## 2025-04-18 DIAGNOSIS — I42.8 NON-ISCHEMIC CARDIOMYOPATHY (H): ICD-10-CM

## 2025-04-18 DIAGNOSIS — I50.22 CHRONIC SYSTOLIC CONGESTIVE HEART FAILURE (H): Primary | ICD-10-CM

## 2025-04-18 PROCEDURE — 87389 HIV-1 AG W/HIV-1&-2 AB AG IA: CPT | Performed by: INTERNAL MEDICINE

## 2025-04-18 PROCEDURE — 99213 OFFICE O/P EST LOW 20 MIN: CPT | Performed by: INTERNAL MEDICINE

## 2025-04-18 PROCEDURE — 3078F DIAST BP <80 MM HG: CPT | Performed by: INTERNAL MEDICINE

## 2025-04-18 PROCEDURE — 86803 HEPATITIS C AB TEST: CPT | Performed by: INTERNAL MEDICINE

## 2025-04-18 PROCEDURE — 99000 SPECIMEN HANDLING OFFICE-LAB: CPT | Performed by: PATHOLOGY

## 2025-04-18 PROCEDURE — G2211 COMPLEX E/M VISIT ADD ON: HCPCS | Performed by: INTERNAL MEDICINE

## 2025-04-18 PROCEDURE — 99417 PROLNG OP E/M EACH 15 MIN: CPT | Mod: 24 | Performed by: INTERNAL MEDICINE

## 2025-04-18 PROCEDURE — 1126F AMNT PAIN NOTED NONE PRSNT: CPT | Performed by: INTERNAL MEDICINE

## 2025-04-18 PROCEDURE — 99215 OFFICE O/P EST HI 40 MIN: CPT | Mod: 24 | Performed by: INTERNAL MEDICINE

## 2025-04-18 PROCEDURE — 3074F SYST BP LT 130 MM HG: CPT | Performed by: INTERNAL MEDICINE

## 2025-04-18 RX ORDER — SPIRONOLACTONE 25 MG/1
25 TABLET ORAL DAILY
Qty: 45 TABLET | Refills: 3 | Status: CANCELLED | OUTPATIENT
Start: 2025-04-18

## 2025-04-18 RX ORDER — LIDOCAINE 40 MG/G
CREAM TOPICAL
OUTPATIENT
Start: 2025-04-18

## 2025-04-18 RX ORDER — SPIRONOLACTONE 25 MG/1
25 TABLET ORAL DAILY
Qty: 90 TABLET | Refills: 3 | Status: SHIPPED | OUTPATIENT
Start: 2025-04-18

## 2025-04-18 ASSESSMENT — PAIN SCALES - GENERAL: PAINLEVEL_OUTOF10: NO PAIN (0)

## 2025-04-18 NOTE — NURSING NOTE
Diet: Patient instructed regarding a heart failure healthy diet, including discussion of reduced fat and 2000 mg daily sodium restriction, daily weights, medication purpose and compliance, fluid restrictions and resources for patient and family to access for assistance with heart failure management.       Labs: Patient was given results of the laboratory testing obtained today and patient was instructed about when to return for the next laboratory testing.     Med Reconcile: Reviewed and verified all current medications with the patient. The updated medication list was printed and given to the patient. INCREASE spironolactone to 25mg daily.     Return Appointment: Patient given instructions regarding scheduling next clinic visit. RHC and CPX soon. RTC in 6 months.     Patient stated he understood all health information given and agreed to call with further questions or concerns.     Tosha Miller RN

## 2025-04-18 NOTE — LETTER
4/18/2025      RE: Clement Robert  [address] [facility] [address]       Dear Colleague,    Thank you for the opportunity to participate in the care of your patient, Clement Robert, at the [facility] [place] at [facility]. Please see a copy of my visit note below.    Cardiology Clinic Note    HPI    Dear colleagues,     I had the pleasure of seeing Mr. Clement Robert in the Cardiology clinic.  As you know, Mr. Clement Robert is a pleasant 59 year old male with a past medical history of NICM with reduced LVEF(20%), LBBB s/p CRT-D, nonobstructive CAD, remote pAF, family history of SCD who presents as a referral from Dr. Richardson for advanced therapy work-up.    Harshil has had a longstanding history of atrial fibrillation was first diagnosed in 2011 after a head injury and underwent DCCV. For his atrial fibrillation he was not on anticoagulation because his CHADS2-VASc2 was 0 but was started on ASA. He underwent ischemic work-up in the past including in 2009 a negative stress nuclear test and a CCTA with a CAC score of 0 and mild soft plaque in the left circumflex. More recently he had a Lexiscan on 3/2020 that was negative for inducible ischemia and infarction.     He developed in November of 2024 hearfailure symptoms and was admitted to [facility], TTE at the time demonstrated LVEF of 20-25% with severe global hypokinesis, severe LV dilation with LVIDD 7.7. He had a coronary angiogram then and it demonstrated only mild coronary artery disease. He also had a CMRI 1`2/19/2024 severe LV dilatation, LVEF 17%, severe global hypokinesis with paradoxical septal motion consistent with LBBB.  RVEF 56%.  No significant valve disease.  No evidence of myocardial edema on T2 weighted imaging.  No evidence of myocardial iron overload.  Late gadolinium enhancement imaging was negative for evidence of MI, fibrosis, infiltrative disease. Further more he had genetic  counseling and genetic testing done in January demonstrating only variant of unknown significance. In February 2/28/2024 he underwent CRT-D implantation.    He had started on Eliquis for his afib and GDMT since his hospitalization in Novemeber 2024. His GDMT has been limited by blood pressure and is currently on Metoprolol 25mg, Losartan 12.5mg, Jardiance 10mg and aldactone 10mg.      Since his hospitalization he reports feeling back to baseline with some bad days here and there but for the most part he feels similar to how he felt 3 years ago(his presumed baseline). He has also adjusted his lifestyle, he eats a healthy diet and exercises regularly.     Family history, his father had SCD in his 40s. Socially he is a never smoker and drinks 1-2 alcoholic beverages on weekends. 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 He lives with his wife and daughter.       CURRENT MEDICATIONS:  Current Outpatient Medications   Medication Sig Dispense Refill     apixaban ANTICOAGULANT (ELIQUIS) 5 MG tablet Take 1 tablet (5 mg) by mouth 2 times daily. 180 tablet 3     busPIRone (BUSPAR) 5 MG tablet Take 10 mg by mouth at bedtime. Rx managed by Neurology       busPIRone (BUSPAR) 5 MG tablet Take 5 mg by mouth every morning. Rx managed by Neurology       empagliflozin (JARDIANCE) 10 MG TABS tablet Take 1 tablet (10 mg) by mouth daily. 90 tablet 1     levETIRAcetam (KEPPRA) 500 MG tablet Take 1,000 mg by mouth at bedtime. Rx managed by Neurology       levETIRAcetam (KEPPRA) 500 MG tablet Take 500 mg by mouth every morning.  6     losartan (COZAAR) 25 MG tablet Take 0.5 tablets (12.5 mg) by mouth daily. 45 tablet 3     metoprolol succinate ER (TOPROL XL) 25 MG 24 hr tablet Take 1 tablet (25 mg) by mouth daily. 90 tablet 1     nortriptyline (PAMELOR) 75 MG capsule TAKE 1 CAPSULE BY MOUTH AT BEDTIME*       rosuvastatin (CRESTOR) 20 MG tablet Take 1 tablet (20 mg) by mouth at bedtime. 90 tablet 3      spironolactone (ALDACTONE) 25 MG tablet Take 0.5 tablets (12.5 mg) by mouth daily. 45 tablet 3       EXAM:  There were no vitals taken for this visit.    General: appears comfortable, alert and articulate  Heart: regular, no murmur, estimated JVP 10  Extremities: no edema    Weight  Wt Readings from Last 10 Encounters:   03/06/25 83.9 kg (185 lb)   02/28/25 82.1 kg (181 lb)   02/11/25 84.8 kg (186 lb 14.4 oz)   01/15/25 81.4 kg (179 lb 8 oz)   01/13/25 84.7 kg (186 lb 12.8 oz)   01/06/25 85.2 kg (187 lb 14.4 oz)   12/24/24 84.8 kg (187 lb)   12/03/24 87.2 kg (192 lb 4.8 oz)   11/26/24 88 kg (194 lb)   11/21/24 86.7 kg (191 lb 1.6 oz)       Testing/Procedures:  I personally visualized and interpreted:  Echocardiogram 2/21/2025:    Left ventricular function is decreased. The ejection fraction is 10-20%  (severely reduced). Severe left ventricular dilation is present. Severe  diffuse hypokinesis is present.  Global right ventricular function is mildly reduced.  Mild mitral insufficiency is present.  Mild tricuspid insufficiency is present.  No pericardial effusion is present.     This study was compared with the study from 11/19/24, on direct comparison  LVEF is largely stable. .      CMRI 12/19/2024  1. The LV is severely dilated with normal wall thickness. The global systolic function is severely reduced.  The LVEF is 17%.      There is severe global hypokinesis of the left ventricle with paradoxical septal motion consistent with  left bundle branch block.      2. The RV is normal in cavity size. The global systolic function is normal. The RVEF is 56%.      3. Normal bi-atrial size.      4. There is no significant valvular disease.      5. There is no evidence of myocardial edema on T2 weighted imaging. There is no evidence of myocardial iron  overload.      5. Late gadolinium enhancement imaging shows no MI, fibrosis or infiltrative disease.      CONCLUSIONS:      1. Severe non-ischemic dilated cardiomyopathy.      2. No late enhancement to suggest prior infarct, inflammation or infiltration.     3. Consider genetic testing for further evaluation.            Coronary angiogram 11/18/2024  Left main: Normal  LAD: Mild generalized atheromatous change involving the proximal and mid vessel.  There is very mild aneurysmal change distant with positive remodeling  Circumflex: Mild atheromatous change with no focal narrowing  Right coronary: Dominant.  Mild atheromatous change with no significant focal narrowing.    EKG 2/28/2025:          Assessment and Plan:       # HfrEF secondary to NICM (LVIdD 8.2cm)  #Chronic LBBB s/p CRT-D 2/28/2025  # NYHA I-II    Harshil has a relative recent diagnosis of NiCM that is likely familial given his father history of SCD in his 40s, however no genetic component was identified. CMRI 12/19/2024 severe LV dilatation, LVEF 17%, severe global hypokinesis with paradoxical septal motion consistent with LBBB.  RVEF 56%.  No significant valve disease.  No evidence of myocardial edema on T2 weighted imaging.  No evidence of myocardial iron overload.  Late gadolinium enhancement imaging was negative for evidence of MI, fibrosis, infiltrative disease. Further more he had genetic counseling and genetic testing done in January demonstrating only variant of unknown significance.        -SGLT-2: Jardiance 10mg daily  -ARB/ACEI/ARNI: [id number] daily  -BB: metoprolol 25mg daily  -MRA: Spironolactone 12.5mg daily ( Increase 25 mg)  -Device:  February 2/28/2024 he underwent CRT-D implantation. >95% biV pacing per march device check  -He would be a good candidate for advanced therapies. We will start with RHC and cardiopulmonary stress testing. Following this we will consider doing further work-up for Transplant and LVAD.   -We will hold off on biopsy for now as risk outweighs potential diagnostic yield at this time.  -We will await his repeat echocardiogram in June to see if there is any improvement in EF after  getting his CRT-D as well as having some GDMT on.     Paroxymal atrial fibrillation, currently in sinus rhythm and on DOAC    The patient states understanding and is agreeable with plan.   Feel free to contact myself regarding questions or concerns.  It was a pleasure to see this patient today.      Lex Meeks MD  Cardiology fellow    Chirag Rhoades MD   of Medicine  Advanced Heart Failure and Transplant Cardiology    Today's clinic visit entailed:  70 minutes spent on the date of the encounter doing chart review, history and exam, documentation and further activities per the note.  The level of medical decision making during this visit was of high complexity.  The longitudinal plan of care for the diagnosis(es)/condition(s) as documented were addressed during this visit. Due to the added complexity in care, I will continue to support Clement Robert in the subsequent management and with ongoing continuity of care.       Please do not hesitate to contact me if you have any questions/concerns.     Sincerely,     Chirag Rhoades MD

## 2025-04-18 NOTE — PATIENT INSTRUCTIONS
"Cardiology Providers you saw during your visit:  Dr. Rhoades    Medication changes: increase spironolactone to 25mg daily      Follow up: RHC and CPX. Follow up in 6 months      Please call if you have :  1. Weight gain of more than 2 pounds in a day or 5 pounds in a week  2. Increased shortness of breath, swelling or bloating  3. Dizziness, lightheadedness   4. Any questions or concerns.       Follow the American Heart Association Diet and Lifestyle recommendations:  Limit saturated fat, trans fat, sodium, red meat, sweets and sugar-sweetened beverages. If you choose to eat red meat, compare labels and select the leanest cuts available.  Aim for at least 150 minutes of moderate physical activity or 75 minutes of vigorous physical activity - or an equal combination of both - each week.      During business hours: [phone], press option # 1 to schedule or leave a message for your care team      After hours, weekends or holidays: On Call Cardiologist- [phone]   option #4 and ask to speak to the on-call Cardiologist. Inform them you are a CORE/heart failure patient at the [facility].      Please consider attending our virtual Heart Failure support group which is held monthly. Please reach out to [facility] at [phone] for more information if you are interested in attending.       Sadie ALMANZAR   Cardiology Care Coordinator - Heart Failure/ C.O.R.E. Clinic  [facility]   Questions and [phone]   First press #1 for the [facility] and then press #4 for \"To send a message to your care team\"    Pre-procedure instructions - Right Heart Cath  Patient Education    Your arrival time is TBD.  Location is [address] Waiting Room  Please plan on being at the hospital all day. If you are on dialysis, DO NOT schedule on a dialysis day.  At any time, emergencies and/or urgent cases may come up which could " delay the start of your procedure.    Pre-procedure instructions - Right heart catheterization  No solid food for 8 hours prior  Nothing to drink 2 hours prior to arrival time  You can take your morning medications (except diabetic and blood thinners) with sips of water  We recommend you arrange for a ride to drop you off and pick you up, in the instance, you are unable to drive home, however you should be able to function as you normally would after the procedure     Diabetic Medication Instructions  Hold oral diabetic medication in morning of your procedure and for 48 hours after IV contrast is given  Typical instructions for insulin diabetic medication holding are below. However, please reach out to your Primary Care Provider or Endocrinologist for specific instructions  DO NOT take any oral diabetic medication, short-acting diabetes medications/insulin, humalog or regular insulin the morning of your test  Take   dose of long-acting insulin (Lantus, Levemir) the day of your test  Remember to bring your glucometer and insulin with you to take after your test if needed  GLP-1 Agonists Instructions  DO NOT take injectable GLP-1 agonists semaglutide (Ozempic, Wegovy), dulaglutide (Trulicity), exenatide ER (Bydureon), tirzepatide (Mounjaro), or oral semaglutide (Rybelsus) for 7 days prior your procedure  Hold once daily injectable GLP-1 agonists exenatide (Byetta), liraglutide (Saxenda, Victoza), lixisenatide (Soligua) the day before and day of your procedure                Anticoagulation Medication Instructions   apixaban (ELIQUIS) - Hold 48 hours prior to procedure  Nurse to write N/A if not currently taking    You will need to follow up with one of our cardiology APPs 1-2 weeks after your procedure. If you need help scheduling or rescheduling your appointment, please call [phone]    CardioPulmonary Stress Test (CPX)  CPX is a maximal (meaning you exercise to exhaustion, not to achieve a heart rate) exercise  test where we measure how well your heart/body uses oxygen or energy. It is the gold standard for measuring functional capacity and helps us differentiate limitations due to lungs, heart, or fitness.   A CPX is NOT a typical stress test. You will NOT be asked to hold your Beta Blocker medication.     You will be scheduled for a CardioPulmonary Stress Test at the [facility] ([address], [facility] [address], [phone]).       Follow these instructions:    1. Report to the GOLD waiting room in the main hospital on: tbd    2. Nothing to eat for 3 hours prior to your test. You may have clear liquids up to the time of your test    3. Please wear loose, two-piece clothing and comfortable, rubber soled shoes for walking     For Patients with Diabetes: Your RN Coordinator will give you instructions on adjusting your diabetic medications for the day of your test                           If you have questions please contact your diabetic care team                           Remember to  bring your glucometer & insulin with you to take after your test if needed

## 2025-04-18 NOTE — PROGRESS NOTES
Cardiology Clinic Note    HPI    Dear colleagues,     I had the pleasure of seeing Mr. Clement Robert in the Cardiology clinic.  As you know, Mr. Clement Robert is a pleasant 59 year old male with a past medical history of NICM with reduced LVEF(20%), LBBB s/p CRT-D, nonobstructive CAD, remote pAF, family history of SCD who presents as a referral from Dr. Richardson for advanced therapy work-up.    Harshil has had a longstanding history of atrial fibrillation was first diagnosed in 2011 after a head injury and underwent DCCV. For his atrial fibrillation he was not on anticoagulation because his CHADS2-VASc2 was 0 but was started on ASA. He underwent ischemic work-up in the past including in 2009 a negative stress nuclear test and a CCTA with a CAC score of 0 and mild soft plaque in the left circumflex. More recently he had a Lexiscan on 3/2020 that was negative for inducible ischemia and infarction.     He developed in November of 2024 hearfailure symptoms and was admitted to [facility], TTE at the time demonstrated LVEF of 20-25% with severe global hypokinesis, severe LV dilation with LVIDD 7.7. He had a coronary angiogram then and it demonstrated only mild coronary artery disease. He also had a CMRI 1`2/19/2024 severe LV dilatation, LVEF 17%, severe global hypokinesis with paradoxical septal motion consistent with LBBB.  RVEF 56%.  No significant valve disease.  No evidence of myocardial edema on T2 weighted imaging.  No evidence of myocardial iron overload.  Late gadolinium enhancement imaging was negative for evidence of MI, fibrosis, infiltrative disease. Further more he had genetic counseling and genetic testing done in January demonstrating only variant of unknown significance. In February 2/28/2024 he underwent CRT-D implantation.    He had started on Eliquis for his afib and GDMT since his hospitalization in Novemeber 2024. His GDMT has been limited by blood pressure and is currently on Metoprolol 25mg, Losartan  12.5mg, Jardiance 10mg and aldactone 10mg.      Since his hospitalization he reports feeling back to baseline with some bad days here and there but for the most part he feels similar to how he felt 3 years ago(his presumed baseline). He has also adjusted his lifestyle, he eats a healthy diet and exercises regularly.     Family history, his father had SCD in his 40s. Socially he is a never smoker and drinks 1-2 alcoholic beverages on weekends. 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       CURRENT MEDICATIONS:  Current Outpatient Medications   Medication Sig Dispense Refill    apixaban ANTICOAGULANT (ELIQUIS) 5 MG tablet Take 1 tablet (5 mg) by mouth 2 times daily. 180 tablet 3    busPIRone (BUSPAR) 5 MG tablet Take 10 mg by mouth at bedtime. Rx managed by Neurology      busPIRone (BUSPAR) 5 MG tablet Take 5 mg by mouth every morning. Rx managed by Neurology      empagliflozin (JARDIANCE) 10 MG TABS tablet Take 1 tablet (10 mg) by mouth daily. 90 tablet 1    levETIRAcetam (KEPPRA) 500 MG tablet Take 1,000 mg by mouth at bedtime. Rx managed by Neurology      levETIRAcetam (KEPPRA) 500 MG tablet Take 500 mg by mouth every morning.  6    losartan (COZAAR) 25 MG tablet Take 0.5 tablets (12.5 mg) by mouth daily. 45 tablet 3    metoprolol succinate ER (TOPROL XL) 25 MG 24 hr tablet Take 1 tablet (25 mg) by mouth daily. 90 tablet 1    nortriptyline (PAMELOR) 75 MG capsule TAKE 1 CAPSULE BY MOUTH AT BEDTIME*      rosuvastatin (CRESTOR) 20 MG tablet Take 1 tablet (20 mg) by mouth at bedtime. 90 tablet 3    spironolactone (ALDACTONE) 25 MG tablet Take 0.5 tablets (12.5 mg) by mouth daily. 45 tablet 3       EXAM:  There were no vitals taken for this visit.    General: appears comfortable, alert and articulate  Heart: regular, no murmur, estimated JVP 10  Extremities: no edema    Weight  Wt Readings from Last 10 Encounters:   03/06/25 83.9 kg (185 lb)    02/28/25 82.1 kg (181 lb)   02/11/25 84.8 kg (186 lb 14.4 oz)   01/15/25 81.4 kg (179 lb 8 oz)   01/13/25 84.7 kg (186 lb 12.8 oz)   01/06/25 85.2 kg (187 lb 14.4 oz)   12/24/24 84.8 kg (187 lb)   12/03/24 87.2 kg (192 lb 4.8 oz)   11/26/24 88 kg (194 lb)   11/21/24 86.7 kg (191 lb 1.6 oz)       Testing/Procedures:  I personally visualized and interpreted:  Echocardiogram 2/21/2025:    Left ventricular function is decreased. The ejection fraction is 10-20%  (severely reduced). Severe left ventricular dilation is present. Severe  diffuse hypokinesis is present.  Global right ventricular function is mildly reduced.  Mild mitral insufficiency is present.  Mild tricuspid insufficiency is present.  No pericardial effusion is present.     This study was compared with the study from 11/19/24, on direct comparison  LVEF is largely stable. .      CMRI 12/19/2024  1. The LV is severely dilated with normal wall thickness. The global systolic function is severely reduced.  The LVEF is 17%.      There is severe global hypokinesis of the left ventricle with paradoxical septal motion consistent with  left bundle branch block.      2. The RV is normal in cavity size. The global systolic function is normal. The RVEF is 56%.      3. Normal bi-atrial size.      4. There is no significant valvular disease.      5. There is no evidence of myocardial edema on T2 weighted imaging. There is no evidence of myocardial iron  overload.      5. Late gadolinium enhancement imaging shows no MI, fibrosis or infiltrative disease.      CONCLUSIONS:      1. Severe non-ischemic dilated cardiomyopathy.     2. No late enhancement to suggest prior infarct, inflammation or infiltration.     3. Consider genetic testing for further evaluation.            Coronary angiogram 11/18/2024  Left main: Normal  LAD: Mild generalized atheromatous change involving the proximal and mid vessel.  There is very mild aneurysmal change distant with positive  remodeling  Circumflex: Mild atheromatous change with no focal narrowing  Right coronary: Dominant.  Mild atheromatous change with no significant focal narrowing.    EKG 2/28/2025:          Assessment and Plan:       # HfrEF secondary to NICM (LVIdD 8.2cm)  #Chronic LBBB s/p CRT-D 2/28/2025  # NYHA I-II    Harshil has a relative recent diagnosis of NiCM that is likely familial given his father history of SCD in his 40s, however no genetic component was identified. CMRI 12/19/2024 severe LV dilatation, LVEF 17%, severe global hypokinesis with paradoxical septal motion consistent with LBBB.  RVEF 56%.  No significant valve disease.  No evidence of myocardial edema on T2 weighted imaging.  No evidence of myocardial iron overload.  Late gadolinium enhancement imaging was negative for evidence of MI, fibrosis, infiltrative disease. Further more he had genetic counseling and genetic testing done in January demonstrating only variant of unknown significance.        -SGLT-2: Jardiance 10mg daily  -ARB/ACEI/ARNI: [id number] daily  -BB: metoprolol 25mg daily  -MRA: Spironolactone 12.5mg daily ( Increase 25 mg)  -Device:  February 2/28/2024 he underwent CRT-D implantation. >95% biV pacing per march device check  -He would be a good candidate for advanced therapies. We will start with RHC and cardiopulmonary stress testing. Following this we will consider doing further work-up for Transplant and LVAD.   -We will hold off on biopsy for now as risk outweighs potential diagnostic yield at this time.  -We will await his repeat echocardiogram in June to see if there is any improvement in EF after getting his CRT-D as well as having some GDMT on.     Paroxymal atrial fibrillation, currently in sinus rhythm and on DOAC    The patient states understanding and is agreeable with plan.   Feel free to contact myself regarding questions or concerns.  It was a pleasure to see this patient today.      Lex Meeks MD  Cardiology  fellow    Chirag Rhoades MD   of Medicine  Advanced Heart Failure and Transplant Cardiology    Today's clinic visit entailed:  70 minutes spent on the date of the encounter doing chart review, history and exam, documentation and further activities per the note.  The level of medical decision making during this visit was of high complexity.  The longitudinal plan of care for the diagnosis(es)/condition(s) as documented were addressed during this visit. Due to the added complexity in care, I will continue to support Clement Robert in the subsequent management and with ongoing continuity of care.

## 2025-04-24 ENCOUNTER — ANCILLARY PROCEDURE (OUTPATIENT)
Dept: CARDIOLOGY | Facility: CLINIC | Age: 60
End: 2025-04-24
Attending: INTERNAL MEDICINE
Payer: COMMERCIAL

## 2025-04-24 DIAGNOSIS — Z95.810 ICD (IMPLANTABLE CARDIOVERTER-DEFIBRILLATOR) IN PLACE: ICD-10-CM

## 2025-04-24 LAB
MDC_IDC_LEAD_CONNECTION_STATUS: NORMAL
MDC_IDC_LEAD_IMPLANT_DT: NORMAL
MDC_IDC_LEAD_LOCATION: NORMAL
MDC_IDC_LEAD_LOCATION_DETAIL_1: NORMAL
MDC_IDC_LEAD_MFG: NORMAL
MDC_IDC_LEAD_MODEL: NORMAL
MDC_IDC_LEAD_POLARITY_TYPE: NORMAL
MDC_IDC_LEAD_SERIAL: NORMAL
MDC_IDC_MSMT_BATTERY_DTM: NORMAL
MDC_IDC_MSMT_BATTERY_REMAINING_LONGEVITY: 138 MO
MDC_IDC_MSMT_BATTERY_RRT_TRIGGER: NORMAL
MDC_IDC_MSMT_BATTERY_VOLTAGE: 3.14 V
MDC_IDC_MSMT_CAP_CHARGE_DTM: NORMAL
MDC_IDC_MSMT_CAP_CHARGE_ENERGY: 18 J
MDC_IDC_MSMT_CAP_CHARGE_TIME: 3.5 S
MDC_IDC_MSMT_CAP_CHARGE_TYPE: NORMAL
MDC_IDC_MSMT_LEADCHNL_LV_IMPEDANCE_VALUE: 418 OHM
MDC_IDC_MSMT_LEADCHNL_LV_IMPEDANCE_VALUE: 418 OHM
MDC_IDC_MSMT_LEADCHNL_LV_IMPEDANCE_VALUE: 494 OHM
MDC_IDC_MSMT_LEADCHNL_LV_IMPEDANCE_VALUE: 570 OHM
MDC_IDC_MSMT_LEADCHNL_LV_IMPEDANCE_VALUE: 760 OHM
MDC_IDC_MSMT_LEADCHNL_LV_IMPEDANCE_VALUE: 779 OHM
MDC_IDC_MSMT_LEADCHNL_LV_IMPEDANCE_VALUE: 836 OHM
MDC_IDC_MSMT_LEADCHNL_LV_IMPEDANCE_VALUE: 836 OHM
MDC_IDC_MSMT_LEADCHNL_LV_IMPEDANCE_VALUE: 931 OHM
MDC_IDC_MSMT_LEADCHNL_LV_IMPEDANCE_VALUE: 931 OHM
MDC_IDC_MSMT_LEADCHNL_LV_PACING_THRESHOLD_AMPLITUDE: 0.88 V
MDC_IDC_MSMT_LEADCHNL_LV_PACING_THRESHOLD_PULSEWIDTH: 0.4 MS
MDC_IDC_MSMT_LEADCHNL_RA_IMPEDANCE_VALUE: 437 OHM
MDC_IDC_MSMT_LEADCHNL_RA_PACING_THRESHOLD_AMPLITUDE: 0.5 V
MDC_IDC_MSMT_LEADCHNL_RA_PACING_THRESHOLD_AMPLITUDE: 0.5 V
MDC_IDC_MSMT_LEADCHNL_RA_PACING_THRESHOLD_PULSEWIDTH: 0.4 MS
MDC_IDC_MSMT_LEADCHNL_RA_PACING_THRESHOLD_PULSEWIDTH: 0.4 MS
MDC_IDC_MSMT_LEADCHNL_RA_SENSING_INTR_AMPL: 3.9 MV
MDC_IDC_MSMT_LEADCHNL_RV_IMPEDANCE_VALUE: 323 OHM
MDC_IDC_MSMT_LEADCHNL_RV_IMPEDANCE_VALUE: 399 OHM
MDC_IDC_MSMT_LEADCHNL_RV_PACING_THRESHOLD_AMPLITUDE: 0.62 V
MDC_IDC_MSMT_LEADCHNL_RV_PACING_THRESHOLD_AMPLITUDE: 0.75 V
MDC_IDC_MSMT_LEADCHNL_RV_PACING_THRESHOLD_PULSEWIDTH: 0.4 MS
MDC_IDC_MSMT_LEADCHNL_RV_PACING_THRESHOLD_PULSEWIDTH: 0.4 MS
MDC_IDC_MSMT_LEADCHNL_RV_SENSING_INTR_AMPL: 17.6 MV
MDC_IDC_PG_IMPLANT_DTM: NORMAL
MDC_IDC_PG_MFG: NORMAL
MDC_IDC_PG_MODEL: NORMAL
MDC_IDC_PG_SERIAL: NORMAL
MDC_IDC_PG_TYPE: NORMAL
MDC_IDC_SESS_CLINIC_NAME: NORMAL
MDC_IDC_SESS_DTM: NORMAL
MDC_IDC_SESS_TYPE: NORMAL
MDC_IDC_SET_BRADY_AT_MODE_SWITCH_RATE: 171 {BEATS}/MIN
MDC_IDC_SET_BRADY_LOWRATE: 50 {BEATS}/MIN
MDC_IDC_SET_BRADY_MAX_SENSOR_RATE: 120 {BEATS}/MIN
MDC_IDC_SET_BRADY_MAX_TRACKING_RATE: 130 {BEATS}/MIN
MDC_IDC_SET_BRADY_MODE: NORMAL
MDC_IDC_SET_BRADY_PAV_DELAY_LOW: 140 MS
MDC_IDC_SET_BRADY_SAV_DELAY_LOW: 130 MS
MDC_IDC_SET_CRT_LVRV_DELAY: 0 MS
MDC_IDC_SET_CRT_PACED_CHAMBERS: NORMAL
MDC_IDC_SET_LEADCHNL_LV_PACING_AMPLITUDE: 1.5 V
MDC_IDC_SET_LEADCHNL_LV_PACING_ANODE_ELECTRODE_1: NORMAL
MDC_IDC_SET_LEADCHNL_LV_PACING_ANODE_LOCATION_1: NORMAL
MDC_IDC_SET_LEADCHNL_LV_PACING_CAPTURE_MODE: NORMAL
MDC_IDC_SET_LEADCHNL_LV_PACING_CATHODE_ELECTRODE_1: NORMAL
MDC_IDC_SET_LEADCHNL_LV_PACING_CATHODE_LOCATION_1: NORMAL
MDC_IDC_SET_LEADCHNL_LV_PACING_POLARITY: NORMAL
MDC_IDC_SET_LEADCHNL_LV_PACING_PULSEWIDTH: 0.4 MS
MDC_IDC_SET_LEADCHNL_RA_PACING_AMPLITUDE: 3.5 V
MDC_IDC_SET_LEADCHNL_RA_PACING_ANODE_ELECTRODE_1: NORMAL
MDC_IDC_SET_LEADCHNL_RA_PACING_ANODE_LOCATION_1: NORMAL
MDC_IDC_SET_LEADCHNL_RA_PACING_CAPTURE_MODE: NORMAL
MDC_IDC_SET_LEADCHNL_RA_PACING_CATHODE_ELECTRODE_1: NORMAL
MDC_IDC_SET_LEADCHNL_RA_PACING_CATHODE_LOCATION_1: NORMAL
MDC_IDC_SET_LEADCHNL_RA_PACING_POLARITY: NORMAL
MDC_IDC_SET_LEADCHNL_RA_PACING_PULSEWIDTH: 0.4 MS
MDC_IDC_SET_LEADCHNL_RA_SENSING_ANODE_ELECTRODE_1: NORMAL
MDC_IDC_SET_LEADCHNL_RA_SENSING_ANODE_LOCATION_1: NORMAL
MDC_IDC_SET_LEADCHNL_RA_SENSING_CATHODE_ELECTRODE_1: NORMAL
MDC_IDC_SET_LEADCHNL_RA_SENSING_CATHODE_LOCATION_1: NORMAL
MDC_IDC_SET_LEADCHNL_RA_SENSING_POLARITY: NORMAL
MDC_IDC_SET_LEADCHNL_RA_SENSING_SENSITIVITY: 0.3 MV
MDC_IDC_SET_LEADCHNL_RV_PACING_AMPLITUDE: 3.5 V
MDC_IDC_SET_LEADCHNL_RV_PACING_ANODE_ELECTRODE_1: NORMAL
MDC_IDC_SET_LEADCHNL_RV_PACING_ANODE_LOCATION_1: NORMAL
MDC_IDC_SET_LEADCHNL_RV_PACING_CAPTURE_MODE: NORMAL
MDC_IDC_SET_LEADCHNL_RV_PACING_CATHODE_ELECTRODE_1: NORMAL
MDC_IDC_SET_LEADCHNL_RV_PACING_CATHODE_LOCATION_1: NORMAL
MDC_IDC_SET_LEADCHNL_RV_PACING_POLARITY: NORMAL
MDC_IDC_SET_LEADCHNL_RV_PACING_PULSEWIDTH: 0.4 MS
MDC_IDC_SET_LEADCHNL_RV_SENSING_ANODE_ELECTRODE_1: NORMAL
MDC_IDC_SET_LEADCHNL_RV_SENSING_ANODE_LOCATION_1: NORMAL
MDC_IDC_SET_LEADCHNL_RV_SENSING_CATHODE_ELECTRODE_1: NORMAL
MDC_IDC_SET_LEADCHNL_RV_SENSING_CATHODE_LOCATION_1: NORMAL
MDC_IDC_SET_LEADCHNL_RV_SENSING_POLARITY: NORMAL
MDC_IDC_SET_LEADCHNL_RV_SENSING_SENSITIVITY: 0.3 MV
MDC_IDC_SET_ZONE_DETECTION_BEATS_DENOMINATOR: 16 {BEATS}
MDC_IDC_SET_ZONE_DETECTION_BEATS_DENOMINATOR: 32 {BEATS}
MDC_IDC_SET_ZONE_DETECTION_BEATS_DENOMINATOR: 40 {BEATS}
MDC_IDC_SET_ZONE_DETECTION_BEATS_NUMERATOR: 16 {BEATS}
MDC_IDC_SET_ZONE_DETECTION_BEATS_NUMERATOR: 30 {BEATS}
MDC_IDC_SET_ZONE_DETECTION_BEATS_NUMERATOR: 32 {BEATS}
MDC_IDC_SET_ZONE_DETECTION_INTERVAL: 300 MS
MDC_IDC_SET_ZONE_DETECTION_INTERVAL: 350 MS
MDC_IDC_SET_ZONE_DETECTION_INTERVAL: 360 MS
MDC_IDC_SET_ZONE_DETECTION_INTERVAL: 360 MS
MDC_IDC_SET_ZONE_STATUS: NORMAL
MDC_IDC_SET_ZONE_TYPE: NORMAL
MDC_IDC_SET_ZONE_VENDOR_TYPE: NORMAL
MDC_IDC_STAT_AT_BURDEN_PERCENT: 0 %
MDC_IDC_STAT_AT_DTM_END: NORMAL
MDC_IDC_STAT_AT_DTM_START: NORMAL
MDC_IDC_STAT_BRADY_AP_VP_PERCENT: 0.51 %
MDC_IDC_STAT_BRADY_AP_VS_PERCENT: 0.03 %
MDC_IDC_STAT_BRADY_AS_VP_PERCENT: 95 %
MDC_IDC_STAT_BRADY_AS_VS_PERCENT: 4.46 %
MDC_IDC_STAT_BRADY_DTM_END: NORMAL
MDC_IDC_STAT_BRADY_DTM_START: NORMAL
MDC_IDC_STAT_BRADY_RA_PERCENT_PACED: 0.74 %
MDC_IDC_STAT_BRADY_RV_PERCENT_PACED: 5.12 %
MDC_IDC_STAT_CRT_DTM_END: NORMAL
MDC_IDC_STAT_CRT_DTM_START: NORMAL
MDC_IDC_STAT_CRT_LV_PERCENT_PACED: 95.47 %
MDC_IDC_STAT_CRT_PERCENT_PACED: 5.08 %
MDC_IDC_STAT_EPISODE_RECENT_COUNT: 0
MDC_IDC_STAT_EPISODE_RECENT_COUNT_DTM_END: NORMAL
MDC_IDC_STAT_EPISODE_RECENT_COUNT_DTM_START: NORMAL
MDC_IDC_STAT_EPISODE_TOTAL_COUNT: 0
MDC_IDC_STAT_EPISODE_TOTAL_COUNT: 1
MDC_IDC_STAT_EPISODE_TOTAL_COUNT_DTM_END: NORMAL
MDC_IDC_STAT_EPISODE_TOTAL_COUNT_DTM_START: NORMAL
MDC_IDC_STAT_EPISODE_TYPE: NORMAL
MDC_IDC_STAT_TACHYTHERAPY_ATP_DELIVERED_RECENT: 0
MDC_IDC_STAT_TACHYTHERAPY_ATP_DELIVERED_TOTAL: 0
MDC_IDC_STAT_TACHYTHERAPY_RECENT_DTM_END: NORMAL
MDC_IDC_STAT_TACHYTHERAPY_RECENT_DTM_START: NORMAL
MDC_IDC_STAT_TACHYTHERAPY_SHOCKS_ABORTED_RECENT: 0
MDC_IDC_STAT_TACHYTHERAPY_SHOCKS_ABORTED_TOTAL: 0
MDC_IDC_STAT_TACHYTHERAPY_SHOCKS_DELIVERED_RECENT: 0
MDC_IDC_STAT_TACHYTHERAPY_SHOCKS_DELIVERED_TOTAL: 0
MDC_IDC_STAT_TACHYTHERAPY_TOTAL_DTM_END: NORMAL
MDC_IDC_STAT_TACHYTHERAPY_TOTAL_DTM_START: NORMAL

## 2025-04-24 PROCEDURE — 93284 PRGRMG EVAL IMPLANTABLE DFB: CPT | Performed by: INTERNAL MEDICINE

## 2025-05-13 ENCOUNTER — TELEPHONE (OUTPATIENT)
Dept: CARDIOLOGY | Facility: CLINIC | Age: 60
End: 2025-05-13
Payer: COMMERCIAL

## 2025-05-13 NOTE — TELEPHONE ENCOUNTER
----- Message from Sadie ALVAREZ sent at 4/21/2025  7:47 AM CDT -----  5/20 CPX/RHC, Follow-up with Stephany in 6 months. Pt on [place]

## 2025-05-13 NOTE — TELEPHONE ENCOUNTER
Called pt and left VM with detailed message with pre-procedure instructions. Also sent pt Post-it message.     Pre-procedure instructions - Right Heart Cath and/or Biopsy and/or Pulmonary Angiogram  Patient Education    Your arrival time is 8am on Tuesday 5/20/25 .  Location is [id number] [place] [address] Waiting Room  Please plan on being at the hospital all day. If you are on dialysis, DO NOT schedule on a dialysis day.  At any time, emergencies and/or urgent cases may come up which could delay the start of your procedure.    Pre-procedure instructions - Right heart catheterization  No solid food for 8 hours prior  Nothing to drink 2 hours prior to arrival time  You can take your morning medications (except diabetic and blood thinners) with sips of water  We recommend you arrange for a ride to drop you off and pick you up, in the instance, you are unable to drive home, however you should be able to function as you normally would after the procedure     Diabetic Medication Instructions  Hold oral diabetic medication in morning of your procedure and for 48 hours after IV contrast is given  Typical instructions for insulin diabetic medication holding are below. However, please reach out to your Primary Care Provider or Endocrinologist for specific instructions  DO NOT take any oral diabetic medication, short-acting diabetes medications/insulin, humalog or regular insulin the morning of your test  Take   dose of long-acting insulin (Lantus, Levemir) the day of your test  Remember to bring your glucometer and insulin with you to take after your test if needed  GLP-1 Agonists Instructions  DO NOT take injectable GLP-1 agonists semaglutide (Ozempic, Wegovy), dulaglutide (Trulicity), exenatide ER (Bydureon), tirzepatide (Mounjaro), or oral semaglutide (Rybelsus) for 7 days prior your procedure  Hold once daily injectable GLP-1 agonists exenatide  (Byetta), liraglutide (Saxenda, Victoza), lixisenatide (Soligua) the day before and day of your procedure                Anticoagulation Medication Instructions   apixaban (ELIQUIS) - Hold 48 hours prior to procedure-Last dose Saturday 5/17   Nurse to write N/A if not currently taking    You will need to follow up with one of our cardiology APPs 1-2 weeks after your procedure. If you need help scheduling or rescheduling your appointment, please call [phone]    Sadie Ivory RN

## 2025-05-14 NOTE — TELEPHONE ENCOUNTER
Called pt and left 2nd  with pre-procedure instructions for CPX/RHC on 5/20. Also sent pt [employer] message yesterday, has not yet been read.     Sadie Ivory RN

## 2025-05-20 ENCOUNTER — HOSPITAL ENCOUNTER (OUTPATIENT)
Facility: CLINIC | Age: 60
Discharge: HOME | End: 2025-05-20
Attending: INTERNAL MEDICINE | Admitting: INTERNAL MEDICINE
Payer: COMMERCIAL

## 2025-05-20 ENCOUNTER — HOSPITAL ENCOUNTER (OUTPATIENT)
Dept: CARDIOLOGY | Facility: CLINIC | Age: 60
Discharge: HOME | End: 2025-05-20
Attending: INTERNAL MEDICINE | Admitting: INTERNAL MEDICINE
Payer: COMMERCIAL

## 2025-05-20 VITALS
RESPIRATION RATE: 15 BRPM | TEMPERATURE: 97.8 F | OXYGEN SATURATION: 99 % | SYSTOLIC BLOOD PRESSURE: 87 MMHG | HEART RATE: 85 BPM | DIASTOLIC BLOOD PRESSURE: 76 MMHG

## 2025-05-20 VITALS — HEIGHT: 70 IN | BODY MASS INDEX: 25.31 KG/M2 | WEIGHT: 176.81 LBS

## 2025-05-20 DIAGNOSIS — I50.22 CHRONIC SYSTOLIC CONGESTIVE HEART FAILURE (H): ICD-10-CM

## 2025-05-20 LAB
ALBUMIN SERPL BCG-MCNC: 4.3 G/DL (ref 3.5–5.2)
ALP SERPL-CCNC: 98 U/L (ref 40–150)
ALT SERPL W P-5'-P-CCNC: 110 U/L (ref 0–70)
ANION GAP SERPL CALCULATED.3IONS-SCNC: 11 MMOL/L (ref 7–15)
AST SERPL W P-5'-P-CCNC: 63 U/L (ref 0–45)
BASOPHILS # BLD AUTO: 0.1 10E3/UL (ref 0–0.2)
BASOPHILS NFR BLD AUTO: 1 %
BILIRUB SERPL-MCNC: 0.8 MG/DL
BUN SERPL-MCNC: 13.3 MG/DL (ref 6–20)
CALCIUM SERPL-MCNC: 9.3 MG/DL (ref 8.8–10.4)
CHLORIDE SERPL-SCNC: 103 MMOL/L (ref 98–107)
CREAT SERPL-MCNC: 0.89 MG/DL (ref 0.67–1.17)
EGFRCR SERPLBLD CKD-EPI 2021: >90 ML/MIN/1.73M2
EOSINOPHIL # BLD AUTO: 0.1 10E3/UL (ref 0–0.7)
EOSINOPHIL NFR BLD AUTO: 3 %
ERYTHROCYTE [DISTWIDTH] IN BLOOD BY AUTOMATED COUNT: 11.9 % (ref 10–15)
GLUCOSE SERPL-MCNC: 92 MG/DL (ref 70–99)
HCO3 SERPL-SCNC: 22 MMOL/L (ref 22–29)
HCT VFR BLD AUTO: 46.5 % (ref 40–53)
HGB BLD-MCNC: 15.6 G/DL (ref 13.3–17.7)
HGB BLD-MCNC: 15.8 G/DL (ref 13.3–17.7)
IMM GRANULOCYTES # BLD: 0 10E3/UL
IMM GRANULOCYTES NFR BLD: 0 %
LYMPHOCYTES # BLD AUTO: 1.6 10E3/UL (ref 0.8–5.3)
LYMPHOCYTES NFR BLD AUTO: 32 %
MCH RBC QN AUTO: 29.5 PG (ref 26.5–33)
MCHC RBC AUTO-ENTMCNC: 34 G/DL (ref 31.5–36.5)
MCV RBC AUTO: 87 FL (ref 78–100)
MONOCYTES # BLD AUTO: 0.4 10E3/UL (ref 0–1.3)
MONOCYTES NFR BLD AUTO: 8 %
NEUTROPHILS # BLD AUTO: 2.8 10E3/UL (ref 1.6–8.3)
NEUTROPHILS NFR BLD AUTO: 56 %
NRBC # BLD AUTO: 0 10E3/UL
NRBC BLD AUTO-RTO: 0 /100
OXYHGB MFR BLDV: 65 % (ref 70–75)
PLATELET # BLD AUTO: 178 10E3/UL (ref 150–450)
POTASSIUM SERPL-SCNC: 4.4 MMOL/L (ref 3.4–5.3)
PROT SERPL-MCNC: 6.5 G/DL (ref 6.4–8.3)
RBC # BLD AUTO: 5.35 10E6/UL (ref 4.4–5.9)
SODIUM SERPL-SCNC: 136 MMOL/L (ref 135–145)
WBC # BLD AUTO: 5 10E3/UL (ref 4–11)

## 2025-05-20 PROCEDURE — 93451 RIGHT HEART CATH: CPT | Performed by: INTERNAL MEDICINE

## 2025-05-20 PROCEDURE — 82810 BLOOD GASES O2 SAT ONLY: CPT

## 2025-05-20 PROCEDURE — 272N000001 HC OR GENERAL SUPPLY STERILE: Performed by: INTERNAL MEDICINE

## 2025-05-20 PROCEDURE — 85018 HEMOGLOBIN: CPT

## 2025-05-20 PROCEDURE — C1751 CATH, INF, PER/CENT/MIDLINE: HCPCS | Performed by: INTERNAL MEDICINE

## 2025-05-20 PROCEDURE — 36415 COLL VENOUS BLD VENIPUNCTURE: CPT | Performed by: INTERNAL MEDICINE

## 2025-05-20 PROCEDURE — 82247 BILIRUBIN TOTAL: CPT | Performed by: INTERNAL MEDICINE

## 2025-05-20 PROCEDURE — 85004 AUTOMATED DIFF WBC COUNT: CPT | Performed by: INTERNAL MEDICINE

## 2025-05-20 PROCEDURE — 94621 CARDIOPULM EXERCISE TESTING: CPT

## 2025-05-20 PROCEDURE — 93451 RIGHT HEART CATH: CPT | Mod: 26 | Performed by: INTERNAL MEDICINE

## 2025-05-20 PROCEDURE — 250N000009 HC RX 250: Performed by: INTERNAL MEDICINE

## 2025-05-20 RX ORDER — LIDOCAINE 40 MG/G
CREAM TOPICAL
Status: COMPLETED | OUTPATIENT
Start: 2025-05-20 | End: 2025-05-20

## 2025-05-20 RX ADMIN — LIDOCAINE: 40 CREAM TOPICAL at 10:39

## 2025-05-20 ASSESSMENT — ACTIVITIES OF DAILY LIVING (ADL)
ADLS_ACUITY_SCORE: 52

## 2025-05-20 NOTE — PROGRESS NOTES
Pt on 2A prep for RHC. Vitally stable. Denies pain. Last took apixiban on 5/17 as directed. Labs resulted. Awaiting consent.

## 2025-05-20 NOTE — Clinical Note
Report is called to 2A Lydia ALMANZAR. The procedure and outcome is reviewed. Pt removed to 2A per AMB in stable condition.

## 2025-05-20 NOTE — DISCHARGE INSTRUCTIONS
[facility]                        Interventional Cardiology  Discharge Instructions   Post Right Heart Cath and/or Heart Biopsy      AFTER YOU GO HOME:  DO drink plenty of fluids  DO resume your regular diet and medications unless otherwise instructed by your Primary Physician  Do Not scrub the procedure site vigorously  No lotion or powder to the puncture site for 3 days    ACTIVITY:  Take it easy for the rest of the day. Do not do strenuous exercise and do not lift, pull, or push anything heavy. This lets the catheter site heal.     CARE OF THE CATHETER SITE:    For at least 24 hours, keep the bandage over the spot where the catheter was inserted.   Put ice or a cold pack on the area for 10 to 20 minutes at a time to help with soreness or swelling.   You may shower 24 hours after the procedure. Pat the incision dry.  Don't take a bath for 48 hours    WHEN SHOULD YOU CALL FOR HELP:  Monitor neck site for bleeding, swelling, or voice changes. If you notice bleeding or swelling immediately apply pressure to the site for 15 minutes, and call number below to speak with Cardiology Fellow.  If you experience any changes in your breathing you should call your doctor immediately or come to the closest Emergency Department.  Do not drive yourself.  If you have a fast-growing, painful lump at the catheter site.  If you have symptoms of infection, such as:  Increased pain, swelling, warmth, or redness.  Red streaks leading from the area.  Pus draining from the area.  A fever.    ADDITIONAL INSTRUCTIONS: Medications: You are to resume all home medications including anticoagulation therapy unless otherwise advised by your primary cardiologist or nurse coordinator.    Follow Up: Per your primary cardiology team    If you have any questions or concerns regarding your procedure site please call [phone] at any time & press option 4 to speak to the .  Ask for the Cardiology Fellow on call.  They are  available 24 hours a day.  You may also contact the Cardiology Clinic after hours number at [phone].                                                       Telephone Numbers [phone] Monday-Friday 8:00 am to 4:30 pm    [phone] After 4:30 pm Monday-Friday, Weekends & Holidays  Ask for Interventional Cardiologist on call. Someone is on call 24 hours/day   [facility] toll free number [phone] Monday-Friday 8:00 am to 4:30 pm   [facility] Emergency Dept [phone]

## 2025-05-20 NOTE — PROGRESS NOTES
Pt s/p RHC. Vitally stable. Denies pain. RIJ site covered with primapore that is C/D/I. Discharge instructions reviewed with patient.

## 2025-05-20 NOTE — PROGRESS NOTES
Consenting/Education for Cardiology Procedure: Right heart catheterization     Patient understands we would like to perform the listed procedure(s) due to advanced HF work up.    The patient understands the following:     The procedure was described to the patient in detail.    No sedation is planned for this procedure. Patient understands risks and complications of the procedure which include but are not limited to bruising/swelling around the incision site, infection, bleeding, allergic reaction to local anesthetic, air embolism, arterial puncture, stroke, heart attack, need for emergency heart surgery, death.       Patient verbalized understanding of risks and benefits and has elected to proceed with the procedure or procedures listed above.    Last dose Eliquis 5/17    Jazmine Jovel, [facility]  Cardiology

## 2025-05-20 NOTE — Clinical Note
dry, intact, no bleeding and no hematoma. The right internal jugular vein 7 FR sheath is removed to a manual hold and to a Primapore DSG.

## 2025-05-21 ENCOUNTER — TELEPHONE (OUTPATIENT)
Dept: CARDIOLOGY | Facility: CLINIC | Age: 60
End: 2025-05-21
Payer: COMMERCIAL

## 2025-05-21 LAB
CARDIOPULMONARY BLOOD PRESSURE REST: NORMAL MMHG
CARDIOPULMONARY BREATHING RESERVE REST: 92.8
CARDIOPULMONARY BREATHING RESERVE V02MAX: 47
CARDIOPULMONARY CO2 OUTPUT REST: 8.7 ML/MIN
CARDIOPULMONARY CO2 OUTPUT VO2MAX: 65.1 ML/MIN
CARDIOPULMONARY FEV 1.0 (L) ACTUAL: 3.47
CARDIOPULMONARY FEV 1.0 (L) PRECENT: 94 %
CARDIOPULMONARY FEV 1.0 (L) PREDICTED: 3.68
CARDIOPULMONARY FEV 1.0 FVC (%) ACTUAL: 81.9
CARDIOPULMONARY FEV 1.0 FVC (%) PERCENT: 108 %
CARDIOPULMONARY FEV 1.0 FVC (%) PREDICTED: 76.1
CARDIOPULMONARY FUNCTIONAL CAPACITY MAX ML/KG/MIN: 23.69 ML/KG/MIN
CARDIOPULMONARY FUNCTIONAL CAPACITY PERCENT: 75 %
CARDIOPULMONARY FUNCTIONAL CAPACITY PREDICTED: 31.6 ML/KG/MIN
CARDIOPULMONARY FVC (L) ACTUAL: 4.24
CARDIOPULMONARY FVC (L) PERCENT: 88 %
CARDIOPULMONARY FVC (L) PREDICTED: 4.83
CARDIOPULMONARY HEART RATE REST: 79 BPM
CARDIOPULMONARY MET'S REST: 0.9
CARDIOPULMONARY MINUTE VENTILATION REST: 8.7 L/MIN
CARDIOPULMONARY MINUTE VENTILATION VO2MAX: 65.1 L/MIN
CARDIOPULMONARY MYOCARDIAC O2 DEMAND MAX: NORMAL
CARDIOPULMONARY OXYGEN CONSUMPTION REST: 3.3 ML/KG/MIN
CARDIOPULMONARY OXYGEN CONSUMPTION VO2MAX: 23.69 ML/KG/MIN
CARDIOPULMONARY OXYGEN PULSE REST: 3 ML/BEAT
CARDIOPULMONARY OXYGEN PULSE VO2MAX: 14 ML/BEAT
CARDIOPULMONARY OXYGEN SATURATION- OXIMETRY REST: 100 %
CARDIOPULMONARY OXYGEN SATURATION- OXIMETRY VO2MAX: 100 %
CARDIOPULMONARY PET C02 REST: 34
CARDIOPULMONARY PET C02 VO2MAX: 38
CARDIOPULMONARY PET02 REST: 105
CARDIOPULMONARY PET02 V02 MAX: 106
CARDIOPULMONARY RER: 0.99
CARDIOPULMONARY RESPIRALORY EXCHANGE RATIO VO2MAX: 0.99
CARDIOPULMONARY RESPIRALORY EXCHANGE RATIO: 0.88
CARDIOPULMONARY RESPIRATORY RATE REST: 10 BR/MIN
CARDIOPULMONARY RESPIRATORY RATE VO2MAX: 29 BR/MIN
CARDIOPULMONARY STRESS BASE 1 BP MMHG: NORMAL MMHG
CARDIOPULMONARY STRESS BASE 1 BPA: 127 BPM
CARDIOPULMONARY STRESS BASE 1 SPO2: 100 % SPO2
CARDIOPULMONARY STRESS BASE 1 TIME SEC: 0 SEC
CARDIOPULMONARY STRESS BASE 1 TIME: 1 MINS
CARDIOPULMONARY STRESS BASE 2 BP MMHG: NORMAL MMHG
CARDIOPULMONARY STRESS BASE 2 BPA: 103 BPM
CARDIOPULMONARY STRESS BASE 2 SPO2: 100 % SPO2
CARDIOPULMONARY STRESS BASE 2 TIME SEC: 0 SEC
CARDIOPULMONARY STRESS BASE 2 TIME: 3 MINS
CARDIOPULMONARY STRESS BASE 3 BP MMHG: NORMAL MMHG
CARDIOPULMONARY STRESS BASE 3 BPA: 93 BPM
CARDIOPULMONARY STRESS BASE 3 SPO2: 100 % SPO2
CARDIOPULMONARY STRESS BASE 3 TIME SEC: 0 SEC
CARDIOPULMONARY STRESS BASE 3 TIME: 5 MINS
CARDIOPULMONARY STRESS PHASE 1 BP MMHG: NORMAL MMHG
CARDIOPULMONARY STRESS PHASE 1 BPM: 79 BPM
CARDIOPULMONARY STRESS PHASE 1 SPO2: 100 % SPO2
CARDIOPULMONARY STRESS PHASE 1 TIME SEC: 0 SEC
CARDIOPULMONARY STRESS PHASE 1 TIME: 2 MINS
CARDIOPULMONARY STRESS PHASE 2 BP MMHG: NORMAL MMHG
CARDIOPULMONARY STRESS PHASE 2 BPM: 102 BPM
CARDIOPULMONARY STRESS PHASE 2 SPO2: 100 % SPO2
CARDIOPULMONARY STRESS PHASE 2 TIME SEC: 0 SEC
CARDIOPULMONARY STRESS PHASE 2 TIME: 4 MINS
CARDIOPULMONARY STRESS PHASE 3 BP MMHG: NORMAL MMHG
CARDIOPULMONARY STRESS PHASE 3 BPM: 86 BPM
CARDIOPULMONARY STRESS PHASE 3 SPO2: 100 % SPO2
CARDIOPULMONARY STRESS PHASE 3 TIME SEC: 0 SEC
CARDIOPULMONARY STRESS PHASE 3 TIME: 6 MINS
CARDIOPULMONARY STRESS PHASE 4 BP MMHG: NORMAL MMHG
CARDIOPULMONARY STRESS PHASE 4 BPM: 96 BPM
CARDIOPULMONARY STRESS PHASE 4 SPO2: 99 % SPO2
CARDIOPULMONARY STRESS PHASE 4 TIME SEC: 0 SEC
CARDIOPULMONARY STRESS PHASE 4 TIME: 8 MINS
CARDIOPULMONARY STRESS PHASE 5 BP MMHG: NORMAL MMHG
CARDIOPULMONARY STRESS PHASE 5 BPM: 103 BPM
CARDIOPULMONARY STRESS PHASE 5 SPO2: 100 % SPO2
CARDIOPULMONARY STRESS PHASE 5 TIME SEC: 0 SEC
CARDIOPULMONARY STRESS PHASE 5 TIME: 10 MINS
CARDIOPULMONARY STRESS PHASE 6 BP MMHG: NORMAL MMHG
CARDIOPULMONARY STRESS PHASE 6 BPA: 113 BPM
CARDIOPULMONARY STRESS PHASE 6 SPO2: 100 % SPO2
CARDIOPULMONARY STRESS PHASE 6 TIME SEC: 0 SEC
CARDIOPULMONARY STRESS PHASE 6 TIME: 13 MINS
CARDIOPULMONARY STRESS PHASE 7 BP MMHG: NORMAL MMHG
CARDIOPULMONARY STRESS PHASE 7 BPM: 116 BPM
CARDIOPULMONARY STRESS PHASE 7 SPO2: 100 % SPO2
CARDIOPULMONARY STRESS PHASE 7 TIME SEC: 0 SEC
CARDIOPULMONARY STRESS PHASE 7 TIME: 16 MINS
CARDIOPULMONARY STRESS PHASE 8 BP MMHG: NORMAL MMHG
CARDIOPULMONARY STRESS PHASE 8 BPA: 126 BPM
CARDIOPULMONARY STRESS PHASE 8 SPO2: 100 % SPO2
CARDIOPULMONARY STRESS PHASE 8 TIME SEC: 0 SEC
CARDIOPULMONARY STRESS PHASE 8 TIME: 19 MINS
CARDIOPULMONARY SVC (L) ACTUAL: 4.25
CARDIOPULMONARY SVC (L) PERCENT: 88 %
CARDIOPULMONARY SVC (L) PREDICTED: 4.83
CARDIOPULMONARY TIDAL VOLUME REST: 868 ML
CARDIOPULMONARY TIDAL VOLUME VO2MAX: 2244 ML
CARDIOPULMONARY VE/VCO2 SLOPE: 28.15
CARDIOPULMONARY VENTILATORY EQUIVALENT 02 REST: 33
CARDIOPULMONARY VENTILATORY EQUIVALENT 02 V02: 29
CARDIOPULMONARY VENTILATORY EQUIVALENT C02 REST: 38
CARDIOPULMONARY VENTILATORY EQUIVALENT C02 SLOPE VO2MAX: 28.15
CARDIOPULMONARY VENTILATORY EQUIVALENT C02 VO2MAX: 29
CV STRESS MAX HR HE: 135
Lab: 0 SEC
Lab: 0 SEC
Lab: 100 % SPO2
Lab: 100 % SPO2
Lab: 132 BPM
Lab: 135 BPM
Lab: 22 MINS
Lab: 25 MINS
Lab: NORMAL MMHG
PREDICTED VO2MAX: 31.6
RATED PERCEIVED EXERTION: 16
STRESS ANGINA INDEX: 0
STRESS ECHO BASELINE BP: NORMAL MMHG
STRESS ECHO BASELINE HR: 57 BPM
STRESS ECHO CALCULATED PERCENT HR: 83 %
STRESS ECHO LAST STRESS BP: NORMAL MMHG
STRESS ECHO POST ESTIMATED WORKLOAD: 6.8 METS
STRESS ECHO POST EXERCISE DUR MIN: 24 MIN
STRESS ECHO POST EXERCISE DUR SEC: 0 SEC
STRESS ECHO TARGET HR: 162

## 2025-05-21 NOTE — TELEPHONE ENCOUNTER
Patient Contacted for the patient to call back and schedule the following:    Appointment type: F  Provider: Dr. Rhoades  Return date: 11/21/25  Specialty phone number: [phone] opt 1  Additional appointment(s) needed: Lab  Additonal Notes: NA

## 2025-05-27 ENCOUNTER — MYC MEDICAL ADVICE (OUTPATIENT)
Dept: CARDIOLOGY | Facility: CLINIC | Age: 60
End: 2025-05-27
Payer: COMMERCIAL

## 2025-06-06 ENCOUNTER — HOSPITAL ENCOUNTER (OUTPATIENT)
Dept: CARDIOLOGY | Facility: CLINIC | Age: 60
Discharge: HOME | End: 2025-06-06
Attending: INTERNAL MEDICINE | Admitting: INTERNAL MEDICINE
Payer: COMMERCIAL

## 2025-06-06 ENCOUNTER — MYC REFILL (OUTPATIENT)
Dept: INTERNAL MEDICINE | Facility: CLINIC | Age: 60
End: 2025-06-06

## 2025-06-06 ENCOUNTER — RESULTS FOLLOW-UP (OUTPATIENT)
Dept: CARDIOLOGY | Facility: CLINIC | Age: 60
End: 2025-06-06

## 2025-06-06 DIAGNOSIS — I42.8 NON-ISCHEMIC CARDIOMYOPATHY (H): ICD-10-CM

## 2025-06-06 LAB — LVEF ECHO: NORMAL

## 2025-06-06 PROCEDURE — 255N000002 HC RX 255 OP 636: Performed by: INTERNAL MEDICINE

## 2025-06-06 PROCEDURE — 93306 TTE W/DOPPLER COMPLETE: CPT | Mod: 26 | Performed by: INTERNAL MEDICINE

## 2025-06-06 PROCEDURE — C8929 TTE W OR WO FOL WCON,DOPPLER: HCPCS

## 2025-06-06 RX ADMIN — HUMAN ALBUMIN MICROSPHERES AND PERFLUTREN 2 ML: 10; .22 INJECTION, SOLUTION INTRAVENOUS at 08:05

## 2025-06-09 RX ORDER — NORTRIPTYLINE HYDROCHLORIDE 75 MG/1
CAPSULE ORAL
OUTPATIENT
Start: 2025-06-09

## 2025-06-09 NOTE — TELEPHONE ENCOUNTER
Last office visit - 01/13/2025 for establish care with DR. Fuller.    1st attempt.    Sent [employer] message to patient.    Thank you,  Clement, Triage RN Romie Caputo    10:13 AM 6/9/2025

## 2025-06-09 NOTE — TELEPHONE ENCOUNTER
Clinic RN: Please investigate patient's chart or contact patient if the information cannot be found because the medication is listed as historical or discontinued. Confirm patient is taking this medication. Document findings and route refill encounter to provider for approval or denial.    Sarah DANIELS, RN, PHN

## 2025-06-10 DIAGNOSIS — I42.8 NON-ISCHEMIC CARDIOMYOPATHY (H): ICD-10-CM

## 2025-06-10 RX ORDER — METOPROLOL SUCCINATE 25 MG/1
25 TABLET, EXTENDED RELEASE ORAL DAILY
Qty: 90 TABLET | Refills: 3 | Status: SHIPPED | OUTPATIENT
Start: 2025-06-10

## 2025-06-11 ENCOUNTER — PATIENT OUTREACH (OUTPATIENT)
Dept: CARE COORDINATION | Facility: CLINIC | Age: 60
End: 2025-06-11

## 2025-06-16 ENCOUNTER — MYC MEDICAL ADVICE (OUTPATIENT)
Dept: CARDIOLOGY | Facility: CLINIC | Age: 60
End: 2025-06-16
Payer: COMMERCIAL

## 2025-06-16 DIAGNOSIS — I42.8 NON-ISCHEMIC CARDIOMYOPATHY (H): ICD-10-CM

## 2025-06-16 RX ORDER — METOPROLOL SUCCINATE 25 MG/1
25 TABLET, EXTENDED RELEASE ORAL DAILY
Qty: 90 TABLET | Refills: 3 | Status: SHIPPED | OUTPATIENT
Start: 2025-06-16

## 2025-06-18 DIAGNOSIS — I50.22 CHRONIC SYSTOLIC CONGESTIVE HEART FAILURE (H): Primary | ICD-10-CM

## 2025-06-24 ENCOUNTER — LAB (OUTPATIENT)
Dept: LAB | Facility: CLINIC | Age: 60
End: 2025-06-24
Payer: COMMERCIAL

## 2025-06-24 ENCOUNTER — OFFICE VISIT (OUTPATIENT)
Dept: CARDIOLOGY | Facility: CLINIC | Age: 60
End: 2025-06-24
Attending: INTERNAL MEDICINE
Payer: COMMERCIAL

## 2025-06-24 ENCOUNTER — TELEPHONE (OUTPATIENT)
Dept: CARDIOLOGY | Facility: CLINIC | Age: 60
End: 2025-06-24

## 2025-06-24 VITALS
DIASTOLIC BLOOD PRESSURE: 62 MMHG | SYSTOLIC BLOOD PRESSURE: 110 MMHG | WEIGHT: 176 LBS | HEART RATE: 71 BPM | BODY MASS INDEX: 25.2 KG/M2 | HEIGHT: 70 IN | OXYGEN SATURATION: 97 %

## 2025-06-24 DIAGNOSIS — I50.22 CHRONIC SYSTOLIC CONGESTIVE HEART FAILURE (H): ICD-10-CM

## 2025-06-24 DIAGNOSIS — I50.22 CHRONIC SYSTOLIC CONGESTIVE HEART FAILURE (H): Primary | ICD-10-CM

## 2025-06-24 DIAGNOSIS — I44.7 LBBB (LEFT BUNDLE BRANCH BLOCK): ICD-10-CM

## 2025-06-24 DIAGNOSIS — I42.8 NON-ISCHEMIC CARDIOMYOPATHY (H): ICD-10-CM

## 2025-06-24 DIAGNOSIS — Z95.810 ICD (IMPLANTABLE CARDIOVERTER-DEFIBRILLATOR) IN PLACE: ICD-10-CM

## 2025-06-24 LAB
ALBUMIN SERPL BCG-MCNC: 4.4 G/DL (ref 3.5–5.2)
ALP SERPL-CCNC: 93 U/L (ref 40–150)
ALT SERPL W P-5'-P-CCNC: 73 U/L (ref 0–70)
ANION GAP SERPL CALCULATED.3IONS-SCNC: 9 MMOL/L (ref 7–15)
AST SERPL W P-5'-P-CCNC: 39 U/L (ref 0–45)
BILIRUB SERPL-MCNC: 0.9 MG/DL
BUN SERPL-MCNC: 15.6 MG/DL (ref 6–20)
CALCIUM SERPL-MCNC: 9 MG/DL (ref 8.8–10.4)
CHLORIDE SERPL-SCNC: 104 MMOL/L (ref 98–107)
CREAT SERPL-MCNC: 0.87 MG/DL (ref 0.67–1.17)
EGFRCR SERPLBLD CKD-EPI 2021: >90 ML/MIN/1.73M2
GLUCOSE SERPL-MCNC: 122 MG/DL (ref 70–99)
HCO3 SERPL-SCNC: 24 MMOL/L (ref 22–29)
NT-PROBNP SERPL-MCNC: 2112 PG/ML (ref 0–177)
POTASSIUM SERPL-SCNC: 4.1 MMOL/L (ref 3.4–5.3)
PROT SERPL-MCNC: 6.4 G/DL (ref 6.4–8.3)
SODIUM SERPL-SCNC: 137 MMOL/L (ref 135–145)

## 2025-06-24 PROCEDURE — 36415 COLL VENOUS BLD VENIPUNCTURE: CPT

## 2025-06-24 PROCEDURE — 83880 ASSAY OF NATRIURETIC PEPTIDE: CPT

## 2025-06-24 PROCEDURE — 99417 PROLNG OP E/M EACH 15 MIN: CPT | Performed by: PHYSICIAN ASSISTANT

## 2025-06-24 PROCEDURE — 93000 ELECTROCARDIOGRAM COMPLETE: CPT | Performed by: PHYSICIAN ASSISTANT

## 2025-06-24 PROCEDURE — 99215 OFFICE O/P EST HI 40 MIN: CPT | Performed by: PHYSICIAN ASSISTANT

## 2025-06-24 PROCEDURE — 3078F DIAST BP <80 MM HG: CPT | Performed by: PHYSICIAN ASSISTANT

## 2025-06-24 PROCEDURE — 3074F SYST BP LT 130 MM HG: CPT | Performed by: PHYSICIAN ASSISTANT

## 2025-06-24 PROCEDURE — 80053 COMPREHEN METABOLIC PANEL: CPT

## 2025-06-24 RX ORDER — ROSUVASTATIN CALCIUM 20 MG/1
20 TABLET, COATED ORAL AT BEDTIME
Qty: 90 TABLET | Refills: 3 | Status: SHIPPED | OUTPATIENT
Start: 2025-06-24

## 2025-06-24 RX ORDER — LOSARTAN POTASSIUM 25 MG/1
12.5 TABLET ORAL DAILY
Qty: 45 TABLET | Refills: 3 | Status: CANCELLED | OUTPATIENT
Start: 2025-06-24

## 2025-06-24 RX ORDER — SACUBITRIL AND VALSARTAN 24; 26 MG/1; MG/1
TABLET, FILM COATED ORAL
Status: SHIPPED
Start: 2025-06-24

## 2025-06-24 RX ORDER — METOPROLOL SUCCINATE 25 MG/1
25 TABLET, EXTENDED RELEASE ORAL AT BEDTIME
Status: SHIPPED
Start: 2025-06-24

## 2025-06-24 RX ORDER — SPIRONOLACTONE 25 MG/1
12.5 TABLET ORAL DAILY
Status: SHIPPED
Start: 2025-06-24

## 2025-06-24 NOTE — TELEPHONE ENCOUNTER
"I will be rounding but I can be available early tomorrow morning if he is able to come in and most mornings next week.      Unfortunately we didn't have great options for CS branches so the LV lead location is probably sub-optimal.  If we can't get much improvement with programming changes, we could consider bringing him back in and placing a left bundle lead.       Above message received from Dr. Back.    Per Dr. Back, 7/2/25 works at either 0815 or 0900.  I spoke with patient and informed him of the plan. I did NOT inform him that we may need to eventually implant LBB lead. He is aware that due to needing a provider present and a longer appt slot, we are limited with the \"when and where\" that this apt can take place. He can make 7/2/25 @ 0900 work. He is hopeful that these changes will improve his ejection fraction.     8/14 holds discontinued.    "

## 2025-06-24 NOTE — PROGRESS NOTES
"[facility] [place] - [facility]     CMP ordered for today's CORE OV with Delicia Giraldo, PAC.    Order placed.     Call to pt to see if he can come at 12:15 pm for CMP and NT pro BNP.  Pt is able to come at 12:30 pm. Pt requesting to have liver enzymes and NT pro BNP rechecked as they were elevated previously. Discussed CMP and NT pro BNP will be rechecked today. Pt expressed concerns of elevated LFTs. Discussed Dr Rhoades's recommendation to hold crestor for a month if he preferred. Pt states he \"was told to follow-up with my primary. I was not told to hold the cholesterol med. They said it probably was not from my heart medications.\"   Pt is also interested in trying Entresto again.  He will discuss with Delicia at today's OV.         5/27/25: Monica encounter:  Dr Rhoades: \"I'm not sure why the AST/ALT is borderline abnormal. It had started before I met him. I doubt its related to a medication, crestor would be the only one that comes to mind. I would be alright if he wanted to hold the crestor for a month to see if it affects the labs. Otherwise we can refer him to a hepatologist.  Or can discuss with PCP other causes. I would not worry about that blood gas number, its something we do during the RHC procedure to calculate certain blood flow formulas\".       Tammy Davis, JEREMIAH, RN 10:12 AM 06/24/25    "

## 2025-06-24 NOTE — PATIENT INSTRUCTIONS
Today, we discussed the following:  Medication changes:   STOP Losartan. Tomorrow AM, start Entresto 1/2 tab twice daily.   Reduce spironolactone to 12.5 (1/2 tab) daily.  Move Toprol to be at bedtime.  Follow up:   Device RN will contact you after we discuss, if it is felt we can make some helpful changes to your device.   Non-fasting labs (CMP and proBNP) in 4 weeks.  Schedule an echo in 3 months. Return to see me after that.  Dr. Rhoades in November.     Please, remember to continue the followin.  Weigh yourself daily. Call if your weight is up > than 2 pounds in one day, or 5 pounds in one week; if you feel more short of breath or have worsening swelling in your legs or abdomen.  2.  Eat a low sodium diet (less than 2,000mg or 2g daily). If you eat less salt, you will retain less fluid.   3.  Avoid alcohol, as this can worsen heart failure.   4.  Avoid NSAIDs as able (For example, Ibuprofen / aleve / advil / naprosen / diclofenac).    Thank you for your visit with the [facility] today.   CORE stands for Cardiomyopathy Optimization Rehabilitation and Education. The CORE clinic will teach and help you to manage your heart failure and keep you out of the hospital.    Call [facility] nurse for any questions or concerns Mon-Fri 8am-4pm  [phone]: Nurse number   [phone]: After Hours Phone Number

## 2025-06-24 NOTE — LETTER
6/24/2025    Han Fuller MD  [address] [facility] [address]    RE: Clement Robert       Dear Colleague,     I had the pleasure of seeing Clement Robert in the [facility].    Cardiology C.O.R.E (heart failure specialty) Clinic Progress Note    Clement Robert MRN# [id number]   YOB: 1965 Age: 59 year old     Primary cardiology team: Dr. Richardson (general) / Dr. Rhoades ([facility])         Assessment and Plan     In summary, Clement Robert presents today for a CORE clinic follow up visit.     HFrEF, severely dilated CMP  EF: 22% (11/2024 TTE); 17% on 12/2024 cMRI; 10-20% on 2/2025 TTE; 10-15%, LVIDD 7.3 cm on 6/2025 TTE (no improvement after CRT).  RV normal size and function.  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  Volume: Appears well-compensated at 173 lbs (home scale).  RHC and CPT 5/2025 reassuring. He demonstrated class I functional capacity, had normal biventricular filling pressures and of course a reduced cardiac index but only slightly at 2.3.   Renal fn nml. LFT's had been mildly elevated, now AST nml, ALT 73 (ULN 70).  GDMT: jardiance 10, Toprol 25, Losartan 12.5.  Did not tolerate half the low dose of Entresto d/t hypotension.   Device: CRT-D (implanted 2/2025) with appropriate BiV pacing.  Last HF admission 11/2024.    Minimal, non-obstructive CAD.  LDL now under excellent control on statin therapy (44).    PAF.  Isolated episode, none documented since 2011, anticoagulated.     Plan:  We're going to re-try stopping Losartan and starting half the low dose of Entresto. He'll reduce arnulfo to 12.5 mg daily and move his Toprol to bedtime to help avoid symptomatic hypotension.  Will monitor his LFT's but these have essentially normalized without intervention. I don't think the prior elevation was indicative of his HF status.   Will have Dr. Back review Harshil's device -  "QRS very wide and negative in V1 on post-implant EKG, ?changes to LV offset which may improve effectiveness of his CRT.  Repeat CMP, proBNP in four weeks. Echo/labs/visit with me in 3 months.  It seems that he is heading down the road to transplant. Update sent to Dr. Rhoades. He is scheduled to see him again in November. Harshil knows to notify us if his functional capacity changes.  Encouraged him to seek therapy/counseling at this time. He is receptive.    It was a pleasure seeing Harshil, as always.      ANNA Horner [facility]         History of Presenting Illness     Clement Robert is a pleasant 59 year old patient with a pertinent history of the following -   PAF.  Diagnosed in 2011 after being hit by hockey puck and sent to ED. Underwent cardioversion.  EF normal at that time. No evidence of recurrence.  On Eliquis. [id number] sore is 1.  NICMP  Pt was lost to follow-up after 2011 until hospitalization in November 2024, with progressive dyspnea and intermittent palpitations, found to have a severely reduced ejection fraction of 20% with global hypokinesis and severe dilation with an LVIDD of 7.7 cm, normal RV function, mild MR. Trops normal.   Diuresed, placed on Toprol 25, Losartan 12.5, jardiance 10, no diuretics. Discharge wt 190 lbs.  Given concerns for ventricular ectopy and family history of sudden cardiac death, LifeVest was placed on discharge.  Cardiac MRI was completed 12/19/2024 that revealed severe nonischemic dilated cardiomyopathy with LVEF 17%.  There was no late enhancement to suggest prior infarct, inflammation, or infiltration.   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  Underwent CRT-D implantation 2/28/25.  LBBB, newly noted in 2020. Stress nuclear study at that time showed EF 56%, no ischemia.  Very mild CAD on 11/2024 angiogram.  +FH of SCD father dying from \"heart attack\" in his 40's.   Seizure disorder, migraines, anxiety.  No significant " alcohol use. No illicit drug use.     Harshil has done fairly well since his hospitalization, with improvement in his functional capacity, no octavia dyspnea, orthopnea, edema, following a strict sodium restricted diet, and compliance with his LifeVest which has not recorded any arrhythmias. Unfortunately his ejection fraction on echocardiogram earlier today showed a stably reduced EF of 10-20%, with severe LV dilation (LVIDD 7.6 cm), mildly reduced RV function, mild MR and TR. I referred him for CRT-D implantation then, which took place in February. After that he met with Dr. Rhoades at [facility], and underwent RHC and cardiopulmonary stress testing which were actually fairly reassuring -he demonstrated class I functional capacity, had normal biventricular filling pressures and of course a reduced cardiac index but only slightly at 2.3.  Dr. Rhoades recommended repeat echo took place earlier this month, unfortunately again showing no EF improvement at 10-15%, LVIDD 7.3 cm.  RV normal size and function, mild MR.    Today, Harshil returns to clinic stating he's started to notice subtle increases in his shortness of breath over the prior couple months, but overall has continued to be active without significant limitations, including swimming. He continues to have overall rare positional lightheadedness. He denies chest pain, orthopnea, edema. Weighing daily, following strict sodium restriction. Remains understandably anxious, with again many appropriate questions about prognosis/management.     Renal fn nml. LFT's had been mildly elevated, now AST nml, ALT 73 (ULN 70). proBNP up slightly at 2100.  Device interrogation 4/24/25 shows 95.5% biventricular paced with 3.7% V SR, 0.7% atrial paced, no arrhythmias.  Post implant EKG did show a negative QRS in lead V1, also quite wide at 180 ms. EKG today looks the same.         Review of Systems     12-pt ROS is negative except for as noted in the HPI.          Physical Exam     Vitals: BP  "110/62   Pulse 71   Ht 1.778 m (5' 10\")   Wt 79.8 kg (176 lb)   SpO2 97%   BMI 25.25 kg/m    Wt Readings from Last 10 Encounters:   06/24/25 79.8 kg (176 lb)   05/20/25 80.2 kg (176 lb 12.9 oz)   04/18/25 82.3 kg (181 lb 8 oz)   03/06/25 83.9 kg (185 lb)   02/28/25 82.1 kg (181 lb)   02/11/25 84.8 kg (186 lb 14.4 oz)   01/15/25 81.4 kg (179 lb 8 oz)   01/13/25 84.7 kg (186 lb 12.8 oz)   01/06/25 85.2 kg (187 lb 14.4 oz)   12/24/24 84.8 kg (187 lb)       Constitutional:  Patient is pleasant, alert, cooperative, and in NAD.  HEENT:  NCAT. PERRLA. EOM's intact.   Neck:  CVP appears normal. No carotid bruits.   Pulmonary: Normal respiratory effort. CTAB.   Cardiac: RRR, normal S1/S2, no S3/S4, no murmur or rub.   Abdomen:  Non-tender abdomen, no hepatosplenomegaly appreciated.   Vascular: Pulses in the upper and lower extremities are 2+ and equal bilaterally.  Extremities: No edema, erythema, cyanosis or tenderness appreciated.  Skin:  No rashes or lesions appreciated.   Neurological:  No gross motor or sensory deficits.   Psych: Appropriate affect.          Data   Labs reviewed:  Recent Labs   Lab Test 06/24/25  1232 04/18/25  1028 02/20/25  0805 02/11/25  1200 11/25/24  0830 11/19/24  0642 11/18/24  1441   LDL  --   --   --  44  --  145*  --    HDL  --   --   --  54  --  65  --    NHDL  --   --   --  58  --  154*  --    CHOL  --   --   --  112  --  219*  --    TRIG  --   --   --  69  --  47  --    TSH  --   --   --   --   --   --  4.12   NTBNP 2,112* 1,674* 1,079* 1,830*   < >  --   --     < > = values in this interval not displayed.       Lab Results   Component Value Date    WBC 5.0 05/20/2025    WBC 8.5 11/18/2011    RBC 5.35 05/20/2025    RBC 4.90 11/18/2011    HGB 15.6 05/20/2025    HGB 15.8 05/20/2025    HGB 15.3 11/18/2011    HCT 46.5 05/20/2025    HCT 43.2 11/18/2011    MCV 87 05/20/2025    MCV 88 11/18/2011    MCH 29.5 05/20/2025    MCH 31.2 11/18/2011    MCHC 34.0 05/20/2025    MCHC 35.4 11/18/2011    " RDW 11.9 05/20/2025    RDW 12.1 11/18/2011     05/20/2025     11/18/2011       Lab Results   Component Value Date     06/24/2025     11/18/2011    POTASSIUM 4.1 06/24/2025    POTASSIUM 4.0 11/18/2011    CHLORIDE 104 06/24/2025    CHLORIDE 104 11/18/2011    CO2 24 06/24/2025    CO2 26 11/18/2011    ANIONGAP 9 06/24/2025    ANIONGAP 7 11/18/2011     (H) 06/24/2025    GLC 96 11/18/2011    BUN 15.6 06/24/2025    BUN 12 11/18/2011    CR 0.87 06/24/2025    CR 0.74 11/18/2011    GFRESTIMATED >90 06/24/2025    GFRESTIMATED >90 11/18/2011    GFRESTBLACK >90 11/18/2011    JAMES 9.0 06/24/2025    JAMES 8.7 11/18/2011      Lab Results   Component Value Date    AST 39 06/24/2025    AST 41 07/29/2010    ALT 73 (H) 06/24/2025    ALT     34 11/28/2011    ALT 34 11/28/2011       Lab Results   Component Value Date    A1C 5.1 11/18/2024       Lab Results   Component Value Date    INR 0.99 11/18/2024            Problem List     Patient Active Problem List   Diagnosis     Seizures (H)     Anxiety     Epilepsy without status epilepticus, not intractable, unspecified     LBBB (left bundle branch block)     Major depressive disorder, single episode, unspecified     Polyp of colon     Chronic systolic congestive heart failure (H)     Non-ischemic cardiomyopathy (H)     Paroxysmal A-fib (H)            Medications     Current Outpatient Medications   Medication Sig Dispense Refill     apixaban ANTICOAGULANT (ELIQUIS) 5 MG tablet Take 1 tablet (5 mg) by mouth 2 times daily. 180 tablet 3     busPIRone (BUSPAR) 5 MG tablet Take 10 mg by mouth at bedtime. Rx managed by Neurology       busPIRone (BUSPAR) 5 MG tablet Take 5 mg by mouth every morning. Rx managed by Neurology       empagliflozin (JARDIANCE) 10 MG TABS tablet Take 1 tablet (10 mg) by mouth daily. 90 tablet 3     levETIRAcetam (KEPPRA) 500 MG tablet Take 1,000 mg by mouth at bedtime. Rx managed by Neurology       levETIRAcetam (KEPPRA) 500 MG tablet Take  500 mg by mouth every morning.  6     losartan (COZAAR) 25 MG tablet Take 0.5 tablets (12.5 mg) by mouth daily. 45 tablet 3     metoprolol succinate ER (TOPROL XL) 25 MG 24 hr tablet Take 1 tablet (25 mg) by mouth daily. 90 tablet 3     nortriptyline (PAMELOR) 75 MG capsule TAKE 1 CAPSULE BY MOUTH AT BEDTIME*       rosuvastatin (CRESTOR) 20 MG tablet Take 1 tablet (20 mg) by mouth at bedtime. 90 tablet 3     spironolactone (ALDACTONE) 25 MG tablet Take 1 tablet (25 mg) by mouth daily. 90 tablet 3            Past Medical History     Past Medical History:   Diagnosis Date     Anxiety      Non-ischemic cardiomyopathy (H) 03/06/2025     Paroxysmal A-fib (H) 03/06/2025     Seizures (H)      Past Surgical History:   Procedure Laterality Date     CV CORONARY ANGIOGRAM N/A 11/20/2024    Procedure: Coronary Angiogram;  Surgeon: Dominick Das MD;  Location:  HEART CARDIAC CATH LAB     CV RIGHT HEART CATH MEASUREMENTS RECORDED N/A 5/20/2025    Procedure: Heart Cath Right Heart Cath;  Surgeon: Chirag Rhoades MD;  Location:  HEART CARDIAC CATH LAB     EP ICD INSERT BIVENT N/A 2/28/2025    Procedure: Implantable Cardioverter Defibrillator Device & Lead Implant Biventricular;  Surgeon: Talat Back MD;  Location:  HEART CARDIAC CATH LAB     Family History   Problem Relation Age of Onset     Family History Negative Mother      Family History Negative Father      Social History     Socioeconomic History     Marital status:      Spouse name: Not on file     Number of children: Not on file     Years of education: Not on file     Highest education level: Not on file   Occupational History     Not on file   Tobacco Use     Smoking status: Never     Smokeless tobacco: Never   Vaping Use     Vaping status: Never Used   Substance and Sexual Activity     Alcohol use: Yes     Comment: occ     Drug use: No     Sexual activity: Yes     Partners: Female   Other Topics Concern     Parent/sibling w/ CABG, MI or  angioplasty before 65F 55M? Not Asked   Social History Narrative     Not on file     Social Drivers of Health     Financial Resource Strain: Low Risk  (11/19/2024)    Financial Resource Strain      Within the past 12 months, have you or your family members you live with been unable to get utilities (heat, electricity) when it was really needed?: No   Food Insecurity: Low Risk  (11/19/2024)    Food Insecurity      Within the past 12 months, did you worry that your food would run out before you got money to buy more?: No      Within the past 12 months, did the food you bought just not last and you didn t have money to get more?: No   Transportation Needs: Low Risk  (11/19/2024)    Transportation Needs      Within the past 12 months, has lack of transportation kept you from medical appointments, getting your medicines, non-medical meetings or appointments, work, or from getting things that you need?: No   Physical Activity: Not on file   Stress: Not on file   Social Connections: Socially Integrated (11/26/2023)    Received from [facility]    Social Connections      Do you often feel lonely or isolated from those around you?: 0   Interpersonal Safety: Low Risk  (5/20/2025)    Interpersonal Safety      Do you feel physically and emotionally safe where you currently live?: Yes      Within the past 12 months, have you been hit, slapped, kicked or otherwise physically hurt by someone?: No      Within the past 12 months, have you been humiliated or emotionally abused in other ways by your partner or ex-partner?: No   Housing Stability: Low Risk  (11/19/2024)    Housing Stability      Do you have housing? : Yes      Are you worried about losing your housing?: No            Allergies   Patient has no known allergies.    Today's clinic visit entailed:  I spent a total of 65 minutes on the day of the visit.   Time spent by me today doing chart review, history and exam, documentation and further  activities per the note  Provider  Link to MDM Help Grid     The level of medical decision making during this visit was of high complexity.    The longitudinal plan of care for the diagnosis(es)/condition(s) as documented were addressed during this visit. Due to the added complexity in care, I will continue to support Harshil in the subsequent management and with ongoing continuity of care.      Thank you for allowing me to participate in the care of your patient.      Sincerely,     Delicia Kc PA-C     [facility]  cc:   Chaparro Richardson MD  [id number]  [place] [address]

## 2025-06-24 NOTE — PROGRESS NOTES
Cardiology C.O.R.E (heart failure specialty) Clinic Progress Note    Clement Robert MRN# [id number]   YOB: 1965 Age: 59 year old     Primary cardiology team: Dr. Richardson (general) / Dr. Rhoades ([facility])         Assessment and Plan     In summary, Clement Robert presents today for a CORE clinic follow up visit.     HFrEF, severely dilated CMP  EF: 22% (11/2024 TTE); 17% on 12/2024 cMRI; 10-20% on 2/2025 TTE; 10-15%, LVIDD 7.3 cm on 6/2025 TTE (no improvement after CRT).  RV normal size and function.  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  Volume: Appears well-compensated at 173 lbs (home scale).  RHC and CPT 5/2025 reassuring. He demonstrated class I functional capacity, had normal biventricular filling pressures and of course a reduced cardiac index but only slightly at 2.3.   Renal fn nml. LFT's had been mildly elevated, now AST nml, ALT 73 (ULN 70).  GDMT: jardiance 10, Toprol 25, Losartan 12.5.  Did not tolerate half the low dose of Entresto d/t hypotension.   Device: CRT-D (implanted 2/2025) with appropriate BiV pacing.  Last HF admission 11/2024.    Minimal, non-obstructive CAD.  LDL now under excellent control on statin therapy (44).    PAF.  Isolated episode, none documented since 2011, anticoagulated.     Plan:  We're going to re-try stopping Losartan and starting half the low dose of Entresto. He'll reduce arnulfo to 12.5 mg daily and move his Toprol to bedtime to help avoid symptomatic hypotension.  Will monitor his LFT's but these have essentially normalized without intervention. I don't think the prior elevation was indicative of his HF status.   Will have Dr. Back review Harshil's device - QRS very wide and negative in V1 on post-implant EKG, ?changes to LV offset which may improve effectiveness of his CRT.  Repeat CMP, proBNP in four weeks. Echo/labs/visit with me in 3 months.  It seems that he is  "heading down the road to transplant. Update sent to Dr. Rhoades. He is scheduled to see him again in November. Harshil knows to notify us if his functional capacity changes.  Encouraged him to seek therapy/counseling at this time. He is receptive.    It was a pleasure seeing Harshil, as always.      [facility]         History of Presenting Illness     Clement Robert is a pleasant 59 year old patient with a pertinent history of the following -   PAF.  Diagnosed in 2011 after being hit by hockey puck and sent to ED. Underwent cardioversion.  EF normal at that time. No evidence of recurrence.  On Eliquis. [id number] sore is 1.  NICMP  Pt was lost to follow-up after 2011 until hospitalization in November 2024, with progressive dyspnea and intermittent palpitations, found to have a severely reduced ejection fraction of 20% with global hypokinesis and severe dilation with an LVIDD of 7.7 cm, normal RV function, mild MR. Trops normal.   Diuresed, placed on Toprol 25, Losartan 12.5, jardiance 10, no diuretics. Discharge wt 190 lbs.  Given concerns for ventricular ectopy and family history of sudden cardiac death, LifeVest was placed on discharge.  Cardiac MRI was completed 12/19/2024 that revealed severe nonischemic dilated cardiomyopathy with LVEF 17%.  There was no late enhancement to suggest prior infarct, inflammation, or infiltration.   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  Underwent CRT-D implantation 2/28/25.  LBBB, newly noted in 2020. Stress nuclear study at that time showed EF 56%, no ischemia.  Very mild CAD on 11/2024 angiogram.  +FH of SCD father dying from \"heart attack\" in his 40's.   Seizure disorder, migraines, anxiety.  No significant alcohol use. No illicit drug use.     Harshil has done fairly well since his hospitalization, with improvement in his functional capacity, no octavia dyspnea, orthopnea, edema, following a strict sodium restricted diet, and " "compliance with his LifeVest which has not recorded any arrhythmias. Unfortunately his ejection fraction on echocardiogram earlier today showed a stably reduced EF of 10-20%, with severe LV dilation (LVIDD 7.6 cm), mildly reduced RV function, mild MR and TR. I referred him for CRT-D implantation then, which took place in February. After that he met with Dr. Rhoades at [facility], and underwent RHC and cardiopulmonary stress testing which were actually fairly reassuring -he demonstrated class I functional capacity, had normal biventricular filling pressures and of course a reduced cardiac index but only slightly at 2.3.  Dr. Rhoades recommended repeat echo took place earlier this month, unfortunately again showing no EF improvement at 10-15%, LVIDD 7.3 cm.  RV normal size and function, mild MR.    Today, Harshil returns to clinic stating he's started to notice subtle increases in his shortness of breath over the prior couple months, but overall has continued to be active without significant limitations, including swimming. He continues to have overall rare positional lightheadedness. He denies chest pain, orthopnea, edema. Weighing daily, following strict sodium restriction. Remains understandably anxious, with again many appropriate questions about prognosis/management.     Renal fn nml. LFT's had been mildly elevated, now AST nml, ALT 73 (ULN 70). proBNP up slightly at 2100.  Device interrogation 4/24/25 shows 95.5% biventricular paced with 3.7% V SR, 0.7% atrial paced, no arrhythmias.  Post implant EKG did show a negative QRS in lead V1, also quite wide at 180 ms. EKG today looks the same.         Review of Systems     12-pt ROS is negative except for as noted in the HPI.          Physical Exam     Vitals: /62   Pulse 71   Ht 1.778 m (5' 10\")   Wt 79.8 kg (176 lb)   SpO2 97%   BMI 25.25 kg/m    Wt Readings from Last 10 Encounters:   06/24/25 79.8 kg (176 lb)   05/20/25 80.2 kg (176 lb 12.9 oz)   04/18/25 82.3 kg " (181 lb 8 oz)   03/06/25 83.9 kg (185 lb)   02/28/25 82.1 kg (181 lb)   02/11/25 84.8 kg (186 lb 14.4 oz)   01/15/25 81.4 kg (179 lb 8 oz)   01/13/25 84.7 kg (186 lb 12.8 oz)   01/06/25 85.2 kg (187 lb 14.4 oz)   12/24/24 84.8 kg (187 lb)       Constitutional:  Patient is pleasant, alert, cooperative, and in NAD.  HEENT:  NCAT. PERRLA. EOM's intact.   Neck:  CVP appears normal. No carotid bruits.   Pulmonary: Normal respiratory effort. CTAB.   Cardiac: RRR, normal S1/S2, no S3/S4, no murmur or rub.   Abdomen:  Non-tender abdomen, no hepatosplenomegaly appreciated.   Vascular: Pulses in the upper and lower extremities are 2+ and equal bilaterally.  Extremities: No edema, erythema, cyanosis or tenderness appreciated.  Skin:  No rashes or lesions appreciated.   Neurological:  No gross motor or sensory deficits.   Psych: Appropriate affect.          Data   Labs reviewed:  Recent Labs   Lab Test 06/24/25  1232 04/18/25  1028 02/20/25  0805 02/11/25  1200 11/25/24  0830 11/19/24  0642 11/18/24  1441   LDL  --   --   --  44  --  145*  --    HDL  --   --   --  54  --  65  --    NHDL  --   --   --  58  --  154*  --    CHOL  --   --   --  112  --  219*  --    TRIG  --   --   --  69  --  47  --    TSH  --   --   --   --   --   --  4.12   NTBNP 2,112* 1,674* 1,079* 1,830*   < >  --   --     < > = values in this interval not displayed.       Lab Results   Component Value Date    WBC 5.0 05/20/2025    WBC 8.5 11/18/2011    RBC 5.35 05/20/2025    RBC 4.90 11/18/2011    HGB 15.6 05/20/2025    HGB 15.8 05/20/2025    HGB 15.3 11/18/2011    HCT 46.5 05/20/2025    HCT 43.2 11/18/2011    MCV 87 05/20/2025    MCV 88 11/18/2011    MCH 29.5 05/20/2025    MCH 31.2 11/18/2011    MCHC 34.0 05/20/2025    MCHC 35.4 11/18/2011    RDW 11.9 05/20/2025    RDW 12.1 11/18/2011     05/20/2025     11/18/2011       Lab Results   Component Value Date     06/24/2025     11/18/2011    POTASSIUM 4.1 06/24/2025    POTASSIUM 4.0  11/18/2011    CHLORIDE 104 06/24/2025    CHLORIDE 104 11/18/2011    CO2 24 06/24/2025    CO2 26 11/18/2011    ANIONGAP 9 06/24/2025    ANIONGAP 7 11/18/2011     (H) 06/24/2025    GLC 96 11/18/2011    BUN 15.6 06/24/2025    BUN 12 11/18/2011    CR 0.87 06/24/2025    CR 0.74 11/18/2011    GFRESTIMATED >90 06/24/2025    GFRESTIMATED >90 11/18/2011    GFRESTBLACK >90 11/18/2011    JAMES 9.0 06/24/2025    JAMES 8.7 11/18/2011      Lab Results   Component Value Date    AST 39 06/24/2025    AST 41 07/29/2010    ALT 73 (H) 06/24/2025    ALT     34 11/28/2011    ALT 34 11/28/2011       Lab Results   Component Value Date    A1C 5.1 11/18/2024       Lab Results   Component Value Date    INR 0.99 11/18/2024            Problem List     Patient Active Problem List   Diagnosis    Seizures (H)    Anxiety    Epilepsy without status epilepticus, not intractable, unspecified    LBBB (left bundle branch block)    Major depressive disorder, single episode, unspecified    Polyp of colon    Chronic systolic congestive heart failure (H)    Non-ischemic cardiomyopathy (H)    Paroxysmal A-fib (H)            Medications     Current Outpatient Medications   Medication Sig Dispense Refill    apixaban ANTICOAGULANT (ELIQUIS) 5 MG tablet Take 1 tablet (5 mg) by mouth 2 times daily. 180 tablet 3    busPIRone (BUSPAR) 5 MG tablet Take 10 mg by mouth at bedtime. Rx managed by Neurology      busPIRone (BUSPAR) 5 MG tablet Take 5 mg by mouth every morning. Rx managed by Neurology      empagliflozin (JARDIANCE) 10 MG TABS tablet Take 1 tablet (10 mg) by mouth daily. 90 tablet 3    levETIRAcetam (KEPPRA) 500 MG tablet Take 1,000 mg by mouth at bedtime. Rx managed by Neurology      levETIRAcetam (KEPPRA) 500 MG tablet Take 500 mg by mouth every morning.  6    losartan (COZAAR) 25 MG tablet Take 0.5 tablets (12.5 mg) by mouth daily. 45 tablet 3    metoprolol succinate ER (TOPROL XL) 25 MG 24 hr tablet Take 1 tablet (25 mg) by mouth daily. 90 tablet 3     nortriptyline (PAMELOR) 75 MG capsule TAKE 1 CAPSULE BY MOUTH AT BEDTIME*      rosuvastatin (CRESTOR) 20 MG tablet Take 1 tablet (20 mg) by mouth at bedtime. 90 tablet 3    spironolactone (ALDACTONE) 25 MG tablet Take 1 tablet (25 mg) by mouth daily. 90 tablet 3            Past Medical History     Past Medical History:   Diagnosis Date    Anxiety     Non-ischemic cardiomyopathy (H) 03/06/2025    Paroxysmal A-fib (H) 03/06/2025    Seizures (H)      Past Surgical History:   Procedure Laterality Date    CV CORONARY ANGIOGRAM N/A 11/20/2024    Procedure: Coronary Angiogram;  Surgeon: Dominick Das MD;  Location:  HEART CARDIAC CATH LAB    CV RIGHT HEART CATH MEASUREMENTS RECORDED N/A 5/20/2025    Procedure: Heart Cath Right Heart Cath;  Surgeon: Chirag Rhoades MD;  Location:  HEART CARDIAC CATH LAB    EP ICD INSERT BIVENT N/A 2/28/2025    Procedure: Implantable Cardioverter Defibrillator Device & Lead Implant Biventricular;  Surgeon: Talat Back MD;  Location:  HEART CARDIAC CATH LAB     Family History   Problem Relation Age of Onset    Family History Negative Mother     Family History Negative Father      Social History     Socioeconomic History    Marital status:      Spouse name: Not on file    Number of children: Not on file    Years of education: Not on file    Highest education level: Not on file   Occupational History    Not on file   Tobacco Use    Smoking status: Never    Smokeless tobacco: Never   Vaping Use    Vaping status: Never Used   Substance and Sexual Activity    Alcohol use: Yes     Comment: occ    Drug use: No    Sexual activity: Yes     Partners: Female   Other Topics Concern    Parent/sibling w/ CABG, MI or angioplasty before 65F 55M? Not Asked   Social History Narrative    Not on file     Social Drivers of Health     Financial Resource Strain: Low Risk  (11/19/2024)    Financial Resource Strain     Within the past 12 months, have you or your family members you live  with been unable to get utilities (heat, electricity) when it was really needed?: No   Food Insecurity: Low Risk  (11/19/2024)    Food Insecurity     Within the past 12 months, did you worry that your food would run out before you got money to buy more?: No     Within the past 12 months, did the food you bought just not last and you didn t have money to get more?: No   Transportation Needs: Low Risk  (11/19/2024)    Transportation Needs     Within the past 12 months, has lack of transportation kept you from medical appointments, getting your medicines, non-medical meetings or appointments, work, or from getting things that you need?: No   Physical Activity: Not on file   Stress: Not on file   Social Connections: Socially Integrated (11/26/2023)    Received from [facility]    Social Connections     Do you often feel lonely or isolated from those around you?: 0   Interpersonal Safety: Low Risk  (5/20/2025)    Interpersonal Safety     Do you feel physically and emotionally safe where you currently live?: Yes     Within the past 12 months, have you been hit, slapped, kicked or otherwise physically hurt by someone?: No     Within the past 12 months, have you been humiliated or emotionally abused in other ways by your partner or ex-partner?: No   Housing Stability: Low Risk  (11/19/2024)    Housing Stability     Do you have housing? : Yes     Are you worried about losing your housing?: No            Allergies   Patient has no known allergies.    Today's clinic visit entailed:  I spent a total of 65 minutes on the day of the visit.   Time spent by me today doing chart review, history and exam, documentation and further activities per the note  Provider  Link to [facility] Help Grid     The level of medical decision making during this visit was of high complexity.    The longitudinal plan of care for the diagnosis(es)/condition(s) as documented were addressed during this visit. Due to the added  complexity in care, I will continue to support Harshil in the subsequent management and with ongoing continuity of care.

## 2025-06-24 NOTE — TELEPHONE ENCOUNTER
----- Message -----  From: Delicia Giraldo PA-C  Sent: 6/24/2025   2:43 PM CDT  To: Chirag Rhoades MD; Talat Back MD; Mandi Malave#  Subject: Visit today                                      Hi team! I saw Harshil today for routine follow up.     Dr. Rhoades, his FC is stable, LFT's better, proBNP up slightly. We are going to re-trial Entresto. I'll repeat his labs in a month, and set him up for a repeat echo and visit with me in 3 months. He's scheduled to see you in November.     Dr. Back/device team, can we make any adjustments to his device to try and improve CRT effectiveness? No EF improvement ~4 months post-CRT. QRS quite wide (180 msec) and negative in V1.     Thank you!    Delicia Giraldo PA-C  [facility]      ----- Message -----  From: Courtney Otero RN  Sent: 6/24/2025   3:10 PM CDT  To: Delicia Giraldo PA-C; Chirag Rosario#  Subject: RE: Visit today                                  Regarding device settings....    His BVP percentage is pretty good, 95.5% BVP + 3.7% VSR. The LV Vector is set at LV2-->LV1. We could try changing the pacing vector and checking a 12 lead to see if we can get a narrower QRS (we would want to do this on a day Dr. Back is in the clinic so she can help us decide on a better vector based on the 12 lead).     Let us know if we should proceed with this    Courtney Nielson      ----- Message from Chirag Rhoades sent at 6/24/2025  3:14 PM CDT -----  Regarding: RE: Visit today  I think its worth a shot, and if it has the chance to improve his EF just slightly more or decrease LV size that may postpone need for lvad or transplant in the next few years        Please refer to message thread attached above.    I placed a hold on Dr. Back's 8/14 1315 slot and DCR1 1300's slot so that device check can be completed with Dr. Back present so that she can choose an acceptable LV vector.    Will route to Dr. Back to determine if she thinks this plan  is appropriate (schedule patient with her to ensure she will have the time to review EKGs live while vector testing is complete).     IF this plan is ok'd, please call patient to inform him of plan and get him scheduled on these hold slots. DCR4 or DCR2 on that day will likely need to plan on taking DCR1 (Courtney's) 1:45p patient to allow her adequate time to complete this check.    IF this plan is not ok'd, please cancel hold slots on these appointments.    ELVIN ALMANZAR

## 2025-06-25 ENCOUNTER — TELEPHONE (OUTPATIENT)
Dept: CARDIOLOGY | Facility: CLINIC | Age: 60
End: 2025-06-25
Payer: COMMERCIAL

## 2025-06-25 DIAGNOSIS — Z95.810 ICD (IMPLANTABLE CARDIOVERTER-DEFIBRILLATOR) IN PLACE: Primary | ICD-10-CM

## 2025-06-25 DIAGNOSIS — I44.7 LBBB (LEFT BUNDLE BRANCH BLOCK): ICD-10-CM

## 2025-06-25 NOTE — TELEPHONE ENCOUNTER
----- Message from Delicia Kc sent at 6/24/2025  5:40 PM CDT -----  Regarding: RE: Visit today  Sounds good, device can you contact Harshil to arrange it?    ----- Message -----  From: Talat Back MD  Sent: 6/24/2025   4:13 PM CDT  To: Courtney Otero RN; Delicia MAY#  Subject: RE: Visit today                                  I will be rounding but I can be available early tomorrow morning if he is able to come in and most mornings next week.      Unfortunately we didn't have great options for CS branches so the LV lead location is probably sub-optimal.  If we can't get much improvement with programming changes, we could consider bringing him back in and placing a left bundle lead.    ----- Message -----  From: Chirag Rhoades MD  Sent: 6/24/2025   3:15 PM CDT  To: Courtney Otero RN; Delicia MAY#  Subject: RE: Visit today                                  I think its worth a shot, and if it has the chance to improve his EF just slightly more or decrease LV size that may postpone need for lvad or transplant in the next few years    ----- Message -----  From: Courtney Otero RN  Sent: 6/24/2025   3:10 PM CDT  To: Delicia Giraldo PA-C; Chirag Rosario#  Subject: RE: Visit today                                  Regarding device settings....    His BVP percentage is pretty good, 95.5% BVP + 3.7% VSR. The LV Vector is set at LV2-->LV1. We could try changing the pacing vector and checking a 12 lead to see if we can get a narrower QRS (we would want to do this on a day Dr. Back is in the clinic so she can help us decide on a better vector based on the 12 lead).     Let us know if we should proceed with this    Courtney Nielson    ----- Message -----  From: Delicia Giraldo PA-C  Sent: 6/24/2025   2:43 PM CDT  To: Chirag Rhoades MD; Talat Back MD; Mandi #  Subject: Visit today                                      Hi team! I saw Harshil today for routine follow up.       [place], his FC is stable, LFT's better, proBNP up slightly. We are going to re-trial Entresto. I'll repeat his labs in a month, and set him up for a repeat echo and visit with me in 3 months. He's scheduled to see you in November.     Dr. Back/device team, can we make any adjustments to his device to try and improve CRT effectiveness? No EF improvement ~4 months post-CRT. QRS quite wide (180 msec) and negative in V1.     Thank you!    Delicia Giraldo PA-C  [facility]        Received above messages from Delicia ROD, Dr. Padilla, and Dr. Rhoades. Plan is to bring pt in to device clinic for LV vector testing, run a 12 lead EKG and have Dr. Back decide if there is a better pacing vector we can use for the LV lead, or perhaps adjust the LV offset. This should be scheduled on a day that Dr. Back is in the clinic so we can review the setting changes and 12 lead EKG with her. Pt has a [employer] Gardner CRT-D implanted in 2/2025.     Called pt, no answer, LVM that we want to schedule an appointment to make some setting changes so the device will be optimized, asked pt to call back to device clinic to schedule this    [facility] RN callback number: [phone]        Right now, based on device clinic schedule and EKG schedule, good times would be 7/2/2025 at 10:30am (Dr. Back rounding) or 1:00pm (Dr. Back in clinic) or 7/8/2025 at 1:00pm (Dr. Back in clinic).     Upon further chart review pt has already been scheduled for device check on 7/2/2025 at 9:00am.     This encounter is a duplicate from Vida ALMANZAR's encounter yesterday. Please disregard.

## 2025-07-02 ENCOUNTER — ANCILLARY PROCEDURE (OUTPATIENT)
Dept: CARDIOLOGY | Facility: CLINIC | Age: 60
End: 2025-07-02
Attending: INTERNAL MEDICINE
Payer: COMMERCIAL

## 2025-07-02 DIAGNOSIS — Z95.810 ICD (IMPLANTABLE CARDIOVERTER-DEFIBRILLATOR) IN PLACE: ICD-10-CM

## 2025-07-02 DIAGNOSIS — I44.7 LBBB (LEFT BUNDLE BRANCH BLOCK): ICD-10-CM

## 2025-07-02 DIAGNOSIS — I42.8 NON-ISCHEMIC CARDIOMYOPATHY (H): ICD-10-CM

## 2025-07-02 DIAGNOSIS — I50.22 CHRONIC SYSTOLIC CONGESTIVE HEART FAILURE (H): ICD-10-CM

## 2025-07-02 LAB
MDC_IDC_EPISODE_DTM: NORMAL
MDC_IDC_EPISODE_DTM: NORMAL
MDC_IDC_EPISODE_DURATION: 1 S
MDC_IDC_EPISODE_DURATION: 1 S
MDC_IDC_EPISODE_ID: 2
MDC_IDC_EPISODE_ID: 3
MDC_IDC_EPISODE_TYPE: NORMAL
MDC_IDC_EPISODE_TYPE: NORMAL
MDC_IDC_LEAD_CONNECTION_STATUS: NORMAL
MDC_IDC_LEAD_IMPLANT_DT: NORMAL
MDC_IDC_LEAD_LOCATION: NORMAL
MDC_IDC_LEAD_LOCATION_DETAIL_1: NORMAL
MDC_IDC_LEAD_MFG: NORMAL
MDC_IDC_LEAD_MODEL: NORMAL
MDC_IDC_LEAD_POLARITY_TYPE: NORMAL
MDC_IDC_LEAD_SERIAL: NORMAL
MDC_IDC_MSMT_BATTERY_DTM: NORMAL
MDC_IDC_MSMT_BATTERY_REMAINING_LONGEVITY: 136 MO
MDC_IDC_MSMT_BATTERY_RRT_TRIGGER: NORMAL
MDC_IDC_MSMT_BATTERY_VOLTAGE: 3.07 V
MDC_IDC_MSMT_CAP_CHARGE_DTM: NORMAL
MDC_IDC_MSMT_CAP_CHARGE_ENERGY: 18 J
MDC_IDC_MSMT_CAP_CHARGE_TIME: 3.6 S
MDC_IDC_MSMT_CAP_CHARGE_TYPE: NORMAL
MDC_IDC_MSMT_LEADCHNL_LV_IMPEDANCE_VALUE: 399 OHM
MDC_IDC_MSMT_LEADCHNL_LV_IMPEDANCE_VALUE: 418 OHM
MDC_IDC_MSMT_LEADCHNL_LV_IMPEDANCE_VALUE: 456 OHM
MDC_IDC_MSMT_LEADCHNL_LV_IMPEDANCE_VALUE: 456 OHM
MDC_IDC_MSMT_LEADCHNL_LV_IMPEDANCE_VALUE: 608 OHM
MDC_IDC_MSMT_LEADCHNL_LV_IMPEDANCE_VALUE: 760 OHM
MDC_IDC_MSMT_LEADCHNL_LV_IMPEDANCE_VALUE: 779 OHM
MDC_IDC_MSMT_LEADCHNL_LV_IMPEDANCE_VALUE: 798 OHM
MDC_IDC_MSMT_LEADCHNL_LV_PACING_THRESHOLD_AMPLITUDE: 0.88 V
MDC_IDC_MSMT_LEADCHNL_LV_PACING_THRESHOLD_AMPLITUDE: 1 V
MDC_IDC_MSMT_LEADCHNL_LV_PACING_THRESHOLD_PULSEWIDTH: 0.4 MS
MDC_IDC_MSMT_LEADCHNL_LV_PACING_THRESHOLD_PULSEWIDTH: 0.4 MS
MDC_IDC_MSMT_LEADCHNL_RA_IMPEDANCE_VALUE: 399 OHM
MDC_IDC_MSMT_LEADCHNL_RA_PACING_THRESHOLD_AMPLITUDE: 0.38 V
MDC_IDC_MSMT_LEADCHNL_RA_PACING_THRESHOLD_AMPLITUDE: 0.5 V
MDC_IDC_MSMT_LEADCHNL_RA_PACING_THRESHOLD_PULSEWIDTH: 0.4 MS
MDC_IDC_MSMT_LEADCHNL_RA_PACING_THRESHOLD_PULSEWIDTH: 0.4 MS
MDC_IDC_MSMT_LEADCHNL_RA_SENSING_INTR_AMPL: 4.8 MV
MDC_IDC_MSMT_LEADCHNL_RV_IMPEDANCE_VALUE: 304 OHM
MDC_IDC_MSMT_LEADCHNL_RV_IMPEDANCE_VALUE: 380 OHM
MDC_IDC_MSMT_LEADCHNL_RV_PACING_THRESHOLD_AMPLITUDE: 0.75 V
MDC_IDC_MSMT_LEADCHNL_RV_PACING_THRESHOLD_AMPLITUDE: 0.75 V
MDC_IDC_MSMT_LEADCHNL_RV_PACING_THRESHOLD_PULSEWIDTH: 0.4 MS
MDC_IDC_MSMT_LEADCHNL_RV_PACING_THRESHOLD_PULSEWIDTH: 0.4 MS
MDC_IDC_MSMT_LEADCHNL_RV_SENSING_INTR_AMPL: 19.3 MV
MDC_IDC_PG_IMPLANT_DTM: NORMAL
MDC_IDC_PG_MFG: NORMAL
MDC_IDC_PG_MODEL: NORMAL
MDC_IDC_PG_SERIAL: NORMAL
MDC_IDC_PG_TYPE: NORMAL
MDC_IDC_SESS_CLINIC_NAME: NORMAL
MDC_IDC_SESS_DTM: NORMAL
MDC_IDC_SESS_TYPE: NORMAL
MDC_IDC_SET_BRADY_AT_MODE_SWITCH_RATE: 171 {BEATS}/MIN
MDC_IDC_SET_BRADY_LOWRATE: 50 {BEATS}/MIN
MDC_IDC_SET_BRADY_MAX_SENSOR_RATE: 120 {BEATS}/MIN
MDC_IDC_SET_BRADY_MAX_TRACKING_RATE: 130 {BEATS}/MIN
MDC_IDC_SET_BRADY_MODE: NORMAL
MDC_IDC_SET_BRADY_PAV_DELAY_HIGH: 100 MS
MDC_IDC_SET_BRADY_PAV_DELAY_LOW: 130 MS
MDC_IDC_SET_BRADY_SAV_DELAY_HIGH: 70 MS
MDC_IDC_SET_BRADY_SAV_DELAY_LOW: 100 MS
MDC_IDC_SET_CRT_PACED_CHAMBERS: NORMAL
MDC_IDC_SET_LEADCHNL_LV_PACING_AMPLITUDE: 1.5 V
MDC_IDC_SET_LEADCHNL_LV_PACING_ANODE_ELECTRODE_1: NORMAL
MDC_IDC_SET_LEADCHNL_LV_PACING_ANODE_LOCATION_1: NORMAL
MDC_IDC_SET_LEADCHNL_LV_PACING_CAPTURE_MODE: NORMAL
MDC_IDC_SET_LEADCHNL_LV_PACING_CATHODE_ELECTRODE_1: NORMAL
MDC_IDC_SET_LEADCHNL_LV_PACING_CATHODE_LOCATION_1: NORMAL
MDC_IDC_SET_LEADCHNL_LV_PACING_POLARITY: NORMAL
MDC_IDC_SET_LEADCHNL_LV_PACING_PULSEWIDTH: 0.4 MS
MDC_IDC_SET_LEADCHNL_RA_PACING_AMPLITUDE: 3 V
MDC_IDC_SET_LEADCHNL_RA_PACING_ANODE_ELECTRODE_1: NORMAL
MDC_IDC_SET_LEADCHNL_RA_PACING_ANODE_LOCATION_1: NORMAL
MDC_IDC_SET_LEADCHNL_RA_PACING_CATHODE_ELECTRODE_1: NORMAL
MDC_IDC_SET_LEADCHNL_RA_PACING_CATHODE_LOCATION_1: NORMAL
MDC_IDC_SET_LEADCHNL_RA_PACING_POLARITY: NORMAL
MDC_IDC_SET_LEADCHNL_RA_PACING_PULSEWIDTH: 0.4 MS
MDC_IDC_SET_LEADCHNL_RA_SENSING_ANODE_ELECTRODE_1: NORMAL
MDC_IDC_SET_LEADCHNL_RA_SENSING_ANODE_LOCATION_1: NORMAL
MDC_IDC_SET_LEADCHNL_RA_SENSING_CATHODE_ELECTRODE_1: NORMAL
MDC_IDC_SET_LEADCHNL_RA_SENSING_CATHODE_LOCATION_1: NORMAL
MDC_IDC_SET_LEADCHNL_RA_SENSING_POLARITY: NORMAL
MDC_IDC_SET_LEADCHNL_RA_SENSING_SENSITIVITY: 0.3 MV
MDC_IDC_SET_LEADCHNL_RV_SENSING_ANODE_ELECTRODE_1: NORMAL
MDC_IDC_SET_LEADCHNL_RV_SENSING_ANODE_LOCATION_1: NORMAL
MDC_IDC_SET_LEADCHNL_RV_SENSING_CATHODE_ELECTRODE_1: NORMAL
MDC_IDC_SET_LEADCHNL_RV_SENSING_CATHODE_LOCATION_1: NORMAL
MDC_IDC_SET_LEADCHNL_RV_SENSING_POLARITY: NORMAL
MDC_IDC_SET_LEADCHNL_RV_SENSING_SENSITIVITY: 0.3 MV
MDC_IDC_SET_ZONE_DETECTION_BEATS_DENOMINATOR: 16 {BEATS}
MDC_IDC_SET_ZONE_DETECTION_BEATS_DENOMINATOR: 32 {BEATS}
MDC_IDC_SET_ZONE_DETECTION_BEATS_DENOMINATOR: 40 {BEATS}
MDC_IDC_SET_ZONE_DETECTION_BEATS_NUMERATOR: 16 {BEATS}
MDC_IDC_SET_ZONE_DETECTION_BEATS_NUMERATOR: 30 {BEATS}
MDC_IDC_SET_ZONE_DETECTION_BEATS_NUMERATOR: 32 {BEATS}
MDC_IDC_SET_ZONE_DETECTION_INTERVAL: 300 MS
MDC_IDC_SET_ZONE_DETECTION_INTERVAL: 350 MS
MDC_IDC_SET_ZONE_DETECTION_INTERVAL: 360 MS
MDC_IDC_SET_ZONE_DETECTION_INTERVAL: 360 MS
MDC_IDC_SET_ZONE_STATUS: NORMAL
MDC_IDC_SET_ZONE_TYPE: NORMAL
MDC_IDC_SET_ZONE_VENDOR_TYPE: NORMAL
MDC_IDC_STAT_AT_BURDEN_PERCENT: 0 %
MDC_IDC_STAT_AT_DTM_END: NORMAL
MDC_IDC_STAT_AT_DTM_START: NORMAL
MDC_IDC_STAT_BRADY_AP_VP_PERCENT: 0.41 %
MDC_IDC_STAT_BRADY_AP_VS_PERCENT: 0.02 %
MDC_IDC_STAT_BRADY_AS_VP_PERCENT: 97.1 %
MDC_IDC_STAT_BRADY_AS_VS_PERCENT: 2.47 %
MDC_IDC_STAT_BRADY_DTM_END: NORMAL
MDC_IDC_STAT_BRADY_DTM_START: NORMAL
MDC_IDC_STAT_BRADY_RA_PERCENT_PACED: 0.56 %
MDC_IDC_STAT_BRADY_RV_PERCENT_PACED: 7.73 %
MDC_IDC_STAT_CRT_DTM_END: NORMAL
MDC_IDC_STAT_CRT_DTM_START: NORMAL
MDC_IDC_STAT_CRT_LV_PERCENT_PACED: 97.47 %
MDC_IDC_STAT_CRT_PERCENT_PACED: 7.7 %
MDC_IDC_STAT_EPISODE_RECENT_COUNT: 0
MDC_IDC_STAT_EPISODE_RECENT_COUNT: 2
MDC_IDC_STAT_EPISODE_RECENT_COUNT_DTM_END: NORMAL
MDC_IDC_STAT_EPISODE_RECENT_COUNT_DTM_START: NORMAL
MDC_IDC_STAT_EPISODE_TOTAL_COUNT: 0
MDC_IDC_STAT_EPISODE_TOTAL_COUNT: 3
MDC_IDC_STAT_EPISODE_TOTAL_COUNT_DTM_END: NORMAL
MDC_IDC_STAT_EPISODE_TOTAL_COUNT_DTM_START: NORMAL
MDC_IDC_STAT_EPISODE_TYPE: NORMAL
MDC_IDC_STAT_TACHYTHERAPY_ATP_DELIVERED_RECENT: 0
MDC_IDC_STAT_TACHYTHERAPY_ATP_DELIVERED_TOTAL: 0
MDC_IDC_STAT_TACHYTHERAPY_RECENT_DTM_END: NORMAL
MDC_IDC_STAT_TACHYTHERAPY_RECENT_DTM_START: NORMAL
MDC_IDC_STAT_TACHYTHERAPY_SHOCKS_ABORTED_RECENT: 0
MDC_IDC_STAT_TACHYTHERAPY_SHOCKS_ABORTED_TOTAL: 0
MDC_IDC_STAT_TACHYTHERAPY_SHOCKS_DELIVERED_RECENT: 0
MDC_IDC_STAT_TACHYTHERAPY_SHOCKS_DELIVERED_TOTAL: 0
MDC_IDC_STAT_TACHYTHERAPY_TOTAL_DTM_END: NORMAL
MDC_IDC_STAT_TACHYTHERAPY_TOTAL_DTM_START: NORMAL

## 2025-07-02 PROCEDURE — 93281 PM DEVICE PROGR EVAL MULTI: CPT | Performed by: INTERNAL MEDICINE

## 2025-07-07 ENCOUNTER — MYC MEDICAL ADVICE (OUTPATIENT)
Dept: INTERNAL MEDICINE | Facility: CLINIC | Age: 60
End: 2025-07-07
Payer: COMMERCIAL

## 2025-07-24 ENCOUNTER — RESULTS FOLLOW-UP (OUTPATIENT)
Dept: CARDIOLOGY | Facility: CLINIC | Age: 60
End: 2025-07-24

## 2025-07-24 ENCOUNTER — LAB (OUTPATIENT)
Dept: LAB | Facility: CLINIC | Age: 60
End: 2025-07-24
Payer: COMMERCIAL

## 2025-07-24 DIAGNOSIS — I50.22 CHRONIC SYSTOLIC CONGESTIVE HEART FAILURE (H): ICD-10-CM

## 2025-07-24 LAB
ALBUMIN SERPL BCG-MCNC: 4.4 G/DL (ref 3.5–5.2)
ALP SERPL-CCNC: 92 U/L (ref 40–150)
ALT SERPL W P-5'-P-CCNC: 72 U/L (ref 0–70)
ANION GAP SERPL CALCULATED.3IONS-SCNC: 10 MMOL/L (ref 7–15)
AST SERPL W P-5'-P-CCNC: 36 U/L (ref 0–45)
BILIRUB SERPL-MCNC: 0.7 MG/DL
BUN SERPL-MCNC: 14.1 MG/DL (ref 8–23)
CALCIUM SERPL-MCNC: 9.1 MG/DL (ref 8.8–10.4)
CHLORIDE SERPL-SCNC: 102 MMOL/L (ref 98–107)
CREAT SERPL-MCNC: 1 MG/DL (ref 0.67–1.17)
EGFRCR SERPLBLD CKD-EPI 2021: 87 ML/MIN/1.73M2
GLUCOSE SERPL-MCNC: 85 MG/DL (ref 70–99)
HCO3 SERPL-SCNC: 27 MMOL/L (ref 22–29)
NT-PROBNP SERPL-MCNC: 1128 PG/ML (ref 0–177)
POTASSIUM SERPL-SCNC: 4.6 MMOL/L (ref 3.4–5.3)
PROT SERPL-MCNC: 6.2 G/DL (ref 6.4–8.3)
SODIUM SERPL-SCNC: 139 MMOL/L (ref 135–145)

## 2025-07-24 NOTE — TELEPHONE ENCOUNTER
[facility] - C.O.R.E. Clinic     My Chart sent to pt with lab results and requested an update on how he is doing.        Future Appointments   Date Time Provider Department Center   7/31/2025 12:00 AM [facility]   9/24/2025  8:30 AM RSCCECHO2 RHCVCC RSCC   9/24/2025  9:30 AM  LAB RHCLB [facility]   9/30/2025  1:30 PM Delicia Giraldo PA-C [facility] PSA CLIN   11/21/2025  9:00 AM  LAB [facility]CLABR [facility]   11/21/2025  9:30 AM Chirag Rhoades MD [facility]           Tammy Davis, HELENN, RN 3:16 PM 07/24/25

## 2025-07-24 NOTE — TELEPHONE ENCOUNTER
"[facility] - C.O.R.E. Clinic   Pt responded to My Chart:   \"Thank you Tammy. I will take this as all decent news. My home weight has been steady-- usually between 172-174 on my home scale first thing in the morning -- down from about 195ish in the hospital in November. I m feeling pretty good overall and think I m doing fine with the Entresto/arnulfo combo. Sometimes BP is a  little low but I m fine and just drink more water, and am Doing my normal things. I m game to move to a full dose of Entresto and move off of arnulfo if best, but of course not my call.  I just keep reading that Entresto is a good med, but I will stop playing doctor :). Thanks so much for your follow up. It means a lot!  Harshil  Should clarify -- I am taking a full dose of Entresto. Half in the morning and half at night. \"      My Chart message sent back inquiring about fluid intake and BPs.   Provided education on Entresto and spironolactone.     Will wait for BP update and then review with Delicia.      Tammy Davis, HELENN, RN 4:44 PM 07/24/25    "

## 2025-07-25 NOTE — TELEPHONE ENCOUNTER
"[facility] - C.O.R.E. Clinic   Pt responded through My Chart: \" That helps a lot and sounds like I should be on both. Whatever it takes! My BP probably averages about 95/75 and ranges from  systolic and 50-75 diastolic. I can log each day for a week and report back more specifically. I probably drink the equivalent of 6-8 glasses of water a day. Thanks!! \"          Recent blood pressures:        Update to Delicia      Future Appointments   Date Time Provider Department Center   7/31/2025 12:00 [facility]   9/24/2025  8:30 AM RSCCECHO2 RHCV RSCC   9/24/2025  9:30 AM  LAB RHB [place] RID   9/30/2025  1:30 PM Delicia Giraldo PA-C [facility] PSA CLIN   11/21/2025  9:00 AM  LAB UCLABR [facility]   11/21/2025  9:30 AM Chirag Rhoades MD [facility]       Tammy Davis, HELENN, RN 9:06 AM 07/25/25    "

## 2025-07-25 NOTE — TELEPHONE ENCOUNTER
No med changes at this time - thanks. Please encourage him to call us if he develops lightheadedness!

## 2025-07-28 NOTE — TELEPHONE ENCOUNTER
Pt updated through My Chart      Future Appointments   Date Time Provider Department Center   7/31/2025 12:00 [facility]   9/24/2025  8:30 AM RSCCECHO2 RHCVCC RSCC   9/24/2025  9:30 AM RU LAB RHCLB [facility] RID   9/30/2025  1:30 PM [facility]   11/21/2025  9:00 AM  LAB [facility]   11/21/2025  9:30 AM Chirag Rhoades MD [facility]       Tammy Davis, HELENN, RN 9:01 AM 07/28/25

## 2025-08-16 ENCOUNTER — HEALTH MAINTENANCE LETTER (OUTPATIENT)
Age: 60
End: 2025-08-16

## (undated) DEVICE — CATH DIAGNOSTIC RADIAL 5FR TIG 4.0

## (undated) DEVICE — SHEATH PRELUDE SNAP 13CM 9FR

## (undated) DEVICE — SLEEVE TR BAND RADIAL COMPRESSION DEVICE 24CM TRB24-REG

## (undated) DEVICE — DEFIB PRO-PADZ LVP LQD GEL ADULT 8900-2105-01

## (undated) DEVICE — CATH EP 6FR 2MM TIP 2-8-2 115C

## (undated) DEVICE — POUCH TYRX ABSORB ANTIBACTERIAL LG

## (undated) DEVICE — MANIFOLD KIT ANGIO AUTOMATED 014613

## (undated) DEVICE — Device

## (undated) DEVICE — RAD INTRODUCER KIT MICRO 5FRX10CM .018 NITINOL G/W

## (undated) DEVICE — SU ETHIBOND 0 CT-1 30" X424H

## (undated) DEVICE — CATH GUIDE 7FR 65CM 130DEG CURVTIP ATTAIN SELCT II SURVLV 62

## (undated) DEVICE — WIRE GUIDE 0.035"X260CM SAFE-T-J EXCHANGE G00517

## (undated) DEVICE — CATH GD 50CM ATTAIN COMMAND SU

## (undated) DEVICE — PACK HEART RIGHT CUSTOM SAN32RHF18

## (undated) DEVICE — SLITTER ADJSTBL 6232ADJ

## (undated) DEVICE — KIT RIGHT HEART CATH 60130719

## (undated) DEVICE — WIRE GLIDE 0.035"X150CM 3CM ANG REG UWR6035

## (undated) DEVICE — CATH ANGIO WEDGE PRESSURE 6FRX110CM DL AI-07126

## (undated) DEVICE — KIT HAND CONTROL ANGIOTOUCH ACIST 65CM AT-P65

## (undated) DEVICE — SHEATH PRELUDE SNAP 7FRX13CM PLS-1007

## (undated) DEVICE — CATH ANGIO JUDKINS R4 6FRX100CM INFINITI 534621T

## (undated) DEVICE — INTRO SHEATH 7FRX10CM PINNACLE RSS702

## (undated) DEVICE — WIRE GUIDE HI-TRQ  WHISPER MS JTIP 0.014"X190CM 1005357HJ

## (undated) DEVICE — CABLE PACING ALLIGATOR CLIP 12FT 5833SL

## (undated) DEVICE — PACK PCMKR PERM SRG PROC LF SAN32PC573

## (undated) DEVICE — INTRO GLIDESHEATH SLENDER 6FR 10X45CM 60-1060

## (undated) DEVICE — GUIDEWIRE VASC 182CM .014IN CHC PT I H7491216101J2

## (undated) RX ORDER — HEPARIN SODIUM 1000 [USP'U]/ML
INJECTION, SOLUTION INTRAVENOUS; SUBCUTANEOUS
Status: DISPENSED
Start: 2024-11-20

## (undated) RX ORDER — BUPIVACAINE HYDROCHLORIDE 2.5 MG/ML
INJECTION, SOLUTION EPIDURAL; INFILTRATION; INTRACAUDAL; PERINEURAL
Status: DISPENSED
Start: 2025-02-28

## (undated) RX ORDER — LIDOCAINE 40 MG/G
CREAM TOPICAL
Status: DISPENSED
Start: 2025-05-20

## (undated) RX ORDER — NITROGLYCERIN 5 MG/ML
VIAL (ML) INTRAVENOUS
Status: DISPENSED
Start: 2024-11-20

## (undated) RX ORDER — LIDOCAINE HYDROCHLORIDE 10 MG/ML
INJECTION, SOLUTION EPIDURAL; INFILTRATION; INTRACAUDAL; PERINEURAL
Status: DISPENSED
Start: 2024-11-20

## (undated) RX ORDER — FENTANYL CITRATE 50 UG/ML
INJECTION, SOLUTION INTRAMUSCULAR; INTRAVENOUS
Status: DISPENSED
Start: 2025-02-28

## (undated) RX ORDER — FENTANYL CITRATE 50 UG/ML
INJECTION, SOLUTION INTRAMUSCULAR; INTRAVENOUS
Status: DISPENSED
Start: 2024-11-20

## (undated) RX ORDER — CEFAZOLIN SODIUM 2 G/50ML
SOLUTION INTRAVENOUS
Status: DISPENSED
Start: 2025-02-28

## (undated) RX ORDER — VERAPAMIL HYDROCHLORIDE 2.5 MG/ML
INJECTION, SOLUTION INTRAVENOUS
Status: DISPENSED
Start: 2024-11-20

## (undated) RX ORDER — LIDOCAINE HYDROCHLORIDE 10 MG/ML
INJECTION, SOLUTION EPIDURAL; INFILTRATION; INTRACAUDAL; PERINEURAL
Status: DISPENSED
Start: 2025-02-28

## (undated) RX ORDER — REGADENOSON 0.08 MG/ML
INJECTION, SOLUTION INTRAVENOUS
Status: DISPENSED
Start: 2020-03-16